# Patient Record
Sex: MALE | Race: BLACK OR AFRICAN AMERICAN | NOT HISPANIC OR LATINO | URBAN - METROPOLITAN AREA
[De-identification: names, ages, dates, MRNs, and addresses within clinical notes are randomized per-mention and may not be internally consistent; named-entity substitution may affect disease eponyms.]

---

## 2023-04-21 ENCOUNTER — TELEPHONE (OUTPATIENT)
Dept: SPORTS MEDICINE | Facility: CLINIC | Age: 23
End: 2023-04-21
Payer: COMMERCIAL

## 2023-04-21 ENCOUNTER — HOSPITAL ENCOUNTER (OUTPATIENT)
Dept: RADIOLOGY | Facility: HOSPITAL | Age: 23
Discharge: HOME | End: 2023-04-21
Attending: PHYSICIAN ASSISTANT
Payer: COMMERCIAL

## 2023-04-21 ENCOUNTER — HOSPITAL ENCOUNTER (OUTPATIENT)
Dept: RADIOLOGY | Facility: OTHER | Age: 23
Discharge: HOME | End: 2023-04-21
Attending: PHYSICIAN ASSISTANT
Payer: COMMERCIAL

## 2023-04-21 ENCOUNTER — OFFICE VISIT (OUTPATIENT)
Dept: SPORTS MEDICINE | Facility: CLINIC | Age: 23
End: 2023-04-21
Payer: COMMERCIAL

## 2023-04-21 VITALS
WEIGHT: 221 LBS | HEART RATE: 49 BPM | BODY MASS INDEX: 27.48 KG/M2 | HEIGHT: 75 IN | DIASTOLIC BLOOD PRESSURE: 73 MMHG | SYSTOLIC BLOOD PRESSURE: 133 MMHG

## 2023-04-21 DIAGNOSIS — M79.671 PAIN OF MIDFOOT, RIGHT: ICD-10-CM

## 2023-04-21 DIAGNOSIS — M79.671 RIGHT FOOT PAIN: ICD-10-CM

## 2023-04-21 DIAGNOSIS — M84.361A STRESS FRACTURE OF RIGHT TIBIA, INITIAL ENCOUNTER: ICD-10-CM

## 2023-04-21 DIAGNOSIS — M79.661 PAIN IN RIGHT SHIN: ICD-10-CM

## 2023-04-21 DIAGNOSIS — M79.661 PAIN IN RIGHT SHIN: Primary | ICD-10-CM

## 2023-04-21 PROCEDURE — 73630 X-RAY EXAM OF FOOT: CPT | Mod: TC,RT

## 2023-04-21 PROCEDURE — 73718 MRI LOWER EXTREMITY W/O DYE: CPT | Mod: TC,RT

## 2023-04-21 PROCEDURE — 73590 X-RAY EXAM OF LOWER LEG: CPT | Mod: 26,RT,, | Performed by: RADIOLOGY

## 2023-04-21 PROCEDURE — 99205 PR OFFICE/OUTPT VISIT, NEW, LEVL V, 60-74 MIN: ICD-10-PCS | Mod: S$GLB,,, | Performed by: PHYSICIAN ASSISTANT

## 2023-04-21 PROCEDURE — 73630 XR FOOT COMPLETE 3 VIEW RIGHT: ICD-10-PCS | Mod: 26,RT,, | Performed by: RADIOLOGY

## 2023-04-21 PROCEDURE — 73718 MRI TIBIA FIBULA WITHOUT CONTRAST RIGHT: ICD-10-PCS | Mod: 26,RT,, | Performed by: RADIOLOGY

## 2023-04-21 PROCEDURE — 73630 X-RAY EXAM OF FOOT: CPT | Mod: 26,RT,, | Performed by: RADIOLOGY

## 2023-04-21 PROCEDURE — 99999 PR PBB SHADOW E&M-NEW PATIENT-LVL III: ICD-10-PCS | Mod: PBBFAC,,, | Performed by: PHYSICIAN ASSISTANT

## 2023-04-21 PROCEDURE — 99999 PR PBB SHADOW E&M-NEW PATIENT-LVL III: CPT | Mod: PBBFAC,,, | Performed by: PHYSICIAN ASSISTANT

## 2023-04-21 PROCEDURE — 1160F RVW MEDS BY RX/DR IN RCRD: CPT | Mod: CPTII,S$GLB,, | Performed by: PHYSICIAN ASSISTANT

## 2023-04-21 PROCEDURE — 1159F PR MEDICATION LIST DOCUMENTED IN MEDICAL RECORD: ICD-10-PCS | Mod: CPTII,S$GLB,, | Performed by: PHYSICIAN ASSISTANT

## 2023-04-21 PROCEDURE — 3075F PR MOST RECENT SYSTOLIC BLOOD PRESS GE 130-139MM HG: ICD-10-PCS | Mod: CPTII,S$GLB,, | Performed by: PHYSICIAN ASSISTANT

## 2023-04-21 PROCEDURE — 73590 XR TIBIA FIBULA 2 VIEW RIGHT: ICD-10-PCS | Mod: 26,RT,, | Performed by: RADIOLOGY

## 2023-04-21 PROCEDURE — 3008F BODY MASS INDEX DOCD: CPT | Mod: CPTII,S$GLB,, | Performed by: PHYSICIAN ASSISTANT

## 2023-04-21 PROCEDURE — 99205 OFFICE O/P NEW HI 60 MIN: CPT | Mod: S$GLB,,, | Performed by: PHYSICIAN ASSISTANT

## 2023-04-21 PROCEDURE — 1159F MED LIST DOCD IN RCRD: CPT | Mod: CPTII,S$GLB,, | Performed by: PHYSICIAN ASSISTANT

## 2023-04-21 PROCEDURE — 1160F PR REVIEW ALL MEDS BY PRESCRIBER/CLIN PHARMACIST DOCUMENTED: ICD-10-PCS | Mod: CPTII,S$GLB,, | Performed by: PHYSICIAN ASSISTANT

## 2023-04-21 PROCEDURE — 3075F SYST BP GE 130 - 139MM HG: CPT | Mod: CPTII,S$GLB,, | Performed by: PHYSICIAN ASSISTANT

## 2023-04-21 PROCEDURE — 3078F PR MOST RECENT DIASTOLIC BLOOD PRESSURE < 80 MM HG: ICD-10-PCS | Mod: CPTII,S$GLB,, | Performed by: PHYSICIAN ASSISTANT

## 2023-04-21 PROCEDURE — 73718 MRI LOWER EXTREMITY W/O DYE: CPT | Mod: 26,RT,, | Performed by: RADIOLOGY

## 2023-04-21 PROCEDURE — 3008F PR BODY MASS INDEX (BMI) DOCUMENTED: ICD-10-PCS | Mod: CPTII,S$GLB,, | Performed by: PHYSICIAN ASSISTANT

## 2023-04-21 PROCEDURE — 73590 X-RAY EXAM OF LOWER LEG: CPT | Mod: TC,RT

## 2023-04-21 PROCEDURE — 3078F DIAST BP <80 MM HG: CPT | Mod: CPTII,S$GLB,, | Performed by: PHYSICIAN ASSISTANT

## 2023-04-21 NOTE — TELEPHONE ENCOUNTER
LVM Called patient to get info before appt this afternoon  -how long has he had injury  -is it sport related  -what school does he attend

## 2023-04-21 NOTE — TELEPHONE ENCOUNTER
Called and spoke to patient. He injured his right leg and top of right foot 1 year ago playing basketball. He attends [employer]. Notified patient to arrive 20 mins before appt for xray. Patient confirmed

## 2023-04-21 NOTE — PROGRESS NOTES
"CC: right shin and right foot pain    Josue Campos is a 22 y.o. Male who presents today for initial evaluation of right shin and right foot pain.  He is a  at [place] [place] [employer] [place], but has plans to transfer to a different school at the end of this academic semester. The [employer] [facility] [employer] Athletics training staff is unaware of the injury per patient.  Patient reports that he has been experiencing pain in his right shin for over a year now.  He reports a history of shin splints that have typically affected the medial aspect of his shin, however the pain that he has been experiencing recently seems quite different.  He localizes the pain to the right anterior shin and points to a noticeable area of swelling/tenderness about midway down his shin.  He reports that this is exquisitely tender to touch and has significant pain with running and jumping although he has continued to play through this injury.  He denies any direct trauma to the area that he can recall.  Regarding the foot, he has had on and off pain for the past 2-3 years.  He localizes the pain to the medial midfoot and is worse with running and jumping as well as standing on his toes.  He has not noticed any significant swelling in the area.  This also seems to have been slowing him down from an activity standpoint.  No prior injuries or surgeries on his right foot, ankle, or leg.        Is affecting ADLs.       PAST MEDICAL HISTORY: History reviewed. No pertinent past medical history.  PAST SURGICAL HISTORY: History reviewed. No pertinent surgical history.  FAMILY HISTORY: History reviewed. No pertinent family history.  SOCIAL HISTORY:   Social History     Socioeconomic History    Marital status: Single       MEDICATIONS: No current outpatient medications on file.  ALLERGIES: Review of patient's allergies indicates:  No Known Allergies    VITAL SIGNS: /73   Pulse (!) 49   Ht 6' 3" (1.905 m)   Wt 100.2 kg (221 lb)   BMI 27.62 kg/m²      Review " of Systems   Constitution: Negative. Negative for chills, fever and night sweats.   HENT: Negative for congestion and headaches.    Eyes: Negative for blurred vision, left vision loss and right vision loss.   Cardiovascular: Negative for chest pain and syncope.   Respiratory: Negative for cough and shortness of breath.    Endocrine: Negative for polydipsia, polyphagia and polyuria.   Hematologic/Lymphatic: Negative for bleeding problem. Does not bruise/bleed easily.   Skin: Negative for dry skin, itching and rash.   Musculoskeletal: Negative for falls and muscle weakness. Positive for right shin pain  Gastrointestinal: Negative for abdominal pain and bowel incontinence.   Genitourinary: Negative for bladder incontinence and nocturia.   Neurological: Negative for disturbances in coordination, loss of balance and seizures.   Psychiatric/Behavioral: Negative for depression. The patient does not have insomnia.    Allergic/Immunologic: Negative for hives and persistent infections.       PHYSICAL EXAMINATION    General:  The patient is alert and oriented x 3.  Mood is pleasant.  Observation of ears, eyes and nose reveal no gross abnormalities.  No labored breathing observed.    Right Foot and Ankle, Lower extremity exam    INSPECTION:      ALIGNMENT:  Gait:    Antalgic   Hindfoot  Normal    Scars:   None    Midfoot: Normal  Swelling:   mild     Forefoot: Normal  Color:   Normal      Atrophy:  None    Collective Ankle-Hindfoot Alignment    Heel / Toe Walking: + pain    Good -plantigrade (PG), well aligned             KNEE:  TENDERNESS / CREPITUS (T / C):          T / C      T / C   Patella   - / -   Lateral joint line   - / -   Peripatellar medial  -  Medial joint line    - / -   Peripatellar lateral -  Medial plica   - / -   Patellar tendon -   Popliteal fossa   - / -   Quad tendon   -   Gastrocnemius   -   Prepatellar Bursa - / -   Quadricep   -   Tibial tubercle  -  Thigh/hamstring  -   Pes  anserine/HS -  Fibula    -   ITB   - / -  Tibia     + (midshaft tibia)   Tib/fib joint  - / -  LCL    -     MFC   - / -   MCL: Proximal  -    LFC   - / -    Distal   -          ROM: (* = pain)  PASSIVE   ACTIVE    Left :   5 / 0 / 145   5 / 0 / 145     Right :    5 / 0 / 145   5 / 0 / 145       TENDERNESS:  lATERAL:    anterior:  Sinus tarsi:  None  Anteromedial joint line:  none  Syndesmosis:  none  Anterolateral joint line:  none  ATFL:   none  Talonavicular:    none   CFL:   none  Anterior tibialis:   none  Anterolateral gutter: none  Extensor tendons:   none  Fibula:   none  Peroneal tendons: none  POSTERIOR:  Peroneal tubercle.  None  Medial/lateral achilles:   none       Medial/lateral achilles insertion: none  MEDIAL:      Deltoid:  none  CALCANEUS:  Malleolus:  none  Retrocalcaneal:   none  PTT:   none  Medial achilles:   none  Navicular:  +  Lateral achilles:   none       Calcaneal tuberosity:   none  FOOT:    Calcaneal cuboid  none MT / MT heads:  +  Navicular   +  Medial cord origin PF:  none  Cuneiforms:   + Web space:   none  Lisfranc    none  Tarsal tunnel:   none  Base of the fifth metatarsal  none Tinels sign   Neg    KNEE: No tenderness to palpation regarding the right knee.  He exhibits full active and passive range of motion of the right knee.  Ligamentously stable.  Negative Marie's.        RANGE OF MOTION:  RIGHT/ LEFT   STRENGTH: (affected)  Ankle DF/PF:  15*/45* 15/45    Anterior tibialis: 4/5*     Eversion/Inversion: 15/25 15/25  Posterior tibialis: 5/5*   Midfoot ABD/ADD: 10/10 10/10  Gastroc-soleus: 5/5   First MTP DF/PF: 60/25 60/25  Peroneals:  5/5    (* = pain)    EHL:   5/5         FHL:   5/5      SPECIAL TESTS:   ANKLE INSTABILITY: (*pain)    Anterior drawer:   Normal      (C-W contralateral side)     Inversion:   30°     Eversion  10°            Collective Instability: (Ant-post and varus-valgus)     Stable        PROVOCATIVE TESTING:    Forced DF/ER: No pain at  syndesmosis.    Mid-leg squeeze  No pain at syndesmosis, + Pain at mid tibia    Forced DF:  No pain anterior joint line.      Forced PF:  No pain posterior ankle.     Forced INV:  No pain present laterally    Forced EV:  No pain medial     Valdezs sign: Normal ankle plantar flexion.     Resisted peroneal No subluxation or pain    1st-2nd MT toggle + pain at navicular/medial cuneiform    MT-T torque  + pain at navicular/medial cuneiform     NEUROLOGIC TESTING:  All dermatomes foot, ankle and leg have normal sensation light touch  Ankle Reflexes 2+, symmetric   Negative Babinski and No Clonus    VASCULAR:  2+ pulses PT/DT with brisk capillary refill toes.    Other Findings:  N/A    X-rays right tibia/fibula (4/21/2023):  There is a stress fracture present with cortical involvement along the anterior cortex with associated periosteal reaction/thickening.  The ankle mortise appears intact with no apparent widening.  Visualized portions of the knee also appear normal without any acute fractures.    X-rays right foot (4/21/2023):  No acute fracture or dislocation seen.  Satisfactory alignment.  No radiopaque foreign body or significant soft tissue edema seen.    ASSESSMENT:   Right lower leg/shin pain  Tibial stress fracture, right  Right foot pain, possible midfoot stress fracture    PLAN:    I made the decision to obtain old records of the patient including previous notes and imaging. New imaging was ordered today of the extremity or extremities evaluated. I independently reviewed and interpreted the radiographs and/or MRIs today as well as prior imaging, if available.    We discussed at length different conservative management options. These include anti-inflammatories, acetaminophen, rest, ice, heat, formal physical therapy including strengthening and stretching exercises, home exercise programs, dry needling, etc.     Orders placed for stat MRI of the right tibia to further evaluate tibial stress fracture.  I have  also placed orders for an MRI of the right mid foot to evaluate for possible midfoot stress fracture.      [id number] Steffany Valdez performed a custom orthotic / brace adjustment, fitting and training with the patient. The patient demonstrated understanding and proper care. This was performed for 10 minutes.  Fitted for a tall cam walking boot and provided with crutches.  Patient was instructed to be strictly nonweightbearing at this time.    Patient is not cleared for basketball and contact at this time.    Continue to rest, ice, and elevate the right leg and right foot and take over-the-counter anti-inflammatories/acetaminophen as needed for pain.    Follow-up with MRI results    All questions were answered. Instructed patient to call with questions or concerns in the interim.       Medical Dictation software was used during the dictation of portions or the entirety of this medical record.  Phonetic or grammatic errors may exist due to the use of this software. For clarification, refer to the author of the document.

## 2023-04-22 ENCOUNTER — HOSPITAL ENCOUNTER (OUTPATIENT)
Dept: RADIOLOGY | Facility: HOSPITAL | Age: 23
Discharge: HOME | End: 2023-04-22
Attending: PHYSICIAN ASSISTANT
Payer: COMMERCIAL

## 2023-04-22 DIAGNOSIS — M79.661 PAIN IN RIGHT SHIN: ICD-10-CM

## 2023-04-22 DIAGNOSIS — M79.671 RIGHT FOOT PAIN: ICD-10-CM

## 2023-04-22 DIAGNOSIS — M79.671 PAIN OF MIDFOOT, RIGHT: ICD-10-CM

## 2023-04-22 PROCEDURE — 73718 MRI LOWER EXTREMITY W/O DYE: CPT | Mod: TC,RT

## 2023-04-22 PROCEDURE — 73718 MRI FOOT (MIDFOOT) RIGHT WITHOUT CONTRAST: ICD-10-PCS | Mod: 26,RT,, | Performed by: RADIOLOGY

## 2023-04-22 PROCEDURE — 73718 MRI LOWER EXTREMITY W/O DYE: CPT | Mod: 26,RT,, | Performed by: RADIOLOGY

## 2023-04-24 ENCOUNTER — HOSPITAL ENCOUNTER (OUTPATIENT)
Dept: RADIOLOGY | Facility: HOSPITAL | Age: 23
Discharge: HOME | End: 2023-04-24
Attending: PHYSICIAN ASSISTANT
Payer: COMMERCIAL

## 2023-04-24 DIAGNOSIS — M84.361A STRESS FRACTURE OF RIGHT TIBIA, INITIAL ENCOUNTER: Primary | ICD-10-CM

## 2023-04-24 DIAGNOSIS — M84.361A STRESS FRACTURE OF RIGHT TIBIA, INITIAL ENCOUNTER: ICD-10-CM

## 2023-04-24 PROCEDURE — 73700 CT LOWER EXTREMITY W/O DYE: CPT | Mod: 26,RT,, | Performed by: RADIOLOGY

## 2023-04-24 PROCEDURE — 73700 CT LEG (TIBIA-FIBULA) WITHOUT CONTRAST RIGHT: ICD-10-PCS | Mod: 26,RT,, | Performed by: RADIOLOGY

## 2023-04-24 PROCEDURE — 73700 CT LOWER EXTREMITY W/O DYE: CPT | Mod: TC,RT

## 2023-04-25 ENCOUNTER — OFFICE VISIT (OUTPATIENT)
Dept: SPORTS MEDICINE | Facility: CLINIC | Age: 23
End: 2023-04-25
Payer: COMMERCIAL

## 2023-04-25 VITALS
SYSTOLIC BLOOD PRESSURE: 143 MMHG | HEIGHT: 75 IN | BODY MASS INDEX: 27.98 KG/M2 | HEART RATE: 62 BPM | WEIGHT: 225 LBS | DIASTOLIC BLOOD PRESSURE: 79 MMHG

## 2023-04-25 DIAGNOSIS — M84.361A STRESS FRACTURE OF RIGHT TIBIA, INITIAL ENCOUNTER: Primary | ICD-10-CM

## 2023-04-25 PROCEDURE — 1159F MED LIST DOCD IN RCRD: CPT | Mod: CPTII,S$GLB,, | Performed by: ORTHOPAEDIC SURGERY

## 2023-04-25 PROCEDURE — 99214 PR OFFICE/OUTPT VISIT, EST, LEVL IV, 30-39 MIN: ICD-10-PCS | Mod: S$GLB,,, | Performed by: ORTHOPAEDIC SURGERY

## 2023-04-25 PROCEDURE — 3008F BODY MASS INDEX DOCD: CPT | Mod: CPTII,S$GLB,, | Performed by: ORTHOPAEDIC SURGERY

## 2023-04-25 PROCEDURE — 99999 PR PBB SHADOW E&M-EST. PATIENT-LVL III: ICD-10-PCS | Mod: PBBFAC,,, | Performed by: ORTHOPAEDIC SURGERY

## 2023-04-25 PROCEDURE — 1159F PR MEDICATION LIST DOCUMENTED IN MEDICAL RECORD: ICD-10-PCS | Mod: CPTII,S$GLB,, | Performed by: ORTHOPAEDIC SURGERY

## 2023-04-25 PROCEDURE — 3008F PR BODY MASS INDEX (BMI) DOCUMENTED: ICD-10-PCS | Mod: CPTII,S$GLB,, | Performed by: ORTHOPAEDIC SURGERY

## 2023-04-25 PROCEDURE — 3078F PR MOST RECENT DIASTOLIC BLOOD PRESSURE < 80 MM HG: ICD-10-PCS | Mod: CPTII,S$GLB,, | Performed by: ORTHOPAEDIC SURGERY

## 2023-04-25 PROCEDURE — 3077F PR MOST RECENT SYSTOLIC BLOOD PRESSURE >= 140 MM HG: ICD-10-PCS | Mod: CPTII,S$GLB,, | Performed by: ORTHOPAEDIC SURGERY

## 2023-04-25 PROCEDURE — 99214 OFFICE O/P EST MOD 30 MIN: CPT | Mod: S$GLB,,, | Performed by: ORTHOPAEDIC SURGERY

## 2023-04-25 PROCEDURE — 3077F SYST BP >= 140 MM HG: CPT | Mod: CPTII,S$GLB,, | Performed by: ORTHOPAEDIC SURGERY

## 2023-04-25 PROCEDURE — 99999 PR PBB SHADOW E&M-EST. PATIENT-LVL III: CPT | Mod: PBBFAC,,, | Performed by: ORTHOPAEDIC SURGERY

## 2023-04-25 PROCEDURE — 3078F DIAST BP <80 MM HG: CPT | Mod: CPTII,S$GLB,, | Performed by: ORTHOPAEDIC SURGERY

## 2023-04-25 NOTE — PROGRESS NOTES
CC: Right shin and right foot pain     Josue is a 23 yo M college  who is c/o right anterior tibial pain. 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  He tells me that he is in the process of finishing up his classes at [employer] and graduating and has one year of college eligibility left.  He tells me he is looking at other schools and is not going to play for [employer] next year.      He states that he had some intermittent pain over his shin while playing basketball during the season but states that he was able to play with it.  He states that he was playing basketball last week and noticed that the anterior shin pain had gotten worse, though he was still able to play.  He also noted that he had foot pain on the same side. He came in to be evaluated last week and he was found to have an anterior tibial stress fracture on imaging at the end of last week on initial presentation to our clinic.  He was placed in a boot, held from basketball work-outs and returns to clinic following further imaging with his parents to discuss his diagnosis and his care options.      He has been nonweightbearing in a CAM walker boot with crutches since his visit with Omer Segovia 4 days ago.      Hx (4/21/23 - Omer Segovia PA-C):  Josue Campos is a 22 y.o. Male who presents today for initial evaluation of right shin and right foot pain.  He is a  at [employer], but has plans to transfer to a different school at the end of this academic semester. The [employer] training staff is unaware of the injury per patient.  Patient reports that he has been experiencing pain in his right shin for over a year now.  He reports a history of shin splints that have typically affected the medial aspect of his shin, however the pain that he has been experiencing recently seems quite  "different.  He localizes the pain to the right anterior shin and points to a noticeable area of swelling/tenderness about midway down his shin.  He reports that this is exquisitely tender to touch and has significant pain with running and jumping although he has continued to play through this injury.  He denies any direct trauma to the area that he can recall.  Regarding the foot, he has had on and off pain for the past 2-3 years.  He localizes the pain to the medial midfoot and is worse with running and jumping as well as standing on his toes.  He has not noticed any significant swelling in the area.  This also seems to have been slowing him down from an activity standpoint.  No prior injuries or surgeries on his right foot, ankle, or leg.        Is affecting ADLs.       PAST MEDICAL HISTORY: No past medical history on file.  PAST SURGICAL HISTORY: No past surgical history on file.  FAMILY HISTORY: No family history on file.  SOCIAL HISTORY:   Social History     Socioeconomic History    Marital status: Single       MEDICATIONS: No current outpatient medications on file.  ALLERGIES: Review of patient's allergies indicates:  No Known Allergies    VITAL SIGNS: BP (!) 143/79   Pulse 62   Ht 6' 3" (1.905 m)   Wt 102.1 kg (225 lb)   BMI 28.12 kg/m²      Review of Systems   Constitution: Negative. Negative for chills, fever and night sweats.   HENT: Negative for congestion and headaches.    Eyes: Negative for blurred vision, left vision loss and right vision loss.   Cardiovascular: Negative for chest pain and syncope.   Respiratory: Negative for cough and shortness of breath.    Endocrine: Negative for polydipsia, polyphagia and polyuria.   Hematologic/Lymphatic: Negative for bleeding problem. Does not bruise/bleed easily.   Skin: Negative for dry skin, itching and rash.   Musculoskeletal: Negative for falls and muscle weakness. Positive for right shin pain  Gastrointestinal: Negative for abdominal pain and bowel " incontinence.   Genitourinary: Negative for bladder incontinence and nocturia.   Neurological: Negative for disturbances in coordination, loss of balance and seizures.   Psychiatric/Behavioral: Negative for depression. The patient does not have insomnia.    Allergic/Immunologic: Negative for hives and persistent infections.       PHYSICAL EXAMINATION    General:  The patient is alert and oriented x 3.  Mood is pleasant.  Observation of ears, eyes and nose reveal no gross abnormalities.  No labored breathing observed.    Right Foot and Ankle, Lower extremity exam    INSPECTION:      ALIGNMENT:  Gait:    Antalgic   Hindfoot  Normal    Scars:   None    Midfoot: Normal  Swelling:   mild     Forefoot: Normal  Color:   Normal      Atrophy:  None    Collective Ankle-Hindfoot Alignment    Heel / Toe Walking: + pain    Good -plantigrade (PG), well aligned             KNEE:  TENDERNESS / CREPITUS (T / C):          T / C      T / C   Patella   - / -   Lateral joint line   - / -   Peripatellar medial  -  Medial joint line    - / -   Peripatellar lateral -  Medial plica   - / -   Patellar tendon -   Popliteal fossa   - / -   Quad tendon   -   Gastrocnemius   -   Prepatellar Bursa - / -   Quadricep   -   Tibial tubercle  -  Thigh/hamstring  -   Pes anserine/HS -  Fibula    -   ITB   - / -  Tibia     + (midshaft tibia)   Tib/fib joint  - / -  LCL    -     MFC   - / -   MCL: Proximal  -    LFC   - / -    Distal   -          ROM: (* = pain)  PASSIVE   ACTIVE    Left :   5 / 0 / 145   5 / 0 / 145     Right :    5 / 0 / 145   5 / 0 / 145       TENDERNESS:  lATERAL:    anterior:  Sinus tarsi:  None  Anteromedial joint line:  none  Syndesmosis:  none  Anterolateral joint line:  none  ATFL:   none  Talonavicular:    none   CFL:   none  Anterior tibialis:   none  Anterolateral gutter: none  Extensor tendons:   none  Fibula:   none  Peroneal tendons: none  POSTERIOR:  Peroneal tubercle.  None  Medial/lateral  achilles:   none       Medial/lateral achilles insertion: none  MEDIAL:      Deltoid:  none  CALCANEUS:  Malleolus:  none  Retrocalcaneal:   none  PTT:   none  Medial achilles:   none  Navicular:  +  Lateral achilles:   none       Calcaneal tuberosity:   none  FOOT:    Calcaneal cuboid  none MT / MT heads:  +  Navicular   +  Medial cord origin PF:  none  Cuneiforms:   + Web space:   none  Lisfranc    none  Tarsal tunnel:   none  Base of the fifth metatarsal  none Tinels sign   Neg    KNEE: No tenderness to palpation regarding the right knee.  He exhibits full active and passive range of motion of the right knee.  Ligamentously stable.  Negative Marie's.        RANGE OF MOTION:  RIGHT/ LEFT   STRENGTH: (affected)  Ankle DF/PF:  15*/45* 15/45    Anterior tibialis: 4/5*     Eversion/Inversion: 15/25 15/25  Posterior tibialis: 5/5*   Midfoot ABD/ADD: 10/10 10/10  Gastroc-soleus: 5/5   First MTP DF/PF: 60/25 60/25  Peroneals:  5/5    (* = pain)    EHL:   5/5         FHL:   5/5      SPECIAL TESTS:   ANKLE INSTABILITY: (*pain)    Anterior drawer:   Normal      (C-W contralateral side)     Inversion:   30°     Eversion  10°            Collective Instability: (Ant-post and varus-valgus)     Stable        PROVOCATIVE TESTING:    Forced DF/ER: No pain at syndesmosis.    Mid-leg squeeze  No pain at syndesmosis, + Pain at mid tibia    Forced DF:  No pain anterior joint line.      Forced PF:  No pain posterior ankle.     Forced INV:  No pain present laterally    Forced EV:  No pain medial     Valdezs sign: Normal ankle plantar flexion.     Resisted peroneal No subluxation or pain    1st-2nd MT toggle + pain at navicular/medial cuneiform    MT-T torque  + pain at navicular/medial cuneiform     NEUROLOGIC TESTING:  All dermatomes foot, ankle and leg have normal sensation light touch  Ankle Reflexes 2+, symmetric   Negative Babinski and No Clonus    VASCULAR:  2+ pulses PT/DT with brisk capillary refill toes.    Other  Findings:  N/A    X-rays right tibia/fibula (4/21/2023):  There is a stress fracture present with cortical involvement along the anterior cortex with associated periosteal reaction/thickening.  The ankle mortise appears intact with no apparent widening.  Visualized portions of the knee also appear normal without any acute fractures.    X-rays right foot (4/21/2023):  No acute fracture or dislocation seen.  Satisfactory alignment.  No radiopaque foreign body or significant soft tissue edema seen.      CT Right tibia/fibula:   Narrative & Impression  EXAMINATION:  CT LEG (TIBIA-FIBULA) WITHOUT CONTRAST RIGHT     CLINICAL HISTORY:  Fracture, tib/fib;  Stress fracture, right tibia, initial encounter for fracture     TECHNIQUE:  CT right tibia/fibula performed without contrast.  Coronal and sagittal reformats provided.     COMPARISON:  MRI 04/21/2023     FINDINGS:  There is focal transverse lucency within the anterior tibial cortex corresponding to signal abnormality on prior MRI and compatible with stress fracture.  No additional fracture.  No lytic or blastic lesion.     Soft tissues are unremarkable.  Achilles tendon is intact.  No knee or ankle effusion.     Impression:     Anterior tibial stress fracture.    Mri right/tibia -   EXAMINATION:  MRI TIBIA FIBULA WITHOUT CONTRAST RIGHT     CLINICAL HISTORY:  Fracture, tib/fib;tibial stress fracture with cortical disruption seen on x-ray;  Stress fracture, right tibia, initial encounter for fracture     TECHNIQUE:  Routine MRI evaluation of the right tibia-fibula performed without contrast.     COMPARISON:  Radiograph 04/21/2023.     FINDINGS:  BONES: Cortical thickening at the anterior mid tibia with an intracortical area of edema and surrounding periosteal edema.  No medullary marrow signal abnormality.  No avascular necrosis.  No marrow infiltrative process.     MUSCLES: Normal bulk and signal intensity.     MISCELLANEOUS: Visualized neurovascular structures appear  normal.     Impression:     Anterior tibial cortex stress fracture with associated intracortical and periosteal edema.  No medullary signal abnormality.    ASSESSMENT:   Right lower leg/shin pain  Anterior tibial stress fracture, right  Right foot pain, possible midfoot stress fracture    PLAN:  Treatment options were discussed with the patient about his anterior tibia.  I reviewed the Xrays/MRI/CT images with his, including the relevant anatomy, and what this means for his knee.  We discussed treatment options with him and his parents, along with a , who was present during the visit.  He mentioned to me that he was interested in finishing up his college year and could be interested in having care set up in [place] for his leg and foot.    We discussed that he had a stress fracture in the anterior cortex of his tibia, which was present on all imaging (xrays, CT and MRI).  We discussed that while he could continue to play with that that there were risks of him continuing to play.  The biggest risks that we discussed included worsening of the stress fracture, completion of the fracture of his tibia or re-aggrevation of his stress fracture later when he is in season.  We discussed that getting these to heal with conservative treatment can be challenging.  We discussed that the most reliable way to treat these stress fractures involves surgery with a reamed intramedullary nail in his tibia to protect the bone.  He and his family seem to understand this.      Josue would like to finish up his semester which would seem most reasonable.  I recommended keeping him out of basketball for now and will start him on calcium, vitamin D and discussed pros and cons of a bone stimulator and use of forteo.  They would like to discuss this with an orthopaedic surgeon in the [place] area as that is where they are from, and I provided them with the contact information for [place]. Kenneth Ragland at [facility] [place]  I offered to  reach out to Dr. Ragland's office to make the connection.    We will see Josue back as needed if he needs anything else while he is in [place] of if he decided to continue his care here.      Dirk Hunt MD         All questions were answered. Instructed patient to call with questions or concerns in the interim.

## 2023-08-22 ENCOUNTER — ATHLETIC TRAINING SESSION (OUTPATIENT)
Dept: SPORTS MEDICINE | Facility: CLINIC | Age: 23
End: 2023-08-22
Payer: COMMERCIAL

## 2023-08-22 DIAGNOSIS — M79.10 MUSCLE SORENESS: Primary | ICD-10-CM

## 2023-08-22 NOTE — PROGRESS NOTES
Subjective:       Chief Complaint: Josue Campos is a 23 y.o. male student at [employer] who had concerns including Shoulder Pain (Bilateral) and Elbow Pain (Bilateral).    Pt texted me at 4am in the morning on 4/22/2023 stating he was suhail full sweat and unable to lift his arms pt stated he was unable to lift his arms and cant lift them past 90 degrees. Pt stated he tried ice, heat, and advil stating nothing helped.Pt stated no numbness of tingling is present. PMH: pt stated he has not had any injuries to his shoulder or elbows    Handedness: right-handed  Sport played: basketball      Level: college            Shoulder Pain   The pain is present in the right shoulder and left shoulder. This is a new problem. The current episode started today. There has been no history of extremity trauma. The injury was the result of a lifting (lifting weights) action while weight lifting. The problem occurs constantly. The quality of the pain is described as aching. The pain is at a severity of 7/10. He has tried NSAIDs, cold and heat for the symptoms. The treatment provided no relief.   Elbow Pain   The pain is present in the left elbow and right elbow. This is a new problem. The current episode started yesterday. The injury was the result of a lifting action while weight lifting. The quality of the pain is described as aching. The pain is at a severity of 7/10. He has tried NSAIDs, cold and heat for the symptoms. The treatment provided no relief.       ROS      Objective:       General: Josue is well-developed, well-nourished, appears stated age, in no acute distress, alert and oriented to time, place and person.                 Right Hand/Wrist Exam     Range of Motion   The patient has normal right hand/wrist ROM.      Left Hand/Wrist Exam     Range of Motion   The patient has normal left hand/wrist ROM.      Right Elbow Exam     Inspection   Bruising: absent  Deformity: absent    Range of Motion   Extension:  normal   Flexion:   abnormal Right elbow flexion: Decreased due to pain.    Comments:  Tenderness on triceps      Left Elbow Exam     Inspection   Bruising: absent  Deformity: absent    Range of Motion   Extension:  normal   Left elbow flexion: Decreased due to pain.     Comments:  Tenderness on triceps      Right Shoulder Exam     Inspection/Observation   Swelling: absent  Bruising: absent  Deformity: absent    Range of Motion   Extension:  abnormal Right shoulder extension: decreased due to pain.  Forward Flexion:  abnormal Right shoulder forward flexion: decreased due to pain.    Left Shoulder Exam     Inspection/Observation   Swelling: absent  Bruising: absent  Deformity: absent    Range of Motion   Extension:  abnormal Left shoulder extension: decreased due to pain.  Forward Flexion:  abnormal Left shoulder active forward flexion: decreased due to pain.       Muscle Strength   Right Upper Extremity   Biceps: 4/5 (Due to pain)   Triceps:  4/5 (Due to pain)  Elbow Extension: 4/5  Elbow Flexion: 3/5  Left Upper Extremity  Biceps: 4/5 (Due to pain)   Triceps:  4/5 (Due to pain)  Elbow Extension: 4/5  Elbow Flexion: 3/5    Vascular Exam     Right Pulses      Right radial pulse: normal.      Left Pulses      Left radial pulse: normal.      Capillary Refill  Right Hand: normal capillary refill  Left Hand: normal capillary refill                Assessment:     Status: AT - Cleared to Exert    Date Out: N/A    Date Cleared: N/A      Plan:       1. Pt will start stretching program and be treated with modalities to decrease soreness  2. Physician Referral: no  3. ED Referral: no  4. Parent/Guardian Notified: No  5. All questions were answered, ath. will contact me for questions or concerns in  the interim.  6.         Eligible to use School Insurance: Yes

## 2023-08-23 ENCOUNTER — ATHLETIC TRAINING SESSION (OUTPATIENT)
Dept: SPORTS MEDICINE | Facility: CLINIC | Age: 23
End: 2023-08-23
Payer: COMMERCIAL

## 2023-08-23 DIAGNOSIS — M79.10 MUSCLE SORENESS: Primary | ICD-10-CM

## 2023-08-23 NOTE — PROGRESS NOTES
Subjective:       Chief Complaint: Josue Campos is a 23 y.o. male student at [employer] who had concerns including shoulder pain/ elbow pain  (Soreness).    Handedness: right-handed  Sport played: basketball      Level: college                  Objective:       General: Josue is well-developed, well-nourished, appears stated age, in no acute distress, alert and oriented to time, place and person.           Assessment:     Status: L - Limited    Date Out: n/a    Date Cleared: TBD      Plan:       [facility]       Athletic Training Phone/Text Conversation     Name: Josue Campos  Clinic Number: [id number]  Sport: Men's Basketball  8/24/2023    Notes     Student-athlete contacted ATC via TEXT    Summary of conversation:  8/23/2023  Pt stated he was concerned of rhabdomyolysis and is not doing better and would like to see a Doctor.     Pt was scheduled to see Dr. Jaxson Gtz on 8/24/2023      YO Bailey, ATC    [facility] [employer] [facility]     ---------------------------------------------------------------------------------------  ---------------------------------------------------------------------------------------    [facility]  Date:8/23/2023  Initial encounter in epic  Subjective: Pt stated he is doing a little better and lifting weights 50-70 percent was beneficial.     Objective: no ecchymosis, deformity, swelling.     Plan: Pt instructed to to 50-70 percent of his max during weights with the team.      Josue completed therapeutic exercises to develop flexibility       Exercise Reps/Sets/Time Weight #   Elbow flexion 3x30s    Elbow extension 3x30s    Wall walks 1x10    Wall slides 1x10    Shoulder flexion slides 1x10    Triceps stretch [id number]    Small roller (roll out triceps) 1min                                       Josue received the following  modalities and/or manual therapy techniques:      Modality/Manual Therapy Time   IASTM triceps 3mins                                SHAZIA iVveros, LAT, HENOK    [facility]     [email or web address]   [email or web address]          ---------------------------------------------------------------------------------------  ---------------------------------------------------------------------------------------    [facility]  Date:8/22/2023  Initial encounter in epic  Subjective: Pt stated he is very sore unable to fully flex his shoulder or elbows.    Objective: no ecchymosis, deformity, swelling.     Plan: Pt instructed to to 50-70 percent of his max during weights with the team.      Josue completed therapeutic exercises to develop flexibility       Exercise Reps/Sets/Time Weight #   Elbow flexion 3x30s    Elbow extension 3x30s    Wall walks 1x10    Wall slides 1x10    Shoulder flexion slides 1x10    Triceps stretch [id number]    Small roller (roll out triceps) 1min                                      Josue received the following  modalities and/or manual therapy techniques:      Modality/Manual Therapy Time   Cold/hot whirlpool  10 mins in each   Arm normatec 15mins                            SHAZIA Viveros, LAT, HENOK    [facility]     [email or web address]   [email or web address]    ---------------------------------------------------------------------------------------  ---------------------------------------------------------------------------------------

## 2023-08-24 ENCOUNTER — OFFICE VISIT (OUTPATIENT)
Dept: SPORTS MEDICINE | Facility: CLINIC | Age: 23
End: 2023-08-24
Payer: COMMERCIAL

## 2023-08-24 ENCOUNTER — HOSPITAL ENCOUNTER (OUTPATIENT)
Dept: RADIOLOGY | Facility: HOSPITAL | Age: 23
Discharge: HOME | End: 2023-08-24
Attending: ORTHOPAEDIC SURGERY
Payer: COMMERCIAL

## 2023-08-24 VITALS
BODY MASS INDEX: 27.98 KG/M2 | SYSTOLIC BLOOD PRESSURE: 120 MMHG | HEIGHT: 75 IN | DIASTOLIC BLOOD PRESSURE: 80 MMHG | WEIGHT: 225 LBS

## 2023-08-24 DIAGNOSIS — M79.89 LEFT UPPER EXTREMITY SWELLING: ICD-10-CM

## 2023-08-24 DIAGNOSIS — M25.522 BILATERAL ELBOW JOINT PAIN: Primary | ICD-10-CM

## 2023-08-24 DIAGNOSIS — R25.2 MUSCLE CRAMPING: Primary | ICD-10-CM

## 2023-08-24 DIAGNOSIS — R25.2 MUSCLE CRAMPING: ICD-10-CM

## 2023-08-24 DIAGNOSIS — M79.18 MYALGIA, UPPER ARM: ICD-10-CM

## 2023-08-24 DIAGNOSIS — M79.601 BILATERAL ARM PAIN: Primary | ICD-10-CM

## 2023-08-24 DIAGNOSIS — M79.602 BILATERAL ARM PAIN: Primary | ICD-10-CM

## 2023-08-24 DIAGNOSIS — M25.521 BILATERAL ELBOW JOINT PAIN: Primary | ICD-10-CM

## 2023-08-24 PROCEDURE — 99999 PR PBB SHADOW E&M-EST. PATIENT-LVL II: CPT | Mod: PBBFAC,,, | Performed by: ORTHOPAEDIC SURGERY

## 2023-08-24 PROCEDURE — 93971 EXTREMITY STUDY: CPT | Mod: 26,RT,, | Performed by: RADIOLOGY

## 2023-08-24 PROCEDURE — 3074F SYST BP LT 130 MM HG: CPT | Mod: CPTII,S$GLB,, | Performed by: ORTHOPAEDIC SURGERY

## 2023-08-24 PROCEDURE — 1159F MED LIST DOCD IN RCRD: CPT | Mod: CPTII,S$GLB,, | Performed by: ORTHOPAEDIC SURGERY

## 2023-08-24 PROCEDURE — 1160F RVW MEDS BY RX/DR IN RCRD: CPT | Mod: CPTII,S$GLB,, | Performed by: ORTHOPAEDIC SURGERY

## 2023-08-24 PROCEDURE — 93971 EXTREMITY STUDY: CPT | Mod: TC,LT

## 2023-08-24 PROCEDURE — 3008F BODY MASS INDEX DOCD: CPT | Mod: CPTII,S$GLB,, | Performed by: ORTHOPAEDIC SURGERY

## 2023-08-24 PROCEDURE — 1159F PR MEDICATION LIST DOCUMENTED IN MEDICAL RECORD: ICD-10-PCS | Mod: CPTII,S$GLB,, | Performed by: ORTHOPAEDIC SURGERY

## 2023-08-24 PROCEDURE — 3074F PR MOST RECENT SYSTOLIC BLOOD PRESSURE < 130 MM HG: ICD-10-PCS | Mod: CPTII,S$GLB,, | Performed by: ORTHOPAEDIC SURGERY

## 2023-08-24 PROCEDURE — 3008F PR BODY MASS INDEX (BMI) DOCUMENTED: ICD-10-PCS | Mod: CPTII,S$GLB,, | Performed by: ORTHOPAEDIC SURGERY

## 2023-08-24 PROCEDURE — 99214 PR OFFICE/OUTPT VISIT, EST, LEVL IV, 30-39 MIN: ICD-10-PCS | Mod: S$GLB,,, | Performed by: ORTHOPAEDIC SURGERY

## 2023-08-24 PROCEDURE — 93971 US UPPER EXTREMITY VEINS RIGHT: ICD-10-PCS | Mod: 26,RT,, | Performed by: RADIOLOGY

## 2023-08-24 PROCEDURE — 93971 EXTREMITY STUDY: CPT | Mod: TC,RT

## 2023-08-24 PROCEDURE — 1160F PR REVIEW ALL MEDS BY PRESCRIBER/CLIN PHARMACIST DOCUMENTED: ICD-10-PCS | Mod: CPTII,S$GLB,, | Performed by: ORTHOPAEDIC SURGERY

## 2023-08-24 PROCEDURE — 93971 US UPPER EXTREMITY VEINS LEFT: ICD-10-PCS | Mod: 26,LT,, | Performed by: RADIOLOGY

## 2023-08-24 PROCEDURE — 99999 PR PBB SHADOW E&M-EST. PATIENT-LVL II: ICD-10-PCS | Mod: PBBFAC,,, | Performed by: ORTHOPAEDIC SURGERY

## 2023-08-24 PROCEDURE — 3079F PR MOST RECENT DIASTOLIC BLOOD PRESSURE 80-89 MM HG: ICD-10-PCS | Mod: CPTII,S$GLB,, | Performed by: ORTHOPAEDIC SURGERY

## 2023-08-24 PROCEDURE — 3079F DIAST BP 80-89 MM HG: CPT | Mod: CPTII,S$GLB,, | Performed by: ORTHOPAEDIC SURGERY

## 2023-08-24 PROCEDURE — 93971 EXTREMITY STUDY: CPT | Mod: 26,LT,, | Performed by: RADIOLOGY

## 2023-08-24 PROCEDURE — 99214 OFFICE O/P EST MOD 30 MIN: CPT | Mod: S$GLB,,, | Performed by: ORTHOPAEDIC SURGERY

## 2023-08-24 NOTE — PROGRESS NOTES
"CC: bilateral elbow/upper arm pain    23 y.o. Male presents today for evaluation of his bilateral elbow/upper arm pain. 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 He states he did a very upper body intensive workout 08/21/2023 and felt more fatigued than normal. He states he tried to shoot after the workout but was unable to due to his arms feeling "dead." He states he had not previously been working out this hard. He states his urine has been clear and is has been adequately hydrating. He denies numbness/tingling to the hands or fingers. He has noted persistent left posterior upper arm swelling that hurts to squeeze and to bend his elbow.  How long: Beginning 08/22/2023  What makes it better: Nothing  What makes it worse: Elbow flexion, palpation of the affected area  Does it radiate: Denies  Attempted treatments: Stretching, rest, Tylenol and Advil as needed  Pain score: 2/10  Any mechanical symptoms: Denies  Feelings of instability: Denies  Problems with ADLs: Admits to this problem affecting his ability to perform ADLs    Occupation: Student athlete - [employer]    PAST MEDICAL HISTORY:   History reviewed. No pertinent past medical history.    PAST SURGICAL HISTORY:   History reviewed. No pertinent surgical history.    FAMILY HISTORY:   History reviewed. No pertinent family history.    SOCIAL HISTORY:   Social History     Socioeconomic History    Marital status: Single       MEDICATIONS:   No current outpatient medications on file.    ALLERGIES:   Review of patient's allergies indicates:  No Known Allergies     PHYSICAL EXAMINATION:  /80   Ht 6' 3" (1.905 m)   Wt 102.1 kg (225 lb)   BMI 28.12 kg/m²   Vitals signs and nursing note have been reviewed.  General: In no acute distress, well developed, well nourished, no diaphoresis  Eyes: EOM full and smooth, no eye " redness or discharge  HENT: normocephalic and atraumatic, neck supple, trachea midline, no nasal discharge, no external ear redness or discharge  Cardiovascular: no LE edema  Lungs: respirations non-labored, no conversational dyspnea   Abd: non-distended, no rigidity  MSK: no amputation or deformity, no swelling of extremities  Neuro: AAOx3, CN2-12 grossly intact  Skin: No rashes, warm and dry  Psychiatric: cooperative, pleasant, mood and affect appropriate for age    Elbow: BILATERAL   The affected elbow is compared to the contralateral elbow.    Observation:    There is no erythema, or ecchymosis. Edema of the left upper arm over the proximal left triceps  There is no obvious muscle atrophy, hypertonicity, or hypotonicity of arm muscles.  There is no abnormal carrying angle or gunstock deformity noted.    ROM:  Active flexion to 70° on left and 80° on right.  Decreased due to pain.  Active extension to 0° on left and 0° on right without hyperextension.   Active pronation to 80° on left and 80° on right.    Active supination to 80° on left and 80° on right.    Active radial deviation to 20° on left and 20° on right.    Active ulnar deviation to 30° on left and 30° on right.    Tenderness To Palpation:  No tenderness at the medial epicondyle or lateral epicondyle.  No tenderness at the olecranon.  No tenderness at the distal humerus or proximal radius/ulna.  No tenderness at the radial head.  No tenderness over the ulnar and radial collateral ligaments.  No tenderness over the posterior interosseous nerve or distal biceps tendon.  + tenderness over the proximal left triceps muscle    Strength Testing:  Deltoid - 5/5 on left and 5/5 on right  Biceps - 5/5 on left and 5/5 on right  Triceps - 5/5 on left and 5/5 on right  Wrist extension - 5/5 on left and 5/5 on right  Wrist flexion - 5/5 on left and 5/5 on right   - 5/5 on left and 5/5 on right  Finger extension - 5/5 on left and 5/5 on right  Finger abduction -  5/5 on left and 5/5 on right    Special Tests:  No laxity of the MCL at 0 and 30 degrees.    Milking maneuver - negative  No laxity of the LCL at 0 and 30 degrees.  No laxity with posterolateral and posteromedial rotary testing.    Resisted supination - negative  Resisted pronation - negative  Resisted wrist extension - negative  Resisted wrist flexion - negative    Neurovascular Exam:  2+ radial pulses BL.  Sensation to light touch intact in the distal median, radial, and ulnar nerve distributions bilaterally.  Negative Tinnels at carpal tunnel.  Negative Tinnels at cubital tunnel.      IMAGIN. X-ray ordered due to bilateral elbow pain   2. X-ray images were reviewed personally by me and then directly with patient.  3. FINDINGS: X-ray images obtained demonstrate no fracture or dislocation, joint spaces maintained  4. IMPRESSION: As above.       ASSESSMENT:      ICD-10-CM ICD-9-CM   1. Bilateral arm pain  M79.601 729.5    M79.602    2. Left upper extremity swelling  M79.89 729.81   3. Muscle cramping  R25.2 729.82   4. Myalgia, upper arm  M79.18 729.1         PLAN:  1-4. 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  Since that time he has had persistent pain, loss of motion especially on the left, and swelling of the left upper arm.  Denies any prior history blood clot or family history of clotting disorder.    - XRs ordered in the office today and images were personally reviewed with the patient. See above for further detail.    - Symptoms, exam, history are concerning for either or both exertional thrombosis, rhabdomyolysis.  Further workup is necessary.    - Due to concern for exertional thrombosis, an ultrasound of the bilateral upper extremity has been ordered and  needs to be obtained quickly.     - BMP, CK, urinalysis ordered for evaluation for possible recovering rhabdomyolysis.    - Contact has been made with his ATC who is on board with the plan.  Hold from sport until labs and testing results return.      Future planning includes - next steps pending labs/imaging    All questions were answered to the best of my ability and all concerns were addressed at this time.    Follow up pending results of labs/imaging.      This note is dictated using the M*Modal Fluency Direct word recognition program. There are word recognition mistakes that are occasionally missed on review.      Total time spent face-to face with patient counseling or coordinating care including prognosis, differential diagnosis, risks and benefits of treatment, instructions, compliance risk reductions as well as non-face-to-face time personally spent reviewing medial record, medical documentation, and coordination of care.     EST MINUTES X   21820 10-19    [id number] 20-29    [id number] 30-39    [id number] 40-54    NEW     14846 15-29    17257 30-44    [id number] 45-59 X   [id number] 60-74    PHONE      5-10    [id number] 11-20    [id number] 21-30

## 2023-08-24 NOTE — PROGRESS NOTES
Subjective:     Josue Campos     No chief complaint on file.    HPI    Josue is a 23 y.o. male coming in today for right shin pain that began *** {DAY/WEEK/MONTH/YEAR:06420} ago, referred by Noemi WHITING. Pt. describes the pain as a {Numbers; 1-10:92285}/10 {Desc; Pain:21469} pain that {does/does not:800918} radiate. There {WAS WAS NOT:27626} a fall/injury/ or trauma associated with the onset of symptoms. The pain is better with *** and worse with ***. Pt. Denies any other musculoskeletal complaints at this time.     Joint instability? no  Mechanical locking/clicking? no  Affecting ADL's? no  Affecting sleep? 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  PAST SURGICAL HISTORY: No past surgical history on file.  FAMILY HISTORY: No family history on file.  SOCIAL HISTORY:   Social History     Socioeconomic History    Marital status: Single     MEDICATIONS: No current outpatient medications on file.  ALLERGIES:   Review of patient's allergies indicates:  No Known Allergies    Reviewed office visit on 23 with Dr. Dirk Hunt : Josue would like to finish up his semester which would seem most reasonable.  I recommended keeping him out of basketball for now and will start him on calcium, vitamin D and discussed pros and cons of a bone stimulator and use of forteo.  They would like to discuss this with an orthopaedic surgeon in the [place] area as that is where they are from, and I [place] them with [place] [facility] [place]  I offered to reach out to Dr. Ragland's office to make the connection.    IMAGIN. CT ordered due to right shin pain taken 23.   2. CT images were reviewed personally by me and then directly with patient.  3. FINDINGS: There is focal transverse lucency within the anterior tibial cortex corresponding to signal abnormality on prior MRI and compatible with stress fracture.  No  additional fracture.  No lytic or blastic lesion. Soft tissues are unremarkable.  Achilles tendon is intact.  No knee or ankle effusion.  4. IMPRESSION: Anterior tibial stress fracture.    1. MRI ordered due to right foot pain taken 4/23/23.   2. MRI images were reviewed personally by me and then directly with patient.  3. FINDINGS:   LIGAMENTS: Lisfranc, dorsal talonavicular, bifurcate and dorsal calcaneocuboid ligaments are intact.  TENDONS: Visualized flexor and extensor tendons are normal.  BONES: No fracture.  No avascular necrosis.  No marrow infiltrative process.  JOINTS/CARTILAGE: Visualized chondral surfaces are preserved.  No high-grade partial or full-thickness chondral defects.  No osteochondral lesions.  No subchondral edema.  MUSCLES: Normal bulk and signal intensity.  MISCELLANEOUS: Hallux plantar plate complex and lesser MTP plantar plates appear within normal limits.  Visualized plantar aponeurosis is normal.  Sinus tarsi demonstrates preserved signal intensity.  Visualized subcutaneous soft tissues are within normal limits.  4. IMPRESSION: No MR imaging findings to account for reported history of foot pain.    1. MRI ordered due to right shin pain taken 4/22/23.   2. MRI images were reviewed personally by me and then directly with patient.  3. FINDINGS: BONES: Cortical thickening at the anterior mid tibia with an intracortical area of edema and surrounding periosteal edema.  No medullary marrow signal abnormality.  No avascular necrosis.  No marrow infiltrative process. MUSCLES: Normal bulk and signal intensity. MISCELLANEOUS: Visualized neurovascular structures appear   4. IMPRESSION: Anterior tibial cortex stress fracture with associated intracortical and periosteal edema.  No medullary signal abnormality.    1. X-ray ordered due to right shin pain. (AP and lateral views) taken 4/22/23.   2. X-ray images were reviewed personally by me and then directly with patient.  3. FINDINGS: Focal cortical  thickening with small associated lucency involving the anterior cortex of the mid tibial diaphysis, imaging findings typical of a stress fracture. Fibula is intact.Soft tissues are unremarkable.  4. IMPRESSION: Abnormal study as above.    1. X-ray ordered due to right foot pain. (AP, lateral, and oblique views) taken 4/22/23.   2. X-ray images were reviewed personally by me and then directly with patient.  3. FINDINGS: No acute fracture or dislocation seen.  No soft tissue edema or radiopaque retained foreign body.  4. IMPRESSION: No acute osseous abnormality seen    Objective:     VITAL SIGNS: There were no vitals taken for this visit.   General    Vitals reviewed.  Constitutional: He is oriented to person, place, and time. He appears well-developed and well-nourished.   Neurological: He is alert and oriented to person, place, and time.   Psychiatric: He has a normal mood and affect. His behavior is normal.           MUSCULOSKELETAL EXAM  ANKLE: right ANKLE  The affected ankle is compared to the contralateral ankle.    Observation:    There is no edema, erythema, or ecchymosis.   Shoes reveal a normal wear pattern.  No structural deformities including pes planus/cavus, hallux valgus, or toe deformities.  Negative too-many toes sign.    Normal callus pattern on the feet bilaterally.    Achilles tendon and calcaneal insertion reveals no deformities  No leg or intrinsic foot muscle atrophy.  Squatting reveals symmetric pronation of the bilateral feet.   Able to rise on toes with symmetric supination.    Normal gait without evidence of antalgia.    ROM (* = with pain):  Active dorsiflexion to 20° on left and 20° on right  Active plantarflexion to 50° on left and 50° on right    Active ankle inversion to 35° on left and 35° on right  Active ankle eversion to 15° on left and 15° on right  Full active flexion/extension of the toes bilaterally.   Heel cords without tightness bilaterally.    Tenderness To Palpation:  No  "tenderness at the ATFL, CFL, PTFL, or deltoid ligaments  No tenderness over the distal anterior syndesmosis, distal tibia/fibula, fibular head/shaft  No tenderness at medial or lateral malleoli   No tenderness at navicular, cuboid, cuneiforms, talus, or calcaneous  No tenderness along the metatarsals or phalanges  No tenderness at the Achilles tendon calcaneal insertion  No tenderness at the posterior tibial or peroneal tendons    Strength Testing (* = with pain):  Dorsiflexion - 5/5 on left and 5/5 on right  Platarflexion - 5/5 on left and 5/5 on right  Resisted Inversion - 5/5 on left and 5/5 on right  Resisted Eversion - 5/5 on left and 5/5 on right  Great Toe Extension - 5/5 on left and 5/5 on right  Great Toe Flexion - 5/5 on left and 5/5 on right    Special Tests:  Anterior talar drawer - negative and without dimpling  Talar tilt - negative  Reverse Talar tilt - negative    Heel tap test - negative  Distal tib/fib squeeze test - negative  External rotation stress (Kleiger) test - negative  Valdez squeeze test - negative    Metatarsal squeeze test - negative  Midfoot stress test - negative  Calcaneal squeeze test - negative    Tuning fork exam over the *** does not produce pain or discomfort    No subluxation of the peroneal tendons with resisted eversion.    Vascular/Sensory Exam:  DP and PT pulses intact bilaterally  No skin changes, no abnormal hair distribution  Sensation intact to light touch throughout the saphenous, sural, superficial peroneal, deep peroneal, and tibial nerve distributions  Negative Tinel's test over tarsal tunnel  2+/4 reflexes at L4 and S1 dermatomes  Capillary refill intact <2 seconds in all toes bilaterally.    TART (Tissue texture abnormality, Asymmetry,  Restriction of motion and/or Tenderness) changes:    Head: Occipitoatlantal (OA) Joint {OMTOA:22047::"Neutral"}     Cervical Spine   C1 {OMTcervical:57801::"Neutral"}   C2 {OMTcervical:00532::"Neutral"}   C3 " "{OMTcervical:39360::"Neutral"}   C4 {OMTcervical:66781::"Neutral"}   C5 {OMTcervical:48247::"Neutral"}   C6 {OMTcervical:28178::"Neutral"}   C7 {OMTcervical:77460::"Neutral"}      Thoracic Spine   T1 {OMTT/L:20479::"Neutral"}   T2 {OMTT/L:52706::"Neutral"}   T3 {OMTT/L:30392::"Neutral"}   T4 {OMTT/L:27675::"Neutral"}   T5 {OMTT/L:88615::"Neutral"}   T6 {OMTT/L:78425::"Neutral"}   T7 {OMTT/L:52126::"Neutral"}   T8 {OMTT/L:59195::"Neutral"}   T9 {OMTT/L:33877::"Neutral"}   T10 {OMTT/L:03897::"Neutral"}   T11 {OMTT/L:05598::"Neutral"}   T12 {OMTT/L:76457::"Neutral"}     Rib cage: Neutral     Lumbar Spine   L1 {OMTT/L:14938::"Neutral"}   L2 {OMTT/L:86691::"Neutral"}   L3 {OMTT/L:75465::"Neutral"}   L4 {OMTT/L:69388::"Neutral"}   L5 {OMTT/L:76631::"Neutral"}     Pelvis:  Innominate:{OMTinnom:08209}  Pubic bone:{OMTpub:90694}    Sacrum:{OMTsacrum:41593::"Neutral"}    Key   F= Flexed   E = Extended   R = Rotated   S = Sidebent   TTA = tissue texture abnormality     Assessment:      No diagnosis found.       Plan:   1. ***   - X-ray images of right foot taken today (AP, lateral, and oblique views) showed no abnormality. Images were personally reviewed with patient.  -  X-ray images of right tib/fib taken 4/21/23 (AP and lateral views) showed Focal cortical thickening with small associated lucency involving the anterior cortex of the mid tibial diaphysis, imaging findings typical of a stress fracture.. Images were personally reviewed with patient.  - MRI  images of right tib/fib taken 4/22/23 showed Anterior tibial cortex stress fracture with associated intracortical and periosteal edema.. Images were personally reviewed with patient.  - MRI images of right foot taken 4/23/23 showed No MR imaging findings to account for reported history of foot pain. Images were personally reviewed with patient.  - CT images of right tib/fib taken 4/24/23 showed Anterior tibial stress fracture. Images were personally reviewed with " patient.    2. OMT {DGLNUMBER:99125} regions. Oral consent obtained.  Reviewed benefits and potential side effects.   - OMT indicated today due to signs and symptoms as well as local and remote somatic dysfunction findings and their related neurokinetic, lymphatic, fascial and/or arteriovenous body connections.   - OMT techniques used: {OMT types:31163}   - Treatment was tolerated well. Improvement noted in segmental mobility post-treatment in dysfunctional regions. There were no adverse events and no complications immediately following treatment.     3. Pt. Given the following HEP:  A)   B)  83859 HOME EXERCISE PROGRAM (HEP):  The patient was taught a homegoing physical therapy regimen as described above. The patient demonstrated understanding of the exercises and proper technique of their execution. This interaction took 15 minutes.     4. Follow-up in *** {TIME; UNIT WEEK - MONTH W/PLURAL:90729} for reevaluation    5. Patient agreeable to today's plan and all questions were answered    This note is dictated using the M*Modal Fluency Direct word recognition program. There are word recognition mistakes that are occasionally missed on review.

## 2023-08-25 ENCOUNTER — TELEPHONE (OUTPATIENT)
Dept: SPORTS MEDICINE | Facility: CLINIC | Age: 23
End: 2023-08-25
Payer: COMMERCIAL

## 2023-08-25 ENCOUNTER — OFFICE VISIT (OUTPATIENT)
Dept: SPORTS MEDICINE | Facility: CLINIC | Age: 23
End: 2023-08-25
Payer: COMMERCIAL

## 2023-08-25 ENCOUNTER — HOSPITAL ENCOUNTER (OUTPATIENT)
Dept: RADIOLOGY | Facility: HOSPITAL | Age: 23
Discharge: HOME | End: 2023-08-25
Attending: NEUROMUSCULOSKELETAL MEDICINE & OMM
Payer: COMMERCIAL

## 2023-08-25 VITALS
BODY MASS INDEX: 27.98 KG/M2 | WEIGHT: 225.06 LBS | SYSTOLIC BLOOD PRESSURE: 123 MMHG | DIASTOLIC BLOOD PRESSURE: 72 MMHG | HEART RATE: 42 BPM | HEIGHT: 75 IN

## 2023-08-25 DIAGNOSIS — M84.361A STRESS FRACTURE OF RIGHT TIBIA: ICD-10-CM

## 2023-08-25 DIAGNOSIS — M84.361S STRESS FRACTURE OF RIGHT TIBIA, SEQUELA: Primary | ICD-10-CM

## 2023-08-25 DIAGNOSIS — R25.2 MUSCLE CRAMPING: Primary | ICD-10-CM

## 2023-08-25 PROCEDURE — 1159F MED LIST DOCD IN RCRD: CPT | Mod: CPTII,S$GLB,, | Performed by: NEUROMUSCULOSKELETAL MEDICINE & OMM

## 2023-08-25 PROCEDURE — 3008F PR BODY MASS INDEX (BMI) DOCUMENTED: ICD-10-PCS | Mod: CPTII,S$GLB,, | Performed by: NEUROMUSCULOSKELETAL MEDICINE & OMM

## 2023-08-25 PROCEDURE — 3074F SYST BP LT 130 MM HG: CPT | Mod: CPTII,S$GLB,, | Performed by: NEUROMUSCULOSKELETAL MEDICINE & OMM

## 2023-08-25 PROCEDURE — 3008F BODY MASS INDEX DOCD: CPT | Mod: CPTII,S$GLB,, | Performed by: NEUROMUSCULOSKELETAL MEDICINE & OMM

## 2023-08-25 PROCEDURE — 73590 X-RAY EXAM OF LOWER LEG: CPT | Mod: 26,RT,, | Performed by: RADIOLOGY

## 2023-08-25 PROCEDURE — 3078F PR MOST RECENT DIASTOLIC BLOOD PRESSURE < 80 MM HG: ICD-10-PCS | Mod: CPTII,S$GLB,, | Performed by: NEUROMUSCULOSKELETAL MEDICINE & OMM

## 2023-08-25 PROCEDURE — 99214 PR OFFICE/OUTPT VISIT, EST, LEVL IV, 30-39 MIN: ICD-10-PCS | Mod: S$GLB,,, | Performed by: NEUROMUSCULOSKELETAL MEDICINE & OMM

## 2023-08-25 PROCEDURE — 3074F PR MOST RECENT SYSTOLIC BLOOD PRESSURE < 130 MM HG: ICD-10-PCS | Mod: CPTII,S$GLB,, | Performed by: NEUROMUSCULOSKELETAL MEDICINE & OMM

## 2023-08-25 PROCEDURE — 99999 PR PBB SHADOW E&M-EST. PATIENT-LVL III: CPT | Mod: PBBFAC,,, | Performed by: NEUROMUSCULOSKELETAL MEDICINE & OMM

## 2023-08-25 PROCEDURE — 99999 PR PBB SHADOW E&M-EST. PATIENT-LVL III: ICD-10-PCS | Mod: PBBFAC,,, | Performed by: NEUROMUSCULOSKELETAL MEDICINE & OMM

## 2023-08-25 PROCEDURE — 1160F RVW MEDS BY RX/DR IN RCRD: CPT | Mod: CPTII,S$GLB,, | Performed by: NEUROMUSCULOSKELETAL MEDICINE & OMM

## 2023-08-25 PROCEDURE — 73590 XR TIBIA FIBULA 2 VIEW RIGHT: ICD-10-PCS | Mod: 26,RT,, | Performed by: RADIOLOGY

## 2023-08-25 PROCEDURE — 73590 X-RAY EXAM OF LOWER LEG: CPT | Mod: TC,RT

## 2023-08-25 PROCEDURE — 3078F DIAST BP <80 MM HG: CPT | Mod: CPTII,S$GLB,, | Performed by: NEUROMUSCULOSKELETAL MEDICINE & OMM

## 2023-08-25 PROCEDURE — 1160F PR REVIEW ALL MEDS BY PRESCRIBER/CLIN PHARMACIST DOCUMENTED: ICD-10-PCS | Mod: CPTII,S$GLB,, | Performed by: NEUROMUSCULOSKELETAL MEDICINE & OMM

## 2023-08-25 PROCEDURE — 1159F PR MEDICATION LIST DOCUMENTED IN MEDICAL RECORD: ICD-10-PCS | Mod: CPTII,S$GLB,, | Performed by: NEUROMUSCULOSKELETAL MEDICINE & OMM

## 2023-08-25 PROCEDURE — 99214 OFFICE O/P EST MOD 30 MIN: CPT | Mod: S$GLB,,, | Performed by: NEUROMUSCULOSKELETAL MEDICINE & OMM

## 2023-08-25 NOTE — PROGRESS NOTES
Subjective:     Josue Campos     Chief Complaint   Patient presents with    Right Lower Leg - Follow-up       Follow-up        Josue is a 23 y.o. male coming in today for right shin pain that began 1.5 year(s) ago, referred by Noemi WHITING. He was diagnosed with a right tibial stress fracture 04/21/23 that was managed by Dr. Hunt. Pt. describes the pain up to 6/10 sharp pain that does not radiate, but notes no pain currently. There was a fall/injury/ or trauma associated with the onset of symptoms, overuse. He is a collegiate  at [employer], recent transfer from [employer]. The pain is better with rest, PT and worse with jumping, running.  Patient had 2 recent MRIs over the summer at home and [place], noting no active bone edema.  Pt. Denies any other musculoskeletal complaints at this time.     Joint instability? no  Mechanical locking/clicking? no  Affecting ADL's? no  Affecting sleep? 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 No pertinent past medical history.  PAST SURGICAL HISTORY: History reviewed. No pertinent surgical history.  FAMILY HISTORY: History reviewed. No pertinent family history.  SOCIAL HISTORY:   Social History     Socioeconomic History    Marital status: Single     MEDICATIONS: No current outpatient medications on file.  ALLERGIES:   Review of patient's allergies indicates:  No Known Allergies    Reviewed office visit on 4/25/23 with Dr. Dirk Hunt : Josue would like to finish up his semester which would seem most reasonable.  I recommended keeping him out of basketball for now and will start him on calcium, vitamin D and discussed pros and cons of a bone stimulator and use of forteo.  They would like to discuss this with an orthopaedic surgeon in the [place] area as that is where they are from, and I provided them with the [place]. Kenneth [place]agland [place] [facility] [place]  I offered  to reach out to Dr. Ragland's office to make the connection.    IMAGIN. CT ordered due to right shin pain taken 23.   2. CT images were reviewed personally by me and then directly with patient.  3. FINDINGS: There is focal transverse lucency within the anterior tibial cortex corresponding to signal abnormality on prior MRI and compatible with stress fracture.  No additional fracture.  No lytic or blastic lesion. Soft tissues are unremarkable.  Achilles tendon is intact.  No knee or ankle effusion.  4. IMPRESSION: Anterior tibial stress fracture.    1. MRI ordered due to right foot pain taken 23.   2. MRI images were reviewed personally by me and then directly with patient.  3. FINDINGS:   LIGAMENTS: Lisfranc, dorsal talonavicular, bifurcate and dorsal calcaneocuboid ligaments are intact.  TENDONS: Visualized flexor and extensor tendons are normal.  BONES: No fracture.  No avascular necrosis.  No marrow infiltrative process.  JOINTS/CARTILAGE: Visualized chondral surfaces are preserved.  No high-grade partial or full-thickness chondral defects.  No osteochondral lesions.  No subchondral edema.  MUSCLES: Normal bulk and signal intensity.  MISCELLANEOUS: Hallux plantar plate complex and lesser MTP plantar plates appear within normal limits.  Visualized plantar aponeurosis is normal.  Sinus tarsi demonstrates preserved signal intensity.  Visualized subcutaneous soft tissues are within normal limits.  4. IMPRESSION: No MR imaging findings to account for reported history of foot pain.    1. MRI ordered due to right shin pain taken 23.   2. MRI images were reviewed personally by me and then directly with patient.  3. FINDINGS: BONES: Cortical thickening at the anterior mid tibia with an intracortical area of edema and surrounding periosteal edema.  No medullary marrow signal abnormality.  No avascular necrosis.  No marrow infiltrative process. MUSCLES: Normal bulk and signal intensity. MISCELLANEOUS:  "Visualized neurovascular structures appear   4. IMPRESSION: Anterior tibial cortex stress fracture with associated intracortical and periosteal edema.  No medullary signal abnormality.    1. X-ray ordered due to right shin pain. (AP and lateral views) taken 4/22/23.   2. X-ray images were reviewed personally by me and then directly with patient.  3. FINDINGS: Focal cortical thickening with small associated lucency involving the anterior cortex of the mid tibial diaphysis, imaging findings typical of a stress fracture. Fibula is intact.Soft tissues are unremarkable.  4. IMPRESSION: Abnormal study as above.    1. X-ray ordered due to right foot pain. (AP, lateral, and oblique views) taken 4/22/23.   2. X-ray images were reviewed personally by me and then directly with patient.  3. FINDINGS: No acute fracture or dislocation seen.  No soft tissue edema or radiopaque retained foreign body.  4. IMPRESSION: No acute osseous abnormality seen    Outside tibia/fibula MRI reports from 06/26/2023 and 08/09/2023 noted no signs of stress fracture or bony edema.  Patient did not come with imaging CD's for review.    Objective:     VITAL SIGNS: /72   Pulse (!) 42   Ht 6' 3" (1.905 m)   Wt 102.1 kg (225 lb 1.4 oz)   BMI 28.13 kg/m²    General    Vitals reviewed.  Constitutional: He is oriented to person, place, and time. He appears well-developed and well-nourished.   Neurological: He is alert and oriented to person, place, and time.   Psychiatric: He has a normal mood and affect. His behavior is normal.             MUSCULOSKELETAL EXAM  ANKLE: right ANKLE  The affected ankle is compared to the contralateral ankle.    Observation:    There is no edema, erythema, or ecchymosis.   Shoes reveal a normal wear pattern.  + pes planus  Normal callus pattern on the feet bilaterally.    Achilles tendon and calcaneal insertion reveals no deformities  No leg or intrinsic foot muscle atrophy.  Squatting reveals symmetric pronation of " the bilateral feet.   Able to rise on toes with symmetric supination.    Normal gait without evidence of antalgia.    ROM (* = with pain):  Active dorsiflexion to 20° on left and 20° on right  Active plantarflexion to 50° on left and 50° on right    Active ankle inversion to 35° on left and 35° on right  Active ankle eversion to 15° on left and 15° on right  Full active flexion/extension of the toes bilaterally.   Heel cords without tightness bilaterally.    Tenderness To Palpation:  No tenderness at the ATFL, CFL, PTFL, or deltoid ligaments  No tenderness over the distal anterior syndesmosis, distal tibia/fibula, fibular head/shaft  No tenderness at medial or lateral malleoli   No tenderness at navicular, cuboid, cuneiforms, talus, or calcaneous  No tenderness along the metatarsals or phalanges  No tenderness at the Achilles tendon calcaneal insertion  No tenderness at the posterior tibial or peroneal tendons    Strength Testing (* = with pain):  Dorsiflexion - 5/5 on left and 5/5 on right  Platarflexion - 5/5 on left and 5/5 on right  Resisted Inversion - 5/5 on left and 5/5 on right  Resisted Eversion - 5/5 on left and 5/5 on right  Great Toe Extension - 5/5 on left and 5/5 on right  Great Toe Flexion - 5/5 on left and 5/5 on right    Special Tests:  Anterior talar drawer - negative and without dimpling  Talar tilt - negative  Reverse Talar tilt - negative    Heel tap test - negative  Distal tib/fib squeeze test - negative  External rotation stress (Kleiger) test - negative  Valdez squeeze test - negative    Metatarsal squeeze test - negative  Midfoot stress test - negative  Calcaneal squeeze test - negative    Percussion over the anterior tibia does not produce pain or discomfort    Negative single leg hop test    No subluxation of the peroneal tendons with resisted eversion.    Vascular/Sensory Exam:  DP and PT pulses intact bilaterally  No skin changes, no abnormal hair distribution  Sensation intact to  light touch throughout the saphenous, sural, superficial peroneal, deep peroneal, and tibial nerve distributions  Negative Tinel's test over tarsal tunnel  2+/4 reflexes at L4 and S1 dermatomes  Capillary refill intact <2 seconds in all toes bilaterally.    IMAGIN. X-ray ordered due to right tibia pain. (AP and lateral views) taken today.   2. X-ray images were reviewed personally by me and then directly with patient.  3. FINDINGS: X-ray images obtained demonstrate findings of subacute stress fracture involving the mid anterior tibial diaphysis where there is again seen area of focal cortical thickening and associated anteriorly position transversely oriented lucency.  No new fractures.  No opaque soft tissue foreign bodies.  4. IMPRESSION: Reconfirmed now subacute stress fracture involving the anterior cortex of the mid tibial diaphysis with no new or detrimental changes compared to earlier exams.    Assessment:      Encounter Diagnosis   Name Primary?    Stress fracture of right tibia, sequela Yes          Plan:   1. History of right tibial stress fracture 1st diagnosed in April of this year.  Recent outside MRI imaging  reports noted no signs of bone marrow edema or recurrent stress fracture, however imaging unavailable for review.  Repeat tib-fib x-ray today showed a subacute stress fracture at the mid tibial diaphysis.  Patient currently not having any pain with daily activities and no pain reproducible on exam today.  Patient currently not cleared for full basketball activity due to underlying recent rhabdomyolysis.  - recommend slow progression back to basketball activity, limiting repetitive jumping, once patient cleared with Dr. Jaxson Gtz for recent rhabdomyolysis, given recent negative tib-fib MRI report and negative exam today.  - discussed with patient to notify AT, if pain increases with increase basketball activity, at which point we may need to do a trial on complete rest and consider use of  bone stimulator.   - Recommend OTC medial arch support for bilateral pes planus. Handout given (orange insoles)  - Vitamin D lab ordred  -  X-ray images of right tib/fib taken 4/21/23 (AP and lateral views) showed Focal cortical thickening with small associated lucency involving the anterior cortex of the mid tibial diaphysis, imaging findings typical of a stress fracture.. Images were personally reviewed with patient.  - MRI  images of right tib/fib taken 4/22/23 showed Anterior tibial cortex stress fracture with associated intracortical and periosteal edema.. Images were personally reviewed with patient.  - MRI images of right foot taken 4/23/23 showed No MR imaging findings to account for reported history of foot pain. Images were personally reviewed with patient.  - CT images of right tib/fib taken 4/24/23 showed Anterior tibial stress fracture. I  Outside tibia/fibula MRI reports from 06/26/2023 and 08/09/2023 noted no signs of stress fracture or bony edema.  Patient did not come with imaging CD's for review.    2. Follow-up in 1 week for reevaluation with Dr. Jaxson Gtz for continued rhabdomyolysis management and review of upcoming vitamin-D labs.     3. Patient agreeable to today's plan and all questions were answered.  Plan also discussed with AT, Anna Blanchard.     This note is dictated using the M*Modal Fluency Direct word recognition program. There are word recognition mistakes that are occasionally missed on review.

## 2023-08-28 ENCOUNTER — LAB VISIT (OUTPATIENT)
Dept: LAB | Facility: HOSPITAL | Age: 23
End: 2023-08-28
Attending: NEUROMUSCULOSKELETAL MEDICINE & OMM
Payer: COMMERCIAL

## 2023-08-28 DIAGNOSIS — R25.2 MUSCLE CRAMPING: Primary | ICD-10-CM

## 2023-08-28 DIAGNOSIS — M84.361A STRESS FRACTURE OF RIGHT TIBIA: ICD-10-CM

## 2023-08-28 DIAGNOSIS — R25.2 MUSCLE CRAMPING: ICD-10-CM

## 2023-08-28 LAB
25(OH)D3+25(OH)D2 SERPL-MCNC: 67 NG/ML (ref 30–96)
CK SERPL-CCNC: 8182 U/L (ref 20–200)
HGB S BLD QL SOLY: NEGATIVE

## 2023-08-28 PROCEDURE — 82550 ASSAY OF CK (CPK): CPT | Performed by: ORTHOPAEDIC SURGERY

## 2023-08-28 PROCEDURE — 82306 VITAMIN D 25 HYDROXY: CPT | Performed by: NEUROMUSCULOSKELETAL MEDICINE & OMM

## 2023-08-28 PROCEDURE — 85660 RBC SICKLE CELL TEST: CPT | Performed by: ORTHOPAEDIC SURGERY

## 2023-08-30 DIAGNOSIS — R25.2 MUSCLE CRAMPING: Primary | ICD-10-CM

## 2023-08-31 ENCOUNTER — LAB VISIT (OUTPATIENT)
Dept: LAB | Facility: HOSPITAL | Age: 23
End: 2023-08-31
Attending: ORTHOPAEDIC SURGERY
Payer: COMMERCIAL

## 2023-08-31 DIAGNOSIS — R25.2 MUSCLE CRAMPING: ICD-10-CM

## 2023-08-31 LAB — CK SERPL-CCNC: 1512 U/L (ref 20–200)

## 2023-08-31 PROCEDURE — 82550 ASSAY OF CK (CPK): CPT | Performed by: ORTHOPAEDIC SURGERY

## 2023-08-31 PROCEDURE — 36415 COLL VENOUS BLD VENIPUNCTURE: CPT | Mod: PN | Performed by: ORTHOPAEDIC SURGERY

## 2023-09-05 ENCOUNTER — ATHLETIC TRAINING SESSION (OUTPATIENT)
Dept: SPORTS MEDICINE | Facility: CLINIC | Age: 23
End: 2023-09-05
Payer: COMMERCIAL

## 2023-09-05 DIAGNOSIS — R25.2 MUSCLE CRAMPING: Primary | ICD-10-CM

## 2023-09-05 NOTE — PROGRESS NOTES
Subjective:       Chief Complaint: Josue Campos is a 23 y.o. male student at [employer] who had concerns including Return from Rhabdomyolysis .    Handedness: right-handed  Sport played: basketball      Level: college                Objective:       General: Josue is well-developed, well-nourished, appears stated age, in no acute distress, alert and oriented to time, place and person.       Assessment:   Return to activity from high CPK levels    Status: L - Limited    Date Out: N/A     Date Cleared: N/A      Plan:           [facility]       Athletic Training Phone/Text Conversation     Name: Josue Campos  Clinic Number: [id number]  Sport: Men's Basketball      Notes     9/5/2023  Pt stated he is feeling better. his muscles are tight after stationary bike but nothing like the stiffness was before   Stationary bike 15mins     9/6/2023  Foam roll   Dynamic warm up  Stationary Bike for 15mins light RPE    9/7/2023  Foam Roll  Dynamic warm up  Stationary Bike 15mins light RPE 15mins moderate RPE  Stretches   Quad 3x30s  Hamstring 3x30s  Abductors 3x30s    9/8/2023  Foam Roll   Dynamic warm up  Stationary Bike 15mins moderate 15mins hard   Stretches   Quad 3x30s  Hamstring 3x30s  Abductors 3x30s    Comments  Pt stated he is doing well no increased soreness, lack of sleep or dark urine.     Anna Blanchard, SHAZIA, LAT, ATC     [facility] [employer] [place] [facility]    [email or web address]

## 2023-09-11 ENCOUNTER — ATHLETIC TRAINING SESSION (OUTPATIENT)
Dept: SPORTS MEDICINE | Facility: CLINIC | Age: 23
End: 2023-09-11
Payer: COMMERCIAL

## 2023-09-11 DIAGNOSIS — M62.82 EXERTIONAL RHABDOMYOLYSIS: Primary | ICD-10-CM

## 2023-09-11 DIAGNOSIS — R25.2 MUSCLE CRAMPING: ICD-10-CM

## 2023-09-11 NOTE — PROGRESS NOTES
"Subjective:       Chief Complaint: Josue Campos is a 23 y.o. male student at [employer] who had no chief complaint listed for this encounter.    Handedness: right-handed  Sport played: basketball      Level: college                ROS              Objective:       General: Josue is well-developed, well-nourished, appears stated age, in no acute distress, alert and oriented to time, place and person.           Assessment:     Status: L - Limited    Date Out: N/A    Date Cleared: TBD      Plan:         [facility]       Athletic Training Daily Treatment/Rehab Note     Name: Josue Campos  Clinic Number: [id number]  Sport:male  basketball       Team Physicians: Dr. Jaxson Gtz, Dr. Rosa Isela Gtz, Dr. Risa Childers     Visit Date: 9/11/2023    Week Two of Return to play    Subjective     Student-athlete reports/CC: Pt stated he is doing a lot better and is not experiencing any soreness or lack of sleep do to activity last week        Daily Treatment/Rehab   9/11/2023  Foam roll upper/lower body  Dynamic warm up  Resistance band muscle activation (red band at ankle)  Lat walks, forward/backward walks, hip flexion, extension, abduction  Jog two laps around basketball court  Stretches Lower leg and upper body  Subjective: Pt stated he is doing well but feels more sore and fatigued than he usually does but noting compared to the first day after initial muscle cramping    9/12/2023  Foam roll upper and lower body  Dynamic warm up  Resistance band muscle activation (red band at ankle)  Lat walks, forward/backward walks, hip flexion, extension, abduction  Running: down and back 2 times 60"yards not timed   Stretches upper body/lower body    9/13/2023  Foam Roll upper/lower body  20% of 1 rep max with team  Dynamic warm up  Running: down and back 2 times 60"yards not timed   Stretches upper/ lower body    9/14/2023  Josue completed therapeutic exercises to develop " strength and endurance     Foam roll upper and lower body  Dynamic warm up  Resistance band muscle activation (red band at ankle)  Lat walks, forward/backward walks, hip flexion, extension, abduction    Exercise Reps/Sets/Time Weight #   Lunges     Bridges on ball     Supermans 2x5 10s hold    Push ups 2x10    Tricep Dips 2x5    Running 30 yards (basketball court) not timed 4x                                           9/15/2023  Warm up:Biking 20 mins  Running every other Hill with team.   Pt stated he felt a pop in his hamstring when running the last hill.   Evaluation completed. Assessment Grade 1 hamstring strain. Pt will be reevaluated on Monday       Assessment     Muscle cramping   Exertional Rhabdomyolysis     Plan     Continue gradual return to activity         Anna MCCRAY, LAT, ATC    [employer] [facility] [place]

## 2023-09-13 ENCOUNTER — TELEPHONE (OUTPATIENT)
Dept: SPORTS MEDICINE | Facility: CLINIC | Age: 23
End: 2023-09-13
Payer: COMMERCIAL

## 2023-09-13 DIAGNOSIS — R25.2 MUSCLE CRAMPING: ICD-10-CM

## 2023-09-13 DIAGNOSIS — M62.82 EXERTIONAL RHABDOMYOLYSIS: Primary | ICD-10-CM

## 2023-09-13 NOTE — TELEPHONE ENCOUNTER
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 He admits to feeling greatly improved and has been progressing his activity as tolerated. He endorses continuing to feel overall fatigued but has not had muscle pain, swelling, weakness.     Physical Exam:  MUSCULOSKELETAL EXAM:    Strength testing (bilaterally):  Deltoid - 5/5  Biceps - 5/5  Triceps - 5/5  Wrist extension - 5/5  Wrist flexion - 5/5   - 5/5  Finger extension - 5/5  Finger abduction - 5/5    Strength testing (bilaterally)  Knee Flexion - 5/5  Knee Extension - 5/5  Hip Flexion - 5/5  Hip Extension - 5/5  Ankle dorsiflexion - 5/5  Ankle Plantarflexion - 5/5    Assessment:    1. Exertional rhabdomyolysis    2. Muscle cramping          Plan:     Doing well. Improving as expected. Labs trending appropriately and is consistent with recovery.    Hydration - Continue with hydration, consider addition of on of electrolytes    Exercises - Continue with progressing exercise and basketball activity as tolerated    Follow up - As needed. Possible lab work for other causes of fatigue            This note was completed in the presence of the physician noted above    This note is dictated using the M*Modal Fluency Direct word recognition program. There are word recognition mistakes that are occasionally missed on review.

## 2023-09-18 ENCOUNTER — ATHLETIC TRAINING SESSION (OUTPATIENT)
Dept: SPORTS MEDICINE | Facility: CLINIC | Age: 23
End: 2023-09-18
Payer: COMMERCIAL

## 2023-09-18 DIAGNOSIS — R25.2 MUSCLE CRAMPING: ICD-10-CM

## 2023-09-18 DIAGNOSIS — S76.311A HAMSTRING STRAIN, RIGHT, INITIAL ENCOUNTER: Primary | ICD-10-CM

## 2023-09-18 NOTE — PROGRESS NOTES
Subjective:       Chief Complaint: Josue Campos is a 23 y.o. male student at [employer] who had no chief complaint listed for this encounter.    Handedness: right-handed  Sport played: basketball      Level: college                ROS              Objective:       General: Josue is well-developed, well-nourished, appears stated age, in no acute distress, alert and oriented to time, place and person.     AT Session          Assessment:     Status: {AMB AT Status:96970371}    Date Out: ***    Date Cleared: ***      Plan:       1. ***  2. Physician Referral: {YES/NO:20292}  3. ED Referral: {YES/NO:20292}  4. Parent/Guardian Notified: {Athlete Parent Communication:16712}  5. All questions were answered, ath. will contact me for questions or concerns in  the interim.  6.         Eligible to use School Insurance: { SCHOOL INSURANCE:26123}

## 2023-09-18 NOTE — PROGRESS NOTES
Subjective:       Chief Complaint: Josue Campos is a 23 y.o. male student at [employer] who had concerns including Right hamstring strain.    9/15/2023  R Hamstring strain  Pt stated he was running hills during fitness and on the last run he felt a sharp pain and pop in his R hamstring. 6/10 pain. Can feel pain when walking. PMH has strained a hamstring in the past but cannot remember which leg. Pt stated he is not sure if this pain is as bad as the last time he strained his hamstring. No numbness and tingling, no obvious deformity, swelling or ecchymosis. Tight upon palpation in belly of hamstring  and TTP    Handedness: right-handed  Sport played: basketball      Level: college              Review of Systems   All other systems reviewed and are negative.                Objective:       General: Josue is well-developed, well-nourished, appears stated age, in no acute distress, alert and oriented to time, place and person.         General Musculoskeletal Exam   Gait: normal       Right Knee Exam     Range of Motion   Extension:  normal Right knee extension grade: pain present.  Flexion:  normal     Other   Muscle Tightness: hamstring tightness  Sensation: normal    Left Knee Exam   Left knee exam is normal.    Right Hip Exam     Inspection   Swelling: absent  Bruising: absent  No deformity of hip.    Range of Motion   Abduction:  normal   Adduction:  normal   Right hip extension: WNL but pain present.   Flexion:  normal   External rotation:  normal   Internal rotation:  normal     Other   Sensation: normal  Left Hip Exam   Left hip exam is normal.      Back (L-Spine & T-Spine) / Neck (C-Spine) Exam     Back (L-Spine & T-Spine) Tests   Right Side Tests  Squat Test: unable to perform      Muscle Strength   Right Lower Extremity   Hip Abduction: 5/5   Hip Adduction: 5/5   Quadriceps:  5/5   Hamstrin/5 (pain present)             Assessment:   Hamstring strain Grade 1    Status: O - Out    Date Out: N/A    Date  Cleared: N/A      Plan:       Estim pre mod 20mins with ice was applied to hamstring to decrease pain. Pt will start weekly rehab to regain strength stability and decrease pain in the hamstring.  2. Physician Referral: no  3. ED Referral: no  4. Parent/Guardian Notified: No  5. All questions were answered, ath. will contact me for questions or concerns in  the interim.  6.         Eligible to use School Insurance: Yes

## 2023-09-18 NOTE — PROGRESS NOTES
Subjective:       Chief Complaint: Josue Campos is a 23 y.o. male student at [employer] who had concerns including Right hamstring pain.    Weekly running note  9/18/2023    Handedness: right-handed  Sport played: basketball      Level: college              Review of Systems   All other systems reviewed and are negative.                Objective:       General: Josue is well-developed, well-nourished, appears stated age, in no acute distress, alert and oriented to time, place and person.     AT Session          Assessment:     Status: O - Out    Date Out: 9/18/2023    Date Cleared: TBD      Plan:     [facility]  Date:9/22/2023    Subjective: Pt stated he is feeling better and has minimal pain when walking     Objective: palpably tight through biceps femoris, no deformity present.      Josue completed therapeutic exercises to develop strength and flexibility       Exercise Reps/Sets/Time Weight #   Sciatic nerve glides 1x15    Quad sets [id number]    Glute Set [id number]    Side lying abduction 2x10    Bosu ball SL balance 3x30s    Calf stretch 3 way 3x30s    Hamstring stretch 3x30s                                           Josue received the following  modalities and/or manual therapy techniques:      Modality/Manual Therapy Time   Estim w/ heating pad 15mins   Soft tissue mobilization / Active release  5mins                            SHAZIA Viveros, LAT, ATC    [facility] [employer] [facility]     [email or web address]   [email or web address]      --------------------------------------------------------------------------------------------------------------------------------------------------------------------------------------------------------      [facility]  Date:9/21/2023    Subjective: Pt stated he is feeling a  little better today but still sore and has pain when walking    Objective: palpably tight through biceps femoris, no deformity present.      Josue completed therapeutic exercises to develop strength and flexibility       Exercise Reps/Sets/Time Weight #   Sciatic nerve glides 1x15    Quad sets [id number]    Glute Set [id number]    Side lying abduction 2x10    Bosu ball SL balance 3x30s    Calf stretch 3 way 3x30s    Hamstring stretch 3x30s                                           Josue received the following  modalities and/or manual therapy techniques:      Modality/Manual Therapy Time   Ultrasound 1mhz 50% 1.2 intensity  5mins   Soft tissue mobilization 5mins   KT tape applied                         Anna Blanchard, MUMTAZT, LAT, ATC    [facility] [employer] [facility]     [email or web address]   [email or web address]    ---------------------------------------------------------------------------------------  ---------------------------------------------------------------------------------------       [facility]  Date:9/20/2023    Subjective: Pt stated he is feeling very sore today. During Glute bridges pt began to cramp in his R leg    Objective: palpably tight through biceps femoris, no deformity present.      Josue completed therapeutic exercises to develop strength and flexibility       Exercise Reps/Sets/Time Weight #   Dynamic warm up 5mins    Heel digs 2x10    Glute bridge 2x10    Knee extension 2x10 red   Sidelying hip abduction 3x10    Sciatic Nerve glides 1x15                                                Josue received the following  modalities and/or manual therapy techniques:      Modality/Manual Therapy Time   Ultrasound 1mhz 50% 1.2 intensity  5mins   Soft tissue mobilization 5mins                            SHAZIA Viveros, LAT, ATC    [facility] [place]     [facility]     [email or web address]   [email or web address]    ---------------------------------------------------------------------------------------  ---------------------------------------------------------------------------------------     [facility]  Date:9/19/2023    Subjective: Pt stated he is feeling tight today but no increased pain    Objective: palpably tight through biceps femoris, no deformity present.      Josue completed therapeutic exercises to develop strength and flexibility       Exercise Reps/Sets/Time Weight #   Dynamic warm up 5mins    Heel digs 2x10    Glute bridge 2x10    Hip abduction side lying 2x10    Sciatic Nerve glides 1x15    Bike 5mins light/ 5mins moderate 10mins                                           Josue received the following  modalities and/or manual therapy techniques:      Modality/Manual Therapy Time   Ultrasound 1mhz 50% 1.2 intensity  5mins   Soft tissue mobilization 5mins                            Anna Blanchard, MUMTAZT, LAT, ATC    [facility]     [email or web address]   [email or web address]    ---------------------------------------------------------------------------------------  ---------------------------------------------------------------------------------------       [facility]  Date:9/18/2023    Subjective: Pt stated he is doing a little better than Friday but he has pain when walking around.    Objective: palpably tight through biceps femoris, no deformity present.      Josue completed therapeutic exercises to develop strength and flexibility       Exercise Reps/Sets/Time Weight #   Dynamic warm up 5mins    Heel digs 2x10    Glute bridge 2x10    Hip abduction side lying 2x10    Sciatic Nerve glides 1x15                                                 Clairekishorebushra received the following  modalities and/or manual therapy techniques:      Modality/Manual Therapy Time   Active release/Massage 10mins                                SHAZIA Viveros, LAT, ATC    [facility] [place]    [facility]     [email or web address]   [email or web address]

## 2023-09-26 ENCOUNTER — ATHLETIC TRAINING SESSION (OUTPATIENT)
Dept: SPORTS MEDICINE | Facility: CLINIC | Age: 23
End: 2023-09-26
Payer: COMMERCIAL

## 2023-09-26 DIAGNOSIS — S76.311D STRAIN OF RIGHT HAMSTRING, SUBSEQUENT ENCOUNTER: Primary | ICD-10-CM

## 2023-09-26 NOTE — PROGRESS NOTES
"Subjective:       Chief Complaint: Josue Campos is a 23 y.o. male student at [employer] who had concerns including Right Hamstring Strain.    Handedness: right-handed  Sport played: basketball      Level: college                Review of Systems   All other systems reviewed and are negative.                Objective:       General: Josue is well-developed, well-nourished, appears stated age, in no acute distress, alert and oriented to time, place and person.     AT Session          Assessment:     Status: L - Limited    Date Out: N/A    Date Cleared: N/A      Plan:     ---------------------------------------------------------------------------------------  ---------------------------------------------------------------------------------------    [facility]  Date:9/29/2023    Subjective: Pt stated he is doing better progressing well    Objective: no ecchymosis, discoloration, swelling present. Palpably tight along the biceps femoris below initial pain sight.       Josue completed therapeutic exercises to develop strength and flexibility      Nerve glides 2x10  Hip extension knee flexed 2x8 5" hold  Hip extension knee extended 2x8  3" hold   Glute bridges w/ball 2x10  Glute bridges w/ band 2x10  Side lying hip abduction 3x8 w/4 pounds hold 3"  Side lying adduction 3x8 w/ 4pounds hold 3"  Plank with alternating leg 3x30"  Hamstring curls 3x8 4 pounds  Forward slides 3x10 both legs  Lateral slides 3x10 both legs  Backward slides 3x10 both legs  3 way Calf stretches  Hamstring stretches 3x20"  Calf stretches 3x30"    Bike 5mins light RPE 20mins moderate RPE        Braelee received the following  modalities and/or manual therapy techniques:      Modality/Manual Therapy Time   Combo (ultrasound 1.2 Intensity 1mhz 100% 5mins /estim)   Estim 10min                                SHAZIA Viveros, LAT, ATC    [facility] "[facility]     [email or web address]   [email or web address]    ---------------------------------------------------------------------------------------  ---------------------------------------------------------------------------------------    [facility]  Date:9/28/2023    Subjective: Pt stated he is doing better progressing well    Objective: no ecchymosis, discoloration, swelling present. Palpably tight along the biceps femoris below initial pain sight.       Josue completed therapeutic exercises to develop strength and flexibility    Test: again 75% resisted knee flexion no pain   Test Squat 1x5 no pain  Nerve glides 2x10  Hip extension knee flexed 2x8 5"hold  Hip extension knee extended 2x8  3" hold   Glute bridges w/ball 2x10  Glute bridges w/ band 2x10  Side lying hip abduction 3x8 w/4 pounds hold 3"  Side lying adduction 3x8 w/ 4pounds hold 3"  Plank with alternating leg 3x30"  Hamstring curls 3x8 4 pounds  Forward slides 3x10 both legs  Lateral slides 3x10 both legs  Backward slides 3x10 both legs  3 way Calf stretches 3x30"  Hamstring stretches 3x20"  Calf stretches 3x30"    Bike 5mins light RPE 20mins moderate RPE    Josue received the following  modalities and/or manual therapy techniques:      Modality/Manual Therapy Time   Combo (ultrasound 1.2 Intensity 1mhz 100% 5mins /estim)   Estim 10min                                Anna Blanchard, MUMTAZT, LAT, ATC    [facility] [email or web address]   [email or web address]      ---------------------------------------------------------------------------------------  ---------------------------------------------------------------------------------------    [facility]  Date:9/27/2023    Subjective: Jony stated he is doing " "better     Objective: no ecchymosis, discoloration, swelling present. Palpably tight along the biceps femoris below initial pain sight.       Josue completed therapeutic exercises to develop strength and flexibility    No pain with squats   Nerve glides 15  Glute bridges w/ball 2x10  Glute bridges w/ band 2x10  Side lying hip abduction 3x8 w/ 4# hold 3"  Side lying adduction 3x8 w/ 4# hold 3"  Plank with alternating leg 3x30"  Hamstring curls 3x8 4#  Hip extension knee flexed 2x8 hold for 3"  Hip extension knee extended 2x8 hold for 3"  Lateral slides 3x10  Backward slides 3x10  Speed walking 4 laps around full court  3 way Calf stretches 3x30"  Hamstring stretches 3x20"    Bike 5mins light RPE 20mins moderate RPE    Josue received the following  modalities and/or manual therapy techniques:      Modality/Manual Therapy Time   STM /Active release 5mins   Ultrasound 1.2 intensity 1mhz 100% 5mins                            Anna Blanchard, SHAZIA, LAT, ATC    [facility]     [email or web address]   [email or web address]    ---------------------------------------------------------------------------------------  ---------------------------------------------------------------------------------------     [facility]  Date:9/26/2023    Subjective: Pt stated he is doing a lot better     Objective: no ecchymosis, discoloration, swelling present. Palpably tight along the biceps femoris below initial pain sight.       Josue completed therapeutic exercises to develop strength and flexibility    Test: 75% resisted knee flexion no pain   No pain with squats (1x5)  Nerve glides 15  Long arc quad 10x20"  Glute bridges 3x8  Side lying hip abduction 3x8 w/ 4# hold 3"  Side lying adduction 3x8 w/ 4# hold 3"  Plank with alternating leg 3x30"  Hamstring curls 2x8 2# for 1 set and 3# for 1 " "set  Hip extension knee flexed 2x8 hold for 3"  Hip extension knee extended 2x8 for 3"  Lateral slides 3x10  3 way Calf stretches 3x30'  Hamstring stretches 3x20"  Calf stretches 3x30      Bike 5mins light RPE 15mins moderate RPE      Josue received the following  modalities and/or manual therapy techniques:      Modality/Manual Therapy Time   STM /Active release 5mins   Ultrasound 1mhz 50% 1.2 intensity  5mins                            Anna Blanchard, SHAZIA, LAT, ATC    [facility]     [email or web address]   [email or web address]    ---------------------------------------------------------------------------------------  ---------------------------------------------------------------------------------------    [facility]  Date:9/25/2023    Subjective: Pt stated he is doing better and was able to complete 15 mins of light RPE stationary biking     Objective: no ecchymosis, discoloration, swelling present. Palpably tight along the biceps femoris below initial pain sight.       Josue completed therapeutic exercises to develop strength and flexibility     Nerve glides 15  Long arc quad 5x10"  Glute bridges 3x8  Side lying hip abduction 3x8 w/ 4# hold 3"  Side lying adduction 3x8 w/ 4# hold 3"  Plank with alternating leg 3x30"  Hip extension knee flexed 3x8  3 way calf stretches 3x30"  Hamstring stretches 3x20"  Calf stretches 3x30"    Bike: 10mins light RPE 10mins moderate RPE      Josue received the following  modalities and/or manual therapy techniques:      Modality/Manual Therapy Time   STM /Active release 5mins   Ultrasound 1mhz 50% 1.2 intensity  5mins                           Anna Blanchard MSAT, LAT, ATC    [facility] [place]    [facility]     [email or web address]   [email or web address]    ---------------------------------------------------------------------------------------  ---------------------------------------------------------------------------------------

## 2023-10-02 ENCOUNTER — ATHLETIC TRAINING SESSION (OUTPATIENT)
Dept: SPORTS MEDICINE | Facility: CLINIC | Age: 23
End: 2023-10-02
Payer: COMMERCIAL

## 2023-10-02 DIAGNOSIS — S76.311D STRAIN OF RIGHT HAMSTRING, SUBSEQUENT ENCOUNTER: Primary | ICD-10-CM

## 2023-10-03 NOTE — PROGRESS NOTES
"Subjective:       Chief Complaint: Josue Campos is a 23 y.o. male student at [employer] who had concerns including right hamstring strain.    Handedness: right-handed  Sport played: basketball      Level:            Review of Systems   All other systems reviewed and are negative.                Objective:       General: Josue is well-developed, well-nourished, appears stated age, in no acute distress, alert and oriented to time, place and person.     AT Session          Assessment:     Status: O - Out    Date Out: 9/15/2023    Date Cleared: N/A      Plan:     10/6/2023  No rehab today.   Pt was given below exercises to complete on his own over the weekend     Foam roll  Sciatic nerve glides  Backward slides 3x10  Lateral slider 3x10  Glute bridges 3x15 3" hold  SL hamstring raise extension 3x10 3"s hold    ----------------------------------------------------------------------------------------------------------------------------  ----------------------------------------------------------------------------------------------------------------------------  [facility]  Date:10/5/2023    Subjective: Pt stated he is doing well does not have increased pain    Objective: no ecchymosis, deformity swelling present.    Comment: pt is progressing well with no increased pain with increased activity    Josue completed therapeutic exercises to develop strength     Nerve glides 2x10  SL Raise 3x10 4lbs  Glute bridge w/ ball 3x15 3" hold  Plank with alternating legs 3x30"  Hip extension knee flexed 3x20 3sec hold  Hamstring curls 3x10  Hamstring tantrums 4x20"  [place] falls modified 3x8  Lateral lunges 2x10  Backward slides 3x10  Step ups 3x10  Lat walks 3x20  Partial squats hold for 3x30" hold   Hamstring stretch 3x30"  Calf stretch 3x30"  Jog with team when team is running during practice  Lateral and backwards movements with cones      Josue received the " "following  modalities and/or manual therapy techniques:      Modality/Manual Therapy Time   STM/Active release 5mins                                SHAZIA Viveros, LAT, ATC    [facility]     [email or web address]   [email or web address]    ---------------------------------------------------------------------------------------  ---------------------------------------------------------------------------------------    [facility]  Date:10/4/2023    Subjective: Pt stated he is doing well does not have increased pain    Objective: no ecchymosis, deformity swelling present.    Comment: pt is progressing well with no increased pain with increased activity    Josue completed therapeutic exercises to develop strength       Test 100% hamstring strength: 5/5 no pain  Nerve glides 2x10  SL Raise 3x10 4lbs  Glute bridge w/ ball 3x15 3" hold  Plank with alternating legs 3x30"  Hip extension knee flexed 3x20 3sec hold  Hamstring curls 3x10 4lbs  Hamstring tantrums 4x20"  [place] modified 3x8  Lateral lunges 2x10  Backward slides 3x10  Step ups 3x10  Lat walks 3x20  Partial squats with ball 3x10  Hamstring stretch 3x30"  Calf stretch 3x30"  Jog with team when team is sprinting during practice.        Josue received the following  modalities and/or manual therapy techniques:      Modality/Manual Therapy Time   STM/Active release 5mins                                SHAZIA Viveros, LAT, ATC    [facility]     [email or web address]    ---------------------------------------------------------------------------------------  ---------------------------------------------------------------------------------------    [facility]" "Sports Medicine  Date:10/3/2023    Subjective: Pt stated he is doing well does not have increased pain    Objective: no ecchymosis, deformity swelling present.    Comment: pt is progressing well with no increased pain with increased activity. Pt is now cleared to start workouts with  non contact skills work    Josue completed therapeutic exercises to develop strength     Test:100% hamstring MMT 5/5   10 squats no pain    Nerve glides 2x10  SLR 2x10 ankle weight 4lbs  Glute bridges w/ball 2x10 3" hold  Glute bridges w/ band 2x10 3" hold  Plank with alternating leg 3x30s  Hip extension knee flexed 2x10 5 sec hold  Hamstring tantrums 4x20s  Hamstring curls 2x10 4 pounds  Lateral slides 3x10 both legs  Backward slides 3x10 both legs  [place] modified 2x8  Step ups 3x10  Lat walks 3x20  Calf stretch 3x30"  Hamstring stretch 3x30"  Jogging 5mins    Josue received the following  modalities and/or manual therapy techniques:      Modality/Manual Therapy Time   None                                 Anna Blanchard, SHAZIA, LAT, ATC    [facility]     [email or web address]   [email or web address]    ---------------------------------------------------------------------------------------  ---------------------------------------------------------------------------------------    [facility]  Date:10/2/2023    Subjective: Pt stated he is doing well does not have increased pain    Objective: no ecchymosis, deformity swelling present.      Braelee completed therapeutic exercises to develop strength     Test:10 squats (no pain)    Nerve glides 2x10  SLR 2x10 ankle weight 4lbs  Glute bridges w/ball 2x10 3" hold  Glute bridges w/ band 2x10 3" hold  Plank with alternating leg 3x30"  Hip extension knee flexed 2x10 5" hold  Hamstring curls 2x10 4 pounds  Lateral slides 3x10 both " "legs  Backward slides 3x10 both legs  [place] modified 2x8  Step ups 3x10  Lat walks 3x20  Calf stretch 3x30"  Hamstring stretch 3x30"  Speed walking 5mins  Bike 5mins light RPE 15mins moderate RPE 2mins hard RPE        Braelee received the following  modalities and/or manual therapy techniques:      Modality/Manual Therapy Time   STM/Active release 5mins                                Anna Blanchard, MUMTAZT, LAT, ATC    [facility]     [email or web address]   [email or web address]    ---------------------------------------------------------------------------------------  ---------------------------------------------------------------------------------------               "

## 2023-10-10 ENCOUNTER — ATHLETIC TRAINING SESSION (OUTPATIENT)
Dept: SPORTS MEDICINE | Facility: CLINIC | Age: 23
End: 2023-10-10
Payer: COMMERCIAL

## 2023-10-10 DIAGNOSIS — S76.311D STRAIN OF RIGHT HAMSTRING, SUBSEQUENT ENCOUNTER: Primary | ICD-10-CM

## 2023-10-10 NOTE — PROGRESS NOTES
"Subjective:       Chief Complaint: Josue Campos is a 23 y.o. male student at [employer] who had concerns including Right Hamstring.    10/10/23   Rehab only    Handedness: right-handed  Sport played: basketball      Level: college            Review of Systems   All other systems reviewed and are negative.        Objective:       General: Josue is well-developed, well-nourished, appears stated age, in no acute distress, alert and oriented to time, place and person.     AT Session          Assessment:     Status: L - Limited  Running with team non contact 10/10/2023  Half court contact practice 10/11/23    Date Seen:  10/10/2023-10/12/2023    Date of Injury:  9/15/2023    Date Out:  9/15/2023    Date Cleared:  LIZ      Plan:     ---------------------------------------------------------------------------------------------------------------------------  ----------------------------------------------------------------------------------------------------------------------------  [facility] [place]  [facility]  Date:10/13/2023  Pt was sick and unable to come to practice    ---------------------------------------------------------------------------------------  ---------------------------------------------------------------------------------------    [facility] [place]  [facility]  Date:10/12/2023    Subjective: Pt stated he is doing well does not have increased pain    Objective: no ecchymosis, deformity swelling present.      Josue completed therapeutic exercises to develop strength    Heat pack 10mins  Nerve glides 2x10  Bridge w/ band 3x10  Body squats 3x10  Lat walks 2x20  Forward/Lateral Step ups 2x10  Lateral Lunge  3x10  Backwards lunge 3x10  Hamstring curls 3x10 5lbs  Hamstring tantrums 3x30"  Ball roll out ins 3x10  Hamstring falls 3x10  Calf stretch 3x30"  Hamstring stretch 3x30"      Josue received the " "following  modalities and/or manual therapy techniques:      Modality/Manual Therapy Time   STM/Active release 5mins                                SHAZIA Viveros, LAT, ATC    [facility]     [email or web address]   [email or web address]    ---------------------------------------------------------------------------------------  ---------------------------------------------------------------------------------------    [facility]  Date:10/11/2023    Subjective: Pt stated he is doing well does not have increased pain    Objective: no ecchymosis, deformity swelling present.    Comment: pt is progressing well with no increased pain with increased activity. Pt is now cleared to start half court contact practice    Josue completed therapeutic exercises to develop strength     Heat pack 10mins    Nerve glides 2x10  Bridge w/ band 3x10  Body squats 3x10  Lat walks 2x20  Forward/Lateral Step ups 2x10  Lateral Lunge  2x10  Backwards lunge 2x10  Hamstring curls 2x10 5lbs  Hamstring tantrums 3x30"  Ball roll out ins 3x10  Hamstring falls 3x10  Calf stretch 3x30"  Hamstring stretch 3x30"    Josue received the following  modalities and/or manual therapy techniques:      Modality/Manual Therapy Time   None                                 SHAZIA Viveros, LAT, ATC    [facility]     [email or web address]   [email or web address]    ---------------------------------------------------------------------------------------  ---------------------------------------------------------------------------------------    [facility] [place]  [facility]  Date:10/10/2023    Subjective: Pt stated he is doing well jogged and got on the bike over the break    Objective: no " "ecchymosis, deformity swelling present.      Josue completed therapeutic exercises to develop strength    Heat pack 10mins  Bike 5mins  Nerve glides 2x10  Bridge w/ band 3x10  Body squats  3x10  Forward/Lateral Step ups 2x10  Lateral Lunge  2x10  Backwards lunge 2x10  Hamstring curls 2x10 5lbs  Hamstring tantrums 3x30"  Calf stretch 3x30"  Hamstring stretch 3x30"    Test:  Lateral movement side shuffles, sprints short distance quick change movements)  No pain during test.    Josue received the following  modalities and/or manual therapy techniques:      Modality/Manual Therapy Time   None                                 Anna Blanchard, MUMTAZT, LAT, ATC    [facility]     [email or web address]   [email or web address]    ---------------------------------------------------------------------------------------  ---------------------------------------------------------------------------------------             "

## 2023-10-16 ENCOUNTER — ATHLETIC TRAINING SESSION (OUTPATIENT)
Dept: SPORTS MEDICINE | Facility: CLINIC | Age: 23
End: 2023-10-16
Payer: COMMERCIAL

## 2023-10-16 DIAGNOSIS — S76.311D STRAIN OF RIGHT HAMSTRING, SUBSEQUENT ENCOUNTER: Primary | ICD-10-CM

## 2023-10-17 NOTE — PROGRESS NOTES
"Subjective:       Chief Complaint: Josue Campos is a 23 y.o. male student at [employer] who had no chief complaint listed for this encounter.      Review of Systems   All other systems reviewed and are negative.      Objective:       General: Josue is well-developed, well-nourished, appears stated age, in no acute distress, alert and oriented to time, place and person.     AT Session          Assessment:     Status: L - Limited  10/17/2023 half court contact/ End game situations full court contact 5mins    Date Seen:  10/16/2023-10/19/2023    Date of Injury:  9/15/2023    Date Out:  9/15/2023    Date Cleared:  TBD      Plan:   ---------------------------------------------------------------------------------------  ---------------------------------------------------------------------------------------    [facility]  Date:10/19/2023    Subjective: Pt stated he is doing well does not have increased pain    Objective: no ecchymosis, deformity swelling present.      Josue completed therapeutic exercises to develop strength    Exercise Reps/Sets/Time Weight #   Nerve glides 2x10    Prone hamstring curls 3x10 5lbs   Single leg bridge 2x10    Lat walks at ankles 3x20    Backward lunge 3x10    Lateral Lunge 3x10    Bridge with physio ball 3x10    [place] hamstring falls 3x10    90/90 2x10 5" hold    Hamstring stretch 3x30"    Calf Stretch 3x30"                  Josue received the following  modalities and/or manual therapy techniques:      Modality/Manual Therapy Time   IASTM 5mins                                SHAZIA Viveros, LAT, ATC    [facility]     [email or web address]    ---------------------------------------------------------------------------------------  ---------------------------------------------------------------------------------------  ---------------------------------------------------------------------------------------  ---------------------------------------------------------------------------------------    [facility] [place]  [facility]  Date:10/18/2023    Subjective: Pt stated he is doing well does not have increased pain    Objective: no ecchymosis, deformity swelling present.      Josue completed therapeutic exercises to develop strength    Exercise Reps/Sets/Time Weight #   Nerve glides 2x10    Prone hamstring curls 3x10 5lbs   Single leg bridge 2x10    Lat walks at ankles 3x20    Backward lunge 3x10    Lateral Lunge 3x10    Bridge with physio ball 3x10    [place] hamstring falls 3x10    90/90 2x10 5" hold    Hamstring stretch 3x30"    Calf Stretch 3x30"                  Josue received the following  modalities and/or manual therapy techniques:      Modality/Manual Therapy Time   Estim pre mod hamstrings 15mins                                Anna Blanchard, SHAZIA, LAT, ATC    [facility]     [email or web address]   [email or web address]    ---------------------------------------------------------------------------------------  ---------------------------------------------------------------------------------------    [facility]  Date:10/17/2023    Subjective: Pt stated he is doing well does not have increased pain    Objective: no ecchymosis, deformity swelling present.    Josue completed therapeutic exercises to develop strength      Exercise Reps/Sets/Time Weight #   Hamstring stretch  3x30"    Quad stretch 3x30"    Kneeling Hip flexor stretch " "3x30"    Adductor stretch 3x30"    90/90 3x5x5"    Hip Cars Flexion 2x5    Hip Cars Extension 2x5    Nerve glides 2x10                                 Josue received the following  modalities and/or manual therapy techniques:      Modality/Manual Therapy Time   None                                 SHAZIA Viveros, LAT, ATC    [facility]     [email or web address]   [email or web address]    ---------------------------------------------------------------------------------------  ---------------------------------------------------------------------------------------    [facility]  Date:10/16/2023    Subjective: Pt stated he is doing well       Objective: no ecchymosis, deformity swelling present.      Josue completed therapeutic exercises to develop strength    Heat pack 10mins  Nerve glides 2x10  Bridge w/ band 3x15  Body squats 3x10  Lat walks 2x20  Forward/Lateral Step ups 2x10  Lateral Lunge  2x10  Backwards lunge 2x10  Hamstring curls 2x10 5lbs  Hamstring tantrums 3x30"  Ball roll out ins 3x10  Hamstring falls 3x10  Calf stretch 3x30"  PNF Stretches hamstring      Josue received the following  modalities and/or manual therapy techniques:      Modality/Manual Therapy Time   Hamstring cupping 10mins                                SHAZIA Viveros, LAT, ATC    [facility]     [email or web address]   [email or web address]    ---------------------------------------------------------------------------------------  ---------------------------------------------------------------------------------------             "

## 2023-10-23 ENCOUNTER — ATHLETIC TRAINING SESSION (OUTPATIENT)
Dept: SPORTS MEDICINE | Facility: CLINIC | Age: 23
End: 2023-10-23
Payer: COMMERCIAL

## 2023-10-23 DIAGNOSIS — S76.309D HAMSTRING INJURY, SUBSEQUENT ENCOUNTER: Primary | ICD-10-CM

## 2023-10-23 NOTE — PROGRESS NOTES
"Subjective:       Chief Complaint: Josue Campos is a 23 y.o. male student at [employer] who had concerns including R Hamstring Strain and R Tibia pain.    Handedness: right-handed  Sport played: basketball      Level: college              Review of Systems   All other systems reviewed and are negative.          Objective:       General: Josue is well-developed, well-nourished, appears stated age, in no acute distress, alert and oriented to time, place and person.     AT Session          Assessment:     Status: AT - Cleared to Exert    Date Seen:  10/23/2023-10/27/2023    Date of Injury:  Right 9/26/2023 Left 10/24/2023    Date Out:  Left 10/26/2023      Date Cleared:  10/28/2023 for activity      Plan:   ---------------------------------------------------------------------------------------  ---------------------------------------------------------------------------------------    [facility]  Date:10/29/2023    Subjective: No increased pain only soreness.  Pt started half court contact activity     Objective: Started half court contact activity      Josue completed therapeutic exercises to develop strength     Exercise Reps/Sets/Time Weight #   Nerve glides 2x10    Prone hamstring curls 3x10 5lbs   Single leg bridge 2x10    Lat walks at ankles 3x20    Backward lunge 3x10    Lateral Lunge 3x10    Bridge with physio ball 3x10    Nordic hamstring falls 3x10    90/90 2x10 5" hold    Hamstring stretch 3x30"    Calf Stretch 3x30"                  Josue received the following  modalities and/or manual therapy techniques:      Modality/Manual Therapy Time   IASTM hamstrings bilateral 5mins   Cupping low back 10mins                            Anna Blanchard MSAT, LAT, ATC    [facility] [place]    [facility]     [email or web address] " [email or web address]    ---------------------------------------------------------------------------------------  ---------------------------------------------------------------------------------------   ---------------------------------------------------------------------------------------  ---------------------------------------------------------------------------------------    [facility] [place]  [facility]  Date:10/28/2023    Subjective: Pt stated he feels back to how he felt prior to the pain in his left hamstring and stated he feels like his pain switches from R to L hamstring      Josue completed therapeutic exercises to develop strength     Exercise Reps/Sets/Time Weight #   Nerve glides 2x10    Prone hamstring curls 3x10 5lbs   Single leg bridge 2x10    Lat walks at ankles 3x20    Backward lunge 3x10    Lateral Lunge 3x10    Bridge with physio ball 3x10    Nordic hamstring falls 3x10    90/90 2x10 5" hold    Hamstring stretch 3x30"    Calf Stretch 3x30"                  Braelee received the following  modalities and/or manual therapy techniques:      Modality/Manual Therapy Time   Hamstrings Estim Pre mod 15mins                                SHAZIA Viveros, LAT, ATC    [facility] [employer] [facility]     [email or web address]   [email or web address]       Athletic Training Notes     Name: Josue Campos  Clinic Number: [id number]  Sport: Men's Basketball        Notes     10/27/2023  Rest day no rehab  Pt stated he received shock wave therapy, got in  in the cold tub and the sauna and felt good    10/26/2023   Complete rest day no rehab or treatment      YO Bailey, ATC    [facility] [employer] [facility] " "      ---------------------------------------------------------------------------------------  ---------------------------------------------------------------------------------------    [facility]  Date:10/25/2023    Subjective: Pt stated he was feeling sore this mor  Rehab completed for R leg only    Objective: Left hamstring palpably tight not more than than right      Josue completed therapeutic exercises to develop strength     Exercise Reps/Sets/Time Weight #   Nerve glides 2x10    Prone hamstring curls 3x10 5lbs   Single leg bridge 2x10    Lat walks at ankles 3x20    Backward lunge 3x10    Lateral Lunge 3x10    Bridge with physio ball 3x10    Nordic hamstring falls 3x10    90/90 2x10 5" hold    Hamstring stretch 3x30"    Calf Stretch 3x30"                  Josue received the following  modalities and/or manual therapy techniques:      Modality/Manual Therapy Time   none                                 Anna Blanchard, MUMTAZT, LAT, ATC    [facility] [employer] [facility]     [email or web address]   [email or web address]    ---------------------------------------------------------------------------------------  ---------------------------------------------------------------------------------------   ---------------------------------------------------------------------------------------  ---------------------------------------------------------------------------------------    [facility]  Date:10/24/2023    Subjective: Pt stated he felt like he pulled his left hamstring because when he woke up this morning he was in a lot of pain but cannot remember any specific movements during practice the day before that caused pain.   Rehab completed for R leg only    Objective: Left hamstring palpably tight not more than than " "right      Josue completed therapeutic exercises to develop strength     Exercise Reps/Sets/Time Weight #   Nerve glides 2x10    Prone hamstring curls 3x10 5lbs   Single leg bridge 2x10    Lat walks at ankles 3x20    Backward lunge 3x10    Lateral Lunge 3x10    Bridge with physio ball 3x10    Nordic hamstring falls 3x10    90/90 2x10 5" hold    Hamstring stretch 3x30"    Calf Stretch 3x30"                  Josue received the following  modalities and/or manual therapy techniques:      Modality/Manual Therapy Time   Hamstring Estim Pre mod 15mins                                Anna Blanchard, MUMTAZT, LAT, ATC    [facility] [place]    [facility]     [email or web address]   [email or web address]    ---------------------------------------------------------------------------------------  ---------------------------------------------------------------------------------------   ---------------------------------------------------------------------------------------  ---------------------------------------------------------------------------------------    [facility]  Date:10/23/2023    Subjective: Pt stated he is doing well but is sore    Jadae completed therapeutic exercises to develop strength    Exercise Reps/Sets/Time Weight #   Nerve glides 2x10    Prone hamstring curls 3x10 5lbs   Single leg bridge 2x10    Lat walks at ankles 3x20    Backward lunge 3x10    Lateral Lunge 3x10    Bridge with physio ball 3x10    Nordic hamstring falls 3x10    90/90 2x10 5" hold    Hamstring stretch 3x30"    Calf Stretch 3x30"                  Josue received the following  modalities and/or manual therapy techniques:      Modality/Manual Therapy Time   IASTM R tibia 3mins                                SHAZIA Viveros, LAT, ATC    [facility] " [email or web address]   [email or web address]    ---------------------------------------------------------------------------------------  ---------------------------------------------------------------------------------------

## 2023-10-30 ENCOUNTER — ATHLETIC TRAINING SESSION (OUTPATIENT)
Dept: SPORTS MEDICINE | Facility: CLINIC | Age: 23
End: 2023-10-30
Payer: COMMERCIAL

## 2023-10-30 DIAGNOSIS — S76.312D STRAIN OF LEFT HAMSTRING MUSCLE, SUBSEQUENT ENCOUNTER: ICD-10-CM

## 2023-10-30 DIAGNOSIS — S76.311D STRAIN OF RIGHT HAMSTRING, SUBSEQUENT ENCOUNTER: Primary | ICD-10-CM

## 2023-10-30 NOTE — PROGRESS NOTES
"Subjective:       Chief Complaint: Josue Campos is a 23 y.o. male student at [employer] who had concerns including Hamstring Pain L&R.    Handedness: right-handed  Sport played: basketball      Level: college                Review of Systems   All other systems reviewed and are negative.          Objective:       General: Josue is well-developed, well-nourished, appears stated age, in no acute distress, alert and oriented to time, place and person.     AT Session          Assessment:     Status: AT - Cleared to Exert    Date Seen:  10/30/2023-11/3/2023    Date of Injury:  Right 9/26/2023 Left 10/24/2023    Date Out:  N/A      Date Cleared:  10/28/2023 for activity      Plan:   ---------------------------------------------------------------------------------------  ---------------------------------------------------------------------------------------     [facility]  Date:11/3/2023    Subjective: Pt stated he is feeling well only salvador Salas completed therapeutic exercises to develop strength     Exercise Reps/Sets/Time Weight #   Nerve glides 2x10    Prone hamstring curls 3x10 5lbs   Single leg bridge 2x10    Lat walks at ankles 3x20    Backward lunge 3x10    Lateral Lunge 3x10    Hamstring stretch 3x30"    Calf Stretch 3x30"                  Josue received the following  modalities and/or manual therapy techniques:      Modality/Manual Therapy Time   Hamstrings Estim Pre mod 15mins                                Anna Blanchard, MUMTAZT, LAT, ATC    [facility]     [email or web address]      ---------------------------------------------------------------------------------------  ---------------------------------------------------------------------------------------    [facility]" "Medicine  Date:11/1/2023    Subjective: Pt stated he is dong well only sore but no increased pain in hamstrings      Josue completed therapeutic exercises to develop strength     Exercise Reps/Sets/Time Weight #   Nerve glides 2x10    Prone hamstring curls 3x10 5lbs   Single leg bridge 2x10    Lat walks at ankles 3x20    Backward lunge 3x10    Lateral Lunge 3x10    90/90 2x10 5" hold    Hamstring stretch 3x30"    Calf Stretch 3x30"                  Josue received the following  modalities and/or manual therapy techniques:      Modality/Manual Therapy Time   none                                 Anna Blanchard, SHAZIA, LAT, ATC    [facility]     [email or web address]   [email or web address]    ---------------------------------------------------------------------------------------  ---------------------------------------------------------------------------------------       [facility]  Date:10/31/2023    Subjective: Pt stated is doing well just very sore    Josue completed therapeutic exercises to develop strength     Exercise Reps/Sets/Time Weight #   Nerve glides 2x10    Prone hamstring curls 3x10 5lbs   Single leg bridge 2x10    Lat walks at ankles 3x20    Backward lunge 3x10    Lateral Lunge 3x10    90/90 2x10 5" hold    Hamstring stretch 3x30"    Calf Stretch 3x30"                  Josue received the following  modalities and/or manual therapy techniques:      Modality/Manual Therapy Time   None                                 SHAZIA Viveros, LAT, ATC    [facility]     [email or web address]   [email or web address]    ---------------------------------------------------------------------------------------  --------------------------------------------------------------------------------------- " "      [facility]  Date:10/30/2023    Subjective: Pt stated he is doing well but is sore    Jadae completed therapeutic exercises to develop strength    Exercise Reps/Sets/Time Weight #   Nerve glides 2x10    Prone hamstring curls 3x10 5lbs   Single leg bridge 2x10    Lat walks at ankles 3x20    Backward lunge 3x10    Lateral Lunge 3x10    90/90 2x10 5" hold    Hamstring stretch 3x30"    Calf Stretch 3x30"                  Josue received the following  modalities and/or manual therapy techniques:      Modality/Manual Therapy Time   Compex muscle recovery hamstrings 15mins                                Anna Blanchard, MSAT, LAT, ATC    [facility]     [email or web address]   [email or web address]    ---------------------------------------------------------------------------------------  ---------------------------------------------------------------------------------------         "

## 2023-11-08 ENCOUNTER — ATHLETIC TRAINING SESSION (OUTPATIENT)
Dept: SPORTS MEDICINE | Facility: CLINIC | Age: 23
End: 2023-11-08
Payer: COMMERCIAL

## 2023-11-08 DIAGNOSIS — S76.311D STRAIN OF RIGHT HAMSTRING, SUBSEQUENT ENCOUNTER: Primary | ICD-10-CM

## 2023-11-08 DIAGNOSIS — S76.312D STRAIN OF LEFT HAMSTRING MUSCLE, SUBSEQUENT ENCOUNTER: ICD-10-CM

## 2023-11-08 NOTE — PROGRESS NOTES
"Subjective:       Chief Complaint: Josue Campos is a 23 y.o. male student at [employer] who had concerns including Hamstring Strains  (Bilateral).    Handedness: right-handed  Sport played: basketball      Level: college                Review of Systems   All other systems reviewed and are negative.                Objective:       General: Josue is well-developed, well-nourished, appears stated age, in no acute distress, alert and oriented to time, place and person.     AT Session          Assessment:     Status: F - Full Participation    Date Seen:  11/6/2023-11/9/2023    Date of Injury: Right 9/26/2023 Left 10/24/2023    Date Out:  N/A    Date Cleared:  N/A      Plan:     [facility]  Date:11/9/2023    Subjective: Pt stated his hamstring is doing better but still has minimal discomfort and tightness    Josue completed therapeutic exercises to develop strength     Exercise Reps/Sets/Time Weight #   Nerve glides 2x10    Prone hamstring curls 3x10 5lbs   Single leg bridge 2x10    Lat walks at ankles 3x20    Backward disc slide 3x10    90/90 2x10 5" hold    Hamstring stretch 3x30"    Calf Stretch 3x30"                  Josue received the following  modalities and/or manual therapy techniques:      Modality/Manual Therapy Time   Compex muscle recovery 15mins   Massage gun 3mins                            SHAZIA Viveros, LAT, ATC    [facility]     [email or web address]   [email or web address]    ---------------------------------------------------------------------------------------  ---------------------------------------------------------------------------------------       [facility] [place]  [facility]  Date:11/8/2023    Subjective: Pt stated he is doing well but feels very tight in medial hamstring below " "gerber Salas completed therapeutic exercises to develop strength    Exercise Reps/Sets/Time Weight #   Nerve glides 2x10    Prone hamstring curls 3x10 5lbs   Single leg bridge 2x10    Lat walks at ankles 3x20    Backwards disc slide 3x10    90/90 2x10 5" hold    Hamstring stretch 3x30"    Calf Stretch 3x30"                  Josue received the following  modalities and/or manual therapy techniques:      Modality/Manual Therapy Time   R hamstring IASTM 3mins   Massage gun 3mins   Compex muscle recovery 15mins                        Anna Blanchard, MUMTAZT, LAT, ATC    [facility] [place]    [facility]     [email or web address]   [email or web address]    ---------------------------------------------------------------------------------------  ---------------------------------------------------------------------------------------    11/7/2023   Pt stated he completed his rehab on his own  ---------------------------------------------------------------------------------------    11/6/2023  Pt stated he completed his rehab on his own    ---------------------------------------------------------------------------------------    "

## 2023-11-15 ENCOUNTER — ATHLETIC TRAINING SESSION (OUTPATIENT)
Dept: SPORTS MEDICINE | Facility: CLINIC | Age: 23
End: 2023-11-15
Payer: COMMERCIAL

## 2023-11-15 DIAGNOSIS — M25.572 ACUTE LEFT ANKLE PAIN: ICD-10-CM

## 2023-11-15 DIAGNOSIS — S76.312D STRAIN OF LEFT HAMSTRING MUSCLE, SUBSEQUENT ENCOUNTER: ICD-10-CM

## 2023-11-15 DIAGNOSIS — S76.311D STRAIN OF RIGHT HAMSTRING, SUBSEQUENT ENCOUNTER: Primary | ICD-10-CM

## 2023-11-15 NOTE — PROGRESS NOTES
"Subjective:       Chief Complaint: Josue Campso is a 23 y.o. male student at [employer] who had concerns including Hamstring Maintenance .    Handedness: right-handed  Sport played: basketball      Level: college                Review of Systems   All other systems reviewed and are negative.                Objective:       General: Josue is well-developed, well-nourished, appears stated age, in no acute distress, alert and oriented to time, place and person.     AT Session          Assessment:     Status: AT - Cleared to Exert    Date Seen:  11/14/23-11/17/23    Date of Injury: Hamstring Right 9/26/2023 Hamstring Left 10/24/2023  L Ankle: 11/16/2023    Date Out:  N/A    Date Cleared:  N/A      Plan:     ---------------------------------------------------------------------------------------  ---------------------------------------------------------------------------------------    [facility]  Date:11/17/2023    Subjective: Pt stated his pain was 4.5/10 but feels better with activity when taped. Pt stated his mom sent him an antiinflammatory for the pain. (Diclofenac Sodium 75mg prescribed by his physician at home)    Objective: Swelling present, no deformity or ecchymosis present      Josue completed therapeutic exercises to develop strength       Exercise Reps/Sets/Time Weight #   Ankle pumps  x30    Towel scrunches   3x10    Towel pulls 3x8    Toe yoga x20    D1/D2 3x10    Baps board 3x3    Calf stretch w/ towel 3x30"                                      Josue received the following  modalities and/or manual therapy techniques:      Modality/Manual Therapy Time   Moving Cupping ankle to decrease swelling 10mins                                Anna Blanchard MSAT, LAT, ATC    [facility] [place]    [facility]     [email or web address] " "  [email or web address]    ---------------------------------------------------------------------------------------  ---------------------------------------------------------------------------------------   ---------------------------------------------------------------------------------------  ---------------------------------------------------------------------------------------    [facility]  Date:11/16/2023    Subjective: Pt stated his pain was 4.5/10     Objective: Swelling present, no deformity or ecchymosis present      Josue completed therapeutic exercises to develop strength       Exercise Reps/Sets/Time Weight #   Ankle pumps  x30    Ankle stretches w/ towel 3x30"                                                               Josue received the following  modalities and/or manual therapy techniques:      Modality/Manual Therapy Time   Lymphatic massage 10mins                                Anna Blanchard, MUMTAZT, LAT, ATC    [facility]     [email or web address]    ---------------------------------------------------------------------------------------  ---------------------------------------------------------------------------------------       [facility] [place]  [facility]  Date:11/14/2023    Subjective: Pt stated his hamstring is doing better but still has minimal discomfort and tightness    [email or web address] completed therapeutic exercises to develop strength     Exercise Reps/Sets/Time Weight #   Nerve glides 2x10    Prone hamstring curls 3x10 5lbs   Single leg bridge 2x10    Lat walks at ankles 3x20    Backward disc slide 3x10    90/90 2x10 5" hold    Hamstring stretch 3x30"    Calf Stretch 3x30"                  Josue received the following  modalities and/or manual therapy " techniques:      Modality/Manual Therapy Time   Cupping Hamstrings 10mins                                Anna Blanchard, SHAZIA, LAT, ATC    [facility]     [email or web address]   [email or web address]    ---------------------------------------------------------------------------------------  ---------------------------------------------------------------------------------------

## 2023-11-16 ENCOUNTER — ATHLETIC TRAINING SESSION (OUTPATIENT)
Dept: SPORTS MEDICINE | Facility: CLINIC | Age: 23
End: 2023-11-16
Payer: COMMERCIAL

## 2023-11-16 DIAGNOSIS — M25.572 ACUTE LEFT ANKLE PAIN: Primary | ICD-10-CM

## 2023-11-16 NOTE — PROGRESS NOTES
Subjective:       Chief Complaint: Josue Campos is a 23 y.o. male student at [employer] who had no chief complaint listed for this encounter.    HPI    ROS              Objective:       General: Josue is well-developed, well-nourished, appears stated age, in no acute distress, alert and oriented to time, place and person.     AT Session          Assessment:     Status: {AMB AT Status:05652034}    Date Seen: {AMB AT Date Seen:44400358}    Date of Injury: {AMB AT DATE OF INJURY:21034684}    Date Out: {AMB AT DATE OUT:21034685}    Date Cleared: {AMB AT DATE CLEARED:21034686}      Plan:       1. ***  2. Physician Referral: {YES/NO:20292}  3. ED Referral: {YES/NO:20292}  4. Parent/Guardian Notified: {Athlete Parent Communication:81026}  5. All questions were answered, ath. will contact me for questions or concerns in  the interim.  6.         Eligible to use School Insurance: { SCHOOL INSURANCE:50988}

## 2023-11-16 NOTE — PROGRESS NOTES
Subjective:       Chief Complaint: Josue Campos is a 23 y.o. male student at [employer] who had concerns including Injury of the Left Ankle.    Pt stated he was practicing and teammate fell on his left ankle causing pain. PMH has had multiple ankle sprains in the past on right and left ankle. Pn scale 4.5/10 no numbness or tingling present     Handedness: right-handed  Sport played: basketball      Level: college            Injury  This is a new problem. The current episode started today. The problem occurs intermittently. The problem has been waxing and waning. The symptoms are aggravated by walking. He has tried NSAIDs for the symptoms. The treatment provided mild relief.       Review of Systems   All other systems reviewed and are negative.                Objective:       General: Josue is well-developed, well-nourished, appears stated age, in no acute distress, alert and oriented to time, place and person.             Right Ankle/Foot Exam   Right ankle exam is normal.    Left Ankle/Foot Exam     Inspection  Bruising:  Foot - absent  Effusion:  Foot - present  Atrophy:  Foot - absent    Pain   The patient exhibits pain of the anterior talofibular ligament, calcaneofibular ligament, lateral malleolus and medial malleolus.    Swelling   The patient is swollen on the anterior talofibular ligament and lateral malleolus.    Range of Motion   Ankle Joint  Dorsiflexion:  normal Left ankle dorsiflexion: pain present.  Plantar flexion:  normal Left ankle plantar flexion: pain present.    Subtalar Joint   Inversion:  normal Left ankle inversion: pain present.  Eversion:  normal Left ankle eversion: pain present.    Tests   Anterior drawer: positive (positive for pain no laxity present)  Varus tilt: positive (positive for pain no laxity present)  Heel Walk: able to perform  Tiptoe Walk: able to perform  Single Heel Rise: able to perform  External Rotation Test: negative  Squeeze Test: absent    Other   Ankle Crepitus:  present  Sensation: normal      Muscle Strength   Left Lower Extremity   Anterior tibial:  5/5 (pain present)   Posterior tibial:  5/5 (pain present)   Left gastrocnemius-soleus strength: pain present.   Left peroneal muscle strength: pain present.             Assessment:     Status: AT - Cleared to Exert    Date Seen:  11/16/2023    Date of Injury:  11/16/2023    Date Out:  N/A    Date Cleared:  N/A      Plan:       1. Pt was treated with lymphatic drainage massage to decrease swelling. Pt informed to continue ankle pumps and calf stretches to prevent decreased mobility.  2. Physician Referral: no  3. ED Referral: no  4. Parent/Guardian Notified: No  5. All questions were answered, ath. will contact me for questions or concerns in  the interim.  6.         Eligible to use School Insurance: Yes

## 2023-11-27 ENCOUNTER — ATHLETIC TRAINING SESSION (OUTPATIENT)
Dept: SPORTS MEDICINE | Facility: CLINIC | Age: 23
End: 2023-11-27
Payer: COMMERCIAL

## 2023-11-27 DIAGNOSIS — M79.674 GREAT TOE PAIN, RIGHT: Primary | ICD-10-CM

## 2023-11-27 NOTE — PROGRESS NOTES
Subjective:       Chief Complaint: Josue Campos is a 23 y.o. male student at [employer] who had concerns including Pain of the Right Foot.    On 11/26/2023 Pt stated he has had pain in his R big toe since the first game he played this season but it began to get worse during the first game last week on Monday 11/20/2023. PMH pt stated he has had pain like this in the past and was just taped for it. Pn scale 4.5/10.     Handedness: right-handed  Sport played: basketball      Level: college            Pain  The current episode started 1 to 4 weeks ago. The problem occurs daily. The problem has been gradually worsening. Exacerbated by: running and pushing off toes. He has tried NSAIDs and ice for the symptoms. The treatment provided mild relief.       Review of Systems   All other systems reviewed and are negative.                Objective:       General: Josue is well-developed, well-nourished, appears stated age, in no acute distress, alert and oriented to time, place and person.             Right Ankle/Foot Exam     Inspection   Effusion:  Foot - present    Swelling   The patient is swollen on the great toe metatarsophalangeal joint.    Tenderness   The patient is tender to palpation of the great toe metatarsophalangeal joint.    Pain   The patient exhibits pain of the great toe metatarsophalangeal joint.    Range of Motion   Ankle Joint   Dorsiflexion:  normal   Plantar flexion:  normal   Subtalar Joint   Inversion:  normal   Eversion:  normal   First MTP Joint: limited    Tests   Tiptoe Walk: able to perform    Other   Sensation: normal    Left Ankle/Foot Exam   Left ankle exam is normal.      Muscle Strength   Right Lower Extremity   Anterior tibial:  5/5   Posterior tibial:  5/5   Gastrocsoleus:  5/5   Peroneal muscle:  5/5   FDL: 5/5  EDL: 5/5  FHL: 5/5  Left Lower Extremity   EHL:  5/5            Assessment:     Status: AT - Cleared to Exert    Date Seen:  11/26/2023    Date of Injury:  11/20/2023    Date Out:   N/A    Date Cleared:  N/A      Plan:       1. Pt was taped to decrease toe flexion and extension and treated with ultrasound at 50% 3mhz .8 intensity for 5mins  2. Physician Referral: no  3. ED Referral: no  4. Parent/Guardian Notified: No  5. All questions were answered, ath. will contact me for questions or concerns in  the interim.  6.         Eligible to use School Insurance: Yes

## 2023-11-27 NOTE — PROGRESS NOTES
Subjective:       Chief Complaint: Josue Campos is a 23 y.o. male student at [employer] who had concerns including Pain of the Right Foot.    Handedness: right-handed  Sport played: basketball      Level: college            Pain        Review of Systems   All other systems reviewed and are negative.              Objective:       General: Josue is well-developed, well-nourished, appears stated age, in no acute distress, alert and oriented to time, place and person.     AT Session          Assessment:     Status: AT - Cleared to Exert    Date Seen:  11/25-12/1/2023    Date of Injury:  11/20/2023    Date Out:  N/A    Date Cleared:  N/A      Plan:   ---------------------------------------------------------------------------------------  ---------------------------------------------------------------------------------------    [facility]  Date:11/28/2023    Subjective: Pt stated he is doing better but still has pain but is able to practice w/ minimal pain.    Objective: Swelling present,  no ecchymosis or deformity present.      Josue completed therapeutic exercises to develop strength and ROM       Exercise Reps/Sets/Time Weight #   Hell raises (sitting) 3x12    Toe spreads 1x20    Towel scrunches 3x10    Short Foot x20                                                     Josue received the following  modalities and/or manual therapy techniques:      Modality/Manual Therapy Time   Ultrasound 3mhz .8 intensity 50%  w/ Voltaren gel 5mins                                Anna Blanchard, MUMTAZT, LAT, ATC    [facility]     [email or web address]   [email or web address]      ---------------------------------------------------------------------------------------------------------------------------------------------------------------------------------------------------------[facility]  Date:11/26/2023    Subjective: Pt stated he is on significant pain but taping helps 4.5/10 pt stated the ultrasound helps decrease pt pain.    Objective: Swelling present,  no ecchymosis or deformity present.      Josue received the following  modalities and/or manual therapy techniques:      Modality/Manual Therapy Time   Ultrasound 3mhz .8 intensity 50%  w/ Voltaren gel 5mins                                SHAZIA Viveros, LAT, ATC    [facility]     [email or web address]   [email or web address]    ---------------------------------------------------------------------------------------  ---------------------------------------------------------------------------------------       [facility]  Date:11/25/2023    Subjective: Pt stated he is on significant pain but taping helps 4.5/10    Objective: Swelling present,  no ecchymosis or deformity present.      Josue completed therapeutic exercises to develop strength and ROM       Exercise Reps/Sets/Time Weight #   Hell raises (sitting) 3x12    Toe spreads 1x20    Towel scrunches 3x10    Short Foot x20                                                     Josue received the following  modalities and/or manual therapy techniques:      Modality/Manual Therapy Time   Ultrasound 3mhz .8 intensity 50%  w/ Voltaren gel 5mins   IASTM arch foot 3mins                            SHAZIA Viveros, LAT, ATC    [facility]     [email or web address]    ---------------------------------------------------------------------------------------  ---------------------------------------------------------------------------------------

## 2023-12-04 ENCOUNTER — ATHLETIC TRAINING SESSION (OUTPATIENT)
Dept: SPORTS MEDICINE | Facility: CLINIC | Age: 23
End: 2023-12-04
Payer: COMMERCIAL

## 2023-12-04 DIAGNOSIS — M25.572 ACUTE LEFT ANKLE PAIN: ICD-10-CM

## 2023-12-04 DIAGNOSIS — M79.674 GREAT TOE PAIN, RIGHT: ICD-10-CM

## 2023-12-04 NOTE — PROGRESS NOTES
Subjective:       Chief Complaint: Josue Campos is a 23 y.o. male student at [employer] who had no chief complaint listed for this encounter.    Handedness: right-handed  Sport played: basketball      Level: college            Review of Systems   All other systems reviewed and are negative.                Objective:       General: Josue is well-developed, well-nourished, appears stated age, in no acute distress, alert and oriented to time, place and person.     AT Session          Assessment:     Status: F - Full Participation    Date of Injury:  Reccurent ankle sprain 12/4/2023    Date Out:  N/A    Date Cleared:  N/A      Plan:     [facility]  Date:12/5/2023    Subjective: Pt stated he rolled his L ankle again during practice and is very sore all around his ankle.    Turf Toe: Pt stated he is doing okay has minimal toe pain but does not want to be taped for practice/games    Objective: No swelling present. Crepitus present in lateral ankle      Josue received the following  modalities and/or manual therapy techniques:      Modality/Manual Therapy Time   Moving cups lymphatic drainage  5mins                                Anna lBanchard, SHAZIA, LAT, ATC    [facility]     [email or web address]   [email or web address]    ---------------------------------------------------------------------------------------  ---------------------------------------------------------------------------------------       [facility]  Date:12/4/2023    Subjective: Pt stated he rolled his L ankle again during practice and is very sore all around his ankle.    Turf Toe: Pt stated he is doing okay has minimal toe pain but does not want to be taped for practice/games    Objective: No swelling present. Crepitus present in lateral  "ankle    Comments: joint mobs attempted to decrease stiffness but pt was in too much pain could not complete      Braflakita received the following  modalities and/or manual therapy techniques:      Modality/Manual Therapy Time   Calf stretch w/ towel 3x30"                                SHAZIA Viveros, LAT, ATC    [facility]     [email or web address]   [email or web address]    ---------------------------------------------------------------------------------------  ---------------------------------------------------------------------------------------     "

## 2023-12-11 ENCOUNTER — ATHLETIC TRAINING SESSION (OUTPATIENT)
Dept: SPORTS MEDICINE | Facility: CLINIC | Age: 23
End: 2023-12-11

## 2023-12-11 DIAGNOSIS — M25.572 ACUTE LEFT ANKLE PAIN: ICD-10-CM

## 2023-12-11 NOTE — PROGRESS NOTES
Subjective:       Chief Complaint: Josue Campos is a 23 y.o. male student at [employer] who had concerns including Pain of the Left Ankle.    Handedness: right-handed  Sport played: basketball      Level: college            Pain  This is a recurrent problem.     Review of Systems   All other systems reviewed and are negative.                Objective:       General: Josue is well-developed, well-nourished, appears stated age, in no acute distress, alert and oriented to time, place and person.             Right Ankle/Foot Exam   Right ankle exam is normal.    Left Ankle/Foot Exam     Inspection  Deformity: absent  Bruising:  Foot - absent    Pain   The patient exhibits pain of the medial malleolus.    Swelling   The patient is swollen on the lateral malleolus and medial malleolus.    Tenderness   The patient is tender to palpation of the medial malleolus.    Range of Motion   The patient has normal left ankle ROM.   Ankle Joint  Left ankle dorsiflexion: minimal pain present.     Subtalar Joint   Left ankle inversion: minimal pain present.   Left ankle eversion: minimal pain present.     Muscle Strength   The patient has normal left ankle strength.    Tests   Anterior drawer: negative  Varus tilt: negative  Heel Walk: able to perform  Tiptoe Walk: able to perform  Single Heel Rise: able to perform  External Rotation Test: negative  Squeeze Test: absent    Other   Sensation: normal  Peroneal Subluxation: negative              Assessment:     Status: As Tolerated     Date Seen:  12/11/23    Date of Injury:  Reccurent Left ankle sprain 12/9/2023    Date Out:  N/A    Date Cleared:  N/A      Plan:   ---------------------------------------------------------------------------------------  ---------------------------------------------------------------------------------------    [facility]  Date:12/11/2023    Subjective: Pt stated he is now having pain more  "frequently on his medial malleolus in his left ankle. Pt stated he had pain on the bone before but it has now increased.     Objective: Minimal swelling present. MMT: WNL minimal pain present AROM/PROM: WNL minimal pain present.     Comments: Pt reassured that he did not test positive for a fracture and with his history of multiple ankle sprains align with his pain. Pt was instructed to inform ATC if pain increases. It was explained to pt the importance of doing his rehab daily to increase the strength in the muscle surrounding his ankle.       Josue completed therapeutic exercises to develop strength and endurance       Exercise Reps/Sets/Time Weight #   Ankle ABC's x3    4 way ankle 3x12    SL balance 3x45"    Cone pick ups 3x5    3 way calf raises 3x10                                                Josue received the following  modalities and/or manual therapy techniques:      Modality/Manual Therapy Time   Estim premod 15mins                                SHAZIA Viveros, LAT, ATC    [facility]    [facility]     [email or web address]   [email or web address]    ---------------------------------------------------------------------------------------  ---------------------------------------------------------------------------------------             "

## 2024-01-01 ENCOUNTER — ATHLETIC TRAINING SESSION (OUTPATIENT)
Dept: SPORTS MEDICINE | Facility: CLINIC | Age: 24
End: 2024-01-01
Payer: COMMERCIAL

## 2024-01-01 DIAGNOSIS — M79.661 RIGHT CALF PAIN: ICD-10-CM

## 2024-01-01 NOTE — PROGRESS NOTES
Subjective:       Chief Complaint: Josue Campos is a 23 y.o. male student at [employer] who had concerns including Injury and Pain of the Right Lower Leg.    Pt stated he was kicked in the right calf during the game on 2023. Pt stated pain stayed the same yesterday but it still hurts to walk and is very tender to touch. Pt stated today his foot feels a bit numb when siting but once he walks he no longer can feel the numbness.      Handedness: right-handed  Sport played: basketball      Level: college            Injury  This is a new problem. The current episode started in the past 7 days. The problem occurs constantly. The problem has been unchanged. The symptoms are aggravated by walking (ankle dorsiflexion and plantar flexion). The treatment provided mild relief.   Pain        Review of Systems   All other systems reviewed and are negative.    Tenderness Present on Right upper medial calf            Objective:       General: Josue is well-developed, well-nourished, appears stated age, in no acute distress, alert and oriented to time, place and person.     General    Psychiatric: He has a normal mood and affect. His behavior is normal. Judgment and thought content normal.           AROM  Pain present w/all  Dorsi flexion: WNL  Plantar flexion: WNL  Inversion: WNL  Eversion: WNL     PROM:  Dorsi flexion: WNL  Plantar flexion: WNL  Inversion: WNL  Eversion: WNL    MMT:   Gastrocnemius : 4/5  Pain present  Soleus: 4/5   Pain Present  Hamstrin/5  Pain present    Special test:  Valdez test: negative   Squeeze test: pain present on muscle\  Tap test: (medial tibia) negative     Squat: able to perform   Double leg calf raise: able to perform w/ pain  Single leg calf raise: unable to perform on R leg        Assessment:     Status: O - Out    Date Seen:  2024    Date of Injury:  2023    Date Out:  2024    Date Cleared:  TBD      Plan:       1. Pt was treated with Estim Premod for 20mins to  decrease pain in his calf. Pt was referred to physician for further evaluation. Pt was instructed to utilize the cold whirlpool to decrease pain.    2. Physician Referral: Yes  3. ED Referral: no  4. Parent/Guardian Notified: No  5. All questions were answered, ath. will contact me for questions or concerns in  the interim.  6.         Eligible to use School Insurance: Yes

## 2024-01-02 ENCOUNTER — HOSPITAL ENCOUNTER (OUTPATIENT)
Dept: RADIOLOGY | Facility: HOSPITAL | Age: 24
Discharge: HOME | End: 2024-01-02
Attending: PHYSICIAN ASSISTANT
Payer: COMMERCIAL

## 2024-01-02 ENCOUNTER — ATHLETIC TRAINING SESSION (OUTPATIENT)
Dept: SPORTS MEDICINE | Facility: CLINIC | Age: 24
End: 2024-01-02
Payer: COMMERCIAL

## 2024-01-02 ENCOUNTER — OFFICE VISIT (OUTPATIENT)
Dept: SPORTS MEDICINE | Facility: CLINIC | Age: 24
End: 2024-01-02
Payer: COMMERCIAL

## 2024-01-02 VITALS
HEART RATE: 45 BPM | WEIGHT: 214.81 LBS | SYSTOLIC BLOOD PRESSURE: 129 MMHG | BODY MASS INDEX: 26.71 KG/M2 | DIASTOLIC BLOOD PRESSURE: 78 MMHG | HEIGHT: 75 IN

## 2024-01-02 DIAGNOSIS — M79.661 RIGHT CALF PAIN: ICD-10-CM

## 2024-01-02 DIAGNOSIS — M79.604 RIGHT LEG PAIN: ICD-10-CM

## 2024-01-02 DIAGNOSIS — T14.8XXA CONTUSION OF SOFT TISSUE: Primary | ICD-10-CM

## 2024-01-02 DIAGNOSIS — S89.90XA INJURY OF CALF: ICD-10-CM

## 2024-01-02 PROCEDURE — 1160F RVW MEDS BY RX/DR IN RCRD: CPT | Mod: CPTII,S$GLB,, | Performed by: PHYSICIAN ASSISTANT

## 2024-01-02 PROCEDURE — 73590 X-RAY EXAM OF LOWER LEG: CPT | Mod: TC,RT

## 2024-01-02 PROCEDURE — 3008F BODY MASS INDEX DOCD: CPT | Mod: CPTII,S$GLB,, | Performed by: PHYSICIAN ASSISTANT

## 2024-01-02 PROCEDURE — 3074F SYST BP LT 130 MM HG: CPT | Mod: CPTII,S$GLB,, | Performed by: PHYSICIAN ASSISTANT

## 2024-01-02 PROCEDURE — 3078F DIAST BP <80 MM HG: CPT | Mod: CPTII,S$GLB,, | Performed by: PHYSICIAN ASSISTANT

## 2024-01-02 PROCEDURE — 99999 PR PBB SHADOW E&M-EST. PATIENT-LVL III: CPT | Mod: PBBFAC,,, | Performed by: PHYSICIAN ASSISTANT

## 2024-01-02 PROCEDURE — 99214 OFFICE O/P EST MOD 30 MIN: CPT | Mod: S$GLB,,, | Performed by: PHYSICIAN ASSISTANT

## 2024-01-02 PROCEDURE — 73590 X-RAY EXAM OF LOWER LEG: CPT | Mod: 26,RT,, | Performed by: RADIOLOGY

## 2024-01-02 PROCEDURE — 1159F MED LIST DOCD IN RCRD: CPT | Mod: CPTII,S$GLB,, | Performed by: PHYSICIAN ASSISTANT

## 2024-01-02 RX ORDER — METHYLPREDNISOLONE 4 MG/1
TABLET ORAL
Qty: 21 EACH | Refills: 0 | Status: SHIPPED | OUTPATIENT
Start: 2024-01-02 | End: 2024-01-23

## 2024-01-02 NOTE — PROGRESS NOTES
Subjective:     Chief Complaint: Josue Campos is a 23 y.o. male who had concerns including Pain of the Right Leg.    Patient who is a [employer]  presents to clinic with right calf pain x 3 days. He states that he was playing in a game on 12/30/23 when another player's knee hit him in the proximal calf. He was unable to finish the game due to significant pain with ambulation. Pain at rest is 0/10. Pain at its worst is 6-7/10. He has been taking Tylenol as needed for pain. Pain increases with heel rise and walking. He noticed swelling in the posterior knee and proximal calf following the injury.        Review of Systems   Constitutional: Negative. Negative for chills, fever, weight gain and weight loss.   HENT:  Negative for congestion and sore throat.    Eyes:  Negative for blurred vision and double vision.   Cardiovascular:  Negative for chest pain, leg swelling and palpitations.   Respiratory:  Negative for cough and shortness of breath.    Hematologic/Lymphatic: Does not bruise/bleed easily.   Skin:  Negative for itching, poor wound healing and rash.   Musculoskeletal:  Negative for back pain, joint pain, joint swelling, muscle weakness, myalgias and stiffness.        Right calf pain     Gastrointestinal:  Negative for abdominal pain, constipation, diarrhea, nausea and vomiting.   Genitourinary: Negative.  Negative for frequency and hematuria.   Neurological:  Negative for dizziness, headaches, numbness, paresthesias and sensory change.   Psychiatric/Behavioral:  Negative for altered mental status and depression. The patient is not nervous/anxious.    Allergic/Immunologic: Negative for hives.       Pain Related Questions  Over the past 3 days, what was your average pain during activity? (I.e. running, jogging, walking, climbing stairs, getting dressed, ect.): 8  Over the past 3 days, what was your highest pain level?: 7  Over the past 3 days, what was your lowest pain level? : 2    Other  How many  nights a week are you awakened by your affected body part?: 3  Was the patient's HEIGHT measured or patient reported?: Patient Reported  Was the patient's WEIGHT measured or patient reported?: Measured    Objective:     General: Josue is well-developed, well-nourished, appears stated age, in no acute distress, alert and oriented to time, place and person.     General    Vitals reviewed.  Constitutional: He is oriented to person, place, and time. He appears well-developed and well-nourished. No distress.   Cardiovascular:  Normal rate and regular rhythm.            Pulmonary/Chest: Effort normal. No respiratory distress.   Neurological: He is alert and oriented to person, place, and time.   Psychiatric: He has a normal mood and affect. His behavior is normal. Thought content normal.     General Musculoskeletal Exam   Gait: antalgic and abnormal     Right Ankle/Foot Exam     Inspection   Scars: absent  Deformity: absent  Erythema: absent  Bruising: Ankle - absent Foot - absent  Effusion: Ankle - absent Foot - absent  Atrophy: Ankle - absent Foot - absent    Range of Motion   Ankle Joint   Dorsiflexion:  normal   Plantar flexion:  normal   Subtalar Joint   Inversion:  normal   Eversion:  normal   Valdez Test:  negative    Other   Sensation: normal    Comments:  Pain with single heel rise and tip toe walk      Right Knee Exam     Inspection   Erythema: absent  Scars: absent  Swelling: present (proximal calf)  Effusion: absent  Deformity: absent  Bruising: absent    Tenderness   Right knee tenderness location: proximal calf.    Muscle Strength   Right Lower Extremity   Anterior tibial:  4/5   Posterior tibial:  4/5   Gastrocsoleus:  4/5   Peroneal muscle:  4/5     RADIOGRAPHS:  Right tib/fib: 1/2/24  FINDINGS:  Healing stress fracture at the anterior aspect of the mid tibia identified as before.  No acute fracture or dislocation.  No bone destruction identified.      Assessment:     Encounter Diagnoses   Name  Primary?    Right leg pain Yes    Injury of calf         Plan:     Dr. Jaxson Gtz was consulted regarding this patient and treatment per his recommendations.    Diagnostic ultrasound was performed to rule out hematoma.  No hematoma present. Findings consistent with soft tissue contusion.    3. Medrol dose pack sent to pharmacy    4. Communicated with Noemi WHITING regarding return to play    5. Dr. Gtz will follow up with athlete tomorrow and discuss return to play based on symptoms. He will likely be out for the game tomorrow and could potentially be ready for Saturday.    6. I made the decision to obtain old records of the patient including previous notes and imaging. New imaging was ordered today of the extremity or extremities evaluated. I independently reviewed and interpreted the radiographs and/or MRIs today as well as prior imaging.        Patient questionnaires may have been collected.

## 2024-01-02 NOTE — PROGRESS NOTES
"Subjective:       Chief Complaint: Josue Campos is a 23 y.o. male student at [employer] who had concerns including R Calf Contusion.    Handedness: right-handed  Sport played: basketball      Level: college              Review of Systems   All other systems reviewed and are negative.                Objective:       General: Josue is well-developed, well-nourished, appears stated age, in no acute distress, alert and oriented to time, place and person.     AT Session          Assessment:     Status: O - Out    Date of Injury:  12/30/23    Date Out:  12/30/2023    Date Cleared:  TBD      Plan:     [facility]  Date:1/5/2024  Subjective: Pt stated he has noticed his R foot is going to sleep/numbness randomly throughout the day and when he runs. Pt stated his pain has significantly decreased but the numbness is still present.  Pt agreed to not practice today and be evaluated by Dr. Rosa Isela Gtz on 1/6/23 in the ATF.    Objective: No ecchymosis, swelling or deformity present. AROM Dorsiflexion plantar flexion: WNL   MMT: Gastroc: 5/5 with minimal pain Soleus:5/5 with minimal pain  Drop Foot test Hold Dorsiflexion MMT in leg extended: Negative  Drop Foot test: stair test: negative   Pt was able to complete test with no signs of drop foot.    Dermotome's: normal compared bilaterally  Capillary refill: normal  Pulse: normal    Josue completed therapeutic exercises to develop strength and ROM       Exercise Reps/Sets/Time Weight #   ABC's ankle x3 green   Calf Stretches 3 way (slant board) 3x30"             Josue received the following  modalities and/or manual therapy techniques:      Modality/Manual Therapy Time   Compex muscle recovery 15mins                            Anna Blanchard, MUMTAZT, LAT, ATC    [facility]     [email or web address] " [email or web address]    ---------------------------------------------------------------------------------------  ---------------------------------------------------------------------------------------   ---------------------------------------------------------------------------------------  ---------------------------------------------------------------------------------------    [facility] [place]  [facility]  Date:1/4/2024    Subjective: Pt stated he is doing a lot better but still has some pain. Pt completed full practice.     Objective: No ecchymosis, swelling or deformity present. AROM Dorsiflexion plantar flexion: WNL   MMT:   Gastroc: 5/5 with minimal pain  Soleus:5/5 with minimal pain    Josue completed therapeutic exercises to develop strength and ROM       Exercise Reps/Sets/Time Weight #   ABC's ankle x3 green   Calf Stretches 3 way (slant board) 3x30"             Josue received the following  modalities and/or manual therapy techniques:      Modality/Manual Therapy Time   Compex muscle recovery 15mins                            Anna Blanchard, SHAZIA, LAT, ATC    [facility]     [email or web address]   [email or web address]    ---------------------------------------------------------------------------------------  ---------------------------------------------------------------------------------------       ---------------------------------------------------------------------------------------  ---------------------------------------------------------------------------------------    [facility]  Date:1/4/2024    Subjective: Pt stated he is still having pain when walking but is doing significantly better    Objective: No ecchymosis, swelling or deformity present.       Jadabushra completed therapeutic exercises to " "develop strength and ROM       Exercise Reps/Sets/Time Weight #   ABC's ankle x3 green   Calf Stretches 3 way (slant board) 3x30"             Josue received the following  modalities and/or manual therapy techniques:      Modality/Manual Therapy Time   Compex muscle recovery 15mins                            Anna Blanchard, MUMTAZT, LAT, ATC    [facility]     [email or web address]   [email or web address]    ---------------------------------------------------------------------------------------  ---------------------------------------------------------------------------------------   ---------------------------------------------------------------------------------------  ---------------------------------------------------------------------------------------    [facility]  Date:1/3/2024    Subjective: Pt stated he is still having pain when walking but is doing significantly better    Objective: No ecchymosis, swelling or deformity present.       Josue completed therapeutic exercises to develop strength and ROM       Exercise Reps/Sets/Time Weight #   ABC's ankle x3 green   Calf Stretches w/ towel 3x30"             Josue received the following  modalities and/or manual therapy techniques:      Modality/Manual Therapy Time   Massage 5mins   Compex muscle recovery 15mins                            Anna Blanchard, MUMTAZT, LAT, ATC    [facility]     [email or web address]   [email or web address]    ---------------------------------------------------------------------------------------  --------------------------------------------------------------------------------------- " "      ---------------------------------------------------------------------------------------  ---------------------------------------------------------------------------------------    [facility]  Date:1/2/2024    Subjective: Pt stated he is feeling better than he was the last couple days. Pt was seen in clinic today for further evaluation    Objective: No ecchymosis, No Swelling, No deformity present.      Josue completed therapeutic exercises to develop flexibility       Exercise Reps/Sets/Time Weight #   Calf stretch w/ towel 3x30"        Josue received the following  modalities and/or manual therapy techniques:      Modality/Manual Therapy Time   Compex muscle recovery 15mins                                SHAZIA Viveros, LAT, ATC    [facility]     [email or web address]   [email or web address]    ---------------------------------------------------------------------------------------  ---------------------------------------------------------------------------------------                   "

## 2024-01-07 ENCOUNTER — TELEPHONE (OUTPATIENT)
Dept: REHABILITATION | Facility: OTHER | Age: 24
End: 2024-01-07
Payer: COMMERCIAL

## 2024-01-07 DIAGNOSIS — M79.661 PAIN IN RIGHT LOWER LEG: Primary | ICD-10-CM

## 2024-01-07 DIAGNOSIS — R20.2 RIGHT LEG PARESTHESIAS: ICD-10-CM

## 2024-01-07 DIAGNOSIS — Z87.312: ICD-10-CM

## 2024-01-08 NOTE — TELEPHONE ENCOUNTER
"Josue was evaluated in the TR on 1/6/24 for his persistent right lower leg pain. Pt. Was kneed in the medial, proximal lower leg in a game on 12/30, noting pain, tingling, and intermittent "cold sensation". These symptoms have improved, noting much less pain, but he still complains of a sensation of weakness on that side with intermittent "cold sensations".     On exam, no clear weakness or decreased sensation.NO ecchymosis or discoloration at the area of plain. Tissue soft, no palpable pocket of fluid in area of soft tissue tenderness. No bony tenderness. Single leg hop test was negative.  Intact distal pulses at rest and with plantar flexion. On MSK US, a small hypoechoic area was noted deep to the medial gastrocnemius, adjacent to the tibia, possible from a hematoma. NO clear gastrocnemius or soleus tearing.     Plan: MRI ordered for further evaluation given persistent symptoms and hx of chronic tibial stress fracture on that side. Plan to hold from basketball activity until results obtained.     Pt. And AT, Anna Blanchard, agreeable to plan and all questions were answered.   "

## 2024-01-09 ENCOUNTER — HOSPITAL ENCOUNTER (OUTPATIENT)
Dept: RADIOLOGY | Facility: HOSPITAL | Age: 24
Discharge: HOME | End: 2024-01-09
Attending: NEUROMUSCULOSKELETAL MEDICINE & OMM
Payer: COMMERCIAL

## 2024-01-09 ENCOUNTER — ATHLETIC TRAINING SESSION (OUTPATIENT)
Dept: SPORTS MEDICINE | Facility: CLINIC | Age: 24
End: 2024-01-09
Payer: COMMERCIAL

## 2024-01-09 DIAGNOSIS — Z87.312: ICD-10-CM

## 2024-01-09 DIAGNOSIS — M79.661 PAIN IN RIGHT LOWER LEG: ICD-10-CM

## 2024-01-09 DIAGNOSIS — R20.2 RIGHT LEG PARESTHESIAS: ICD-10-CM

## 2024-01-09 DIAGNOSIS — M79.661 RIGHT CALF PAIN: Primary | ICD-10-CM

## 2024-01-09 PROCEDURE — 73718 MRI LOWER EXTREMITY W/O DYE: CPT | Mod: TC,RT

## 2024-01-09 PROCEDURE — 73718 MRI LOWER EXTREMITY W/O DYE: CPT | Mod: 26,RT,, | Performed by: RADIOLOGY

## 2024-01-09 NOTE — PROGRESS NOTES
"Subjective:       Chief Complaint: Josue Campos is a 23 y.o. male student at [employer] who had concerns including Right Calf Pain.    Handedness: right-handed  Sport played: basketball      Level: college              Review of Systems   All other systems reviewed and are negative.                Objective:       General: Josue is well-developed, well-nourished, appears stated age, in no acute distress, alert and oriented to time, place and person.     AT Session          Assessment:     Status: F - Full Participation    Date Seen:  1/9/2024    Date of Injury:  12/30/2023    Date Out:  1/1/2024    Date Cleared:  1/9/2024      Plan:   [facility]  Date: 1/10/2024    Subjective: Pt stated he is doing better still has the numbness in right calf randomly throughout the day but pain has decreased significantly     Objective: No ecchymosis, swelling or deformity present      Josue completed therapeutic exercises to develop strength       Exercise Reps/Sets/Time Weight #   ABC's ankle  3x green   4 way ankle  3x15  green   Calf raises 3x15    Seated Calf Raises 3x10 5" hold 8 kg   Calf Stretch 3x30"                                                Josue received the following  modalities and/or manual therapy techniques:      Modality/Manual Therapy Time   Compex Muscle recovery  15mins                                SHAZIA Viveros, LAT, ATC    [facility]     [email or web address]   [email or web address]    ---------------------------------------------------------------------------------------  ---------------------------------------------------------------------------------------    [facility] [place]  [facility]  Date: 1/9/2024    Subjective:     Objective: No ecchymosis    ATC Comments: Pt was cleared for return to play by " "Dr. Rosa Isela Gtz. Dr. Rosa Isela Gtz evaluated pt MRI on 1/9/2023 for clearance. Pt practiced today and stated his leg felt odd with minimal pain but was able to complete full practice      Josue completed therapeutic exercises to develop strength       Exercise Reps/Sets/Time Weight #   ABC's ankle  3x    4 way ankle  3x15    3 way calf stretch 3x30"                                                          Josue received the following  modalities and/or manual therapy techniques:      Modality/Manual Therapy Time   Compex Muscle recovery  15mins                                Anna Blanchard, SHAZIA, LAT, ATC    [facility]     [email or web address]   [email or web address]    ---------------------------------------------------------------------------------------  ---------------------------------------------------------------------------------------     "

## 2024-01-09 NOTE — PROGRESS NOTES
Discussed with AT, Anna Blanchard, results of recent MRI via telephone call. Cleared to progress back to practice as tolerated, as symptoms of soft tissue leg pain and subjective weakness continue to improve. No new location of stress fracture or large hematoma on recent MRI. Pain likely secondary to medial calf strain at the medial gasrtoc/soleus interface. Plan to f/u with Dr. Jaxson Gtz in TR next week, sooner if needed.

## 2024-01-16 ENCOUNTER — ATHLETIC TRAINING SESSION (OUTPATIENT)
Dept: SPORTS MEDICINE | Facility: CLINIC | Age: 24
End: 2024-01-16
Payer: COMMERCIAL

## 2024-01-16 DIAGNOSIS — M89.8X6 PAIN OF RIGHT TIBIA: Primary | ICD-10-CM

## 2024-01-16 NOTE — PROGRESS NOTES
Subjective:       Chief Complaint: Josue Campos is a 23 y.o. male student at [employer] who had concerns including Pain of the Right Lower Leg. And Right shoulder pain    Handedness: right-handed  Sport played: basketball      Level: college            Pain  This is a chronic problem.     Review of Systems   All other systems reviewed and are negative.              Objective:       General: Josue is well-developed, well-nourished, appears stated age, in no acute distress, alert and oriented to time, place and person.     AT Session          Assessment:     Status: AT - Cleared to Exert    Date of Injury:  chronic/recurrent    Date Out:  n/a    Date Cleared:  n/a      Plan:   ---------------------------------------------------------------------------------------  ---------------------------------------------------------------------------------------    [facility]  Date:1/20/2024    Subjective: Pt stated pain is still present but has not increased    Objective: Swelling decreased over previous stress fracture location bump still present    Foam Roll 5mins    Josue received the following  modalities and/or manual therapy techniques:      Modality/Manual Therapy Time   Ultrasound 3mhz .8 intensity  100% (tibialis anterior)    5mins                                Anna Blanchard, MUMTAZT, LAT, ATC    [facility]     [email or web address]   [email or web address]    ---------------------------------------------------------------------------------------  ---------------------------------------------------------------------------------------   ---------------------------------------------------------------------------------------  ---------------------------------------------------------------------------------------    [facility]  "Sports Medicine  Date:1/19/2024    Subjective: Pt stated he is feeling a little more pain than previous days    Objective: Swelling decreased over previous stress fracture location bump still present   Fulcrum test: negative  TTP on previous Stress fracture location    Subjective R shoulder: Pt stated his right shoulder began to hurt last night after the game. (Evaluation completed)    Josue completed therapeutic exercises to develop strength       Exercise Reps/Sets/Time Weight #   Arch Crunch  3x10 2"hold    2 way De-rust squat x10    3 way heel raise w/ball squeeze 3x10    Pivot Lunges 3x5    SL balance  3x10"    Foam Roll  5mins    3 way calf stretch 3x30"                                      Josue received the following  modalities and/or manual therapy techniques:      Modality/Manual Therapy Time   Moving cups (Tibialis Anterior) 5mins   Cups R shoulder 10mins                            SHAZIA Viveros, LAT, ATC    [facility] [place]    [facility]     [email or web address]   [email or web address]    ---------------------------------------------------------------------------------------  ---------------------------------------------------------------------------------------   ---------------------------------------------------------------------------------------  ---------------------------------------------------------------------------------------    [facility] [place]  [facility]  Date:1/18/2024    Subjective: Pt stated pain is still present but has not increased    Objective: Swelling decreased over previous stress fracture location bump still present       Foam Roll 5mins      Clairekishorebushra received the following  modalities and/or manual therapy techniques:      Modality/Manual Therapy Time   Ultrasound 3mhz .8 intensity  100% (tibialis anterior)    5mins                                Anna Blanchard, " "MSAT, LAT, ATC    [facility]     [email or web address]   [email or web address]    ---------------------------------------------------------------------------------------  ---------------------------------------------------------------------------------------   ---------------------------------------------------------------------------------------  ---------------------------------------------------------------------------------------    [facility]  Date:1/17/2024    Subjective: Pt stated pain is still present but has not increased    Objective: Swelling decreased over previous stress fracture location bump still present      Jadae completed therapeutic exercises to develop strength       Exercise Reps/Sets/Time Weight #   Foam roll calves 5mins    3 way heel & Toe walks x3    3 way Soleus Raises  3x10    3 way towel scrunch 3x3    Pivot Taps on towel 3x10    SL towel Squats  3x5    3 way calf stretch 3x30"                                      Josue received the following  modalities and/or manual therapy techniques:      Modality/Manual Therapy Time   Ultrasound 3mhz .8 intensity  100% (tibialis anterior)  5mins   Compex Calf 15mins                            SHAZIA Viveros, LAT, ATC    [facility]     [email or web address]   [email or web address]    ---------------------------------------------------------------------------------------  ---------------------------------------------------------------------------------------   ---------------------------------------------------------------------------------------  ---------------------------------------------------------------------------------------    [facility] " [facility]  Date:1/16/2024    Subjective: Pt stated pain is still present but has not increased    Objective: minimal swelling present over previous stress fracture location      Josue received the following  modalities and/or manual therapy techniques:    Foam Roll Calves  5mins    Modality/Manual Therapy Time   Compex Pain relief  15mins                                Anna Blanchard, MUMTAZT, LAT, ATC    [facility]     [email or web address]   [email or web address]    ---------------------------------------------------------------------------------------  ---------------------------------------------------------------------------------------   ---------------------------------------------------------------------------------------  ---------------------------------------------------------------------------------------    [facility]  Date:1/14/2024    Subjective: Pt stated his right shin started to hurt again on 1/13/2024 during the game. Pt stated the pain feels a little more pain than when he was first diagnosed with shin splints the first time last year but does not hurt as much as when he was diagnosed with a stress fracture last year in the spring.       Objective: Discoloration and bump over previous stress fracture location. Pt stated bump is a little more raised and swollen than normal.   PROM:   Dorsiflexion: WNL compared bilaterally no pain  Plantar flexion:WNL compared bilaterally no pain  Eversion: WNL compared bilaterally no pain  Inversion: WNL compared bilaterally no pain  AROM:  Dorsiflexion: WNL compared bilaterally no pain  Plantar flexion :WNL compared bilaterally no pain  Eversion: WNL compared bilaterally no pain  Inversion: WNL compared bilaterally no pain  MMT  Gastrocnemius: 5/5 no pain  Soleus: 5/5 no pain  Anterior tibialis: 5/5 no pain  Posterior  Tibialis:5/5 no pain  Flexor Hallux Longus: 5/5 no pain  Calf Raise: able to perform with no pain.  Jumping: minimal pain present    Special test:  Fulcrum: negative  Squeeze: negative  Tap: pain present on previous stress fracture location. No pain present along shaft of tibia  TTP along anterior tibialis    Impression: Medial tibia stress syndrome  Plan: Estim and IASTM completed to decrease pain. Long term: pt will continue MMTS exercises in ATF to decrease pain.    Josue received the following  modalities and/or manual therapy techniques:      Modality/Manual Therapy Time   Estim Compex pain relief  15mins   IASTM  3mins                            Anna Blanchard, MSAT, LAT, ATC    [facility] [place]    [facility]     [email or web address]   [email or web address]    ---------------------------------------------------------------------------------------  ---------------------------------------------------------------------------------------

## 2024-01-19 ENCOUNTER — ATHLETIC TRAINING SESSION (OUTPATIENT)
Dept: SPORTS MEDICINE | Facility: CLINIC | Age: 24
End: 2024-01-19
Payer: COMMERCIAL

## 2024-01-19 DIAGNOSIS — M25.511 ACUTE PAIN OF RIGHT SHOULDER: Primary | ICD-10-CM

## 2024-01-19 NOTE — PROGRESS NOTES
Subjective:       Chief Complaint: Josue Campos is a 23 y.o. male student at [employer] who had concerns including Pain of the Right Shoulder.    Handedness: right-handed  Sport played: basketball      Level: college            Pain  This is a new problem. The current episode started yesterday.       Review of Systems   All other systems reviewed and are negative.                Objective:       General: Josue is well-developed, well-nourished, appears stated age, in no acute distress, alert and oriented to time, place and person.             Right Shoulder Exam     Inspection/Observation   Swelling: absent  Bruising: absent  Deformity: absent    Tenderness   Right shoulder tenderness location: Upper Trap Muscle.    Range of Motion   Active abduction:  normal   Passive abduction:  normal   Extension:  normal   Forward Flexion:  normal   Forward Elevation: normal  Adduction: normal  External Rotation 0 degrees:  normal   External Rotation 90 degrees: normal  Internal rotation 0 degrees:  normal   Internal rotation 90 degrees:  normal     Muscle Strength   The patient has normal right shoulder strength.    Tests & Signs   Cross arm: negative  Kinney test: negative  Impingement: negative  Belly Press: negative  Active Compression Test ([place]'s Sign): negative  Yergason's Test: negative  Speed's Test: negative  Bear Hug: negative    Other   Sensation: normal    Left Shoulder Exam   Left shoulder exam is normal.       Shoulder: right  The affected shoulder is compared to the contralateral shoulder.    Observation:    CERVICAL SPINE  Normal head carriage. Normal thoracic kyphosis.  Full AROM in flexion, extension, sidebending, and rotation.    SHOULDER  No ecchymosis, edema, or erythema throughout the shoulder girdle.  No sternal, clavicular, or acromial deformities bilaterally.  No atrophy of the pectorals, deltoids, supraspinatus, infraspinatus, or biceps bilaterally.  No asymmetry of shoulders  bilaterally.    ROM:  Active flexion to 180° bilaterally.   Active abduction to 180° bilaterally.    Active internal rotation to T7 bilaterally.    Active external rotation to T4 bilaterally.    No scapular dyskinesia or winging.    Tenderness:  Yes tenderness at the SC or AC joint  No tenderness over the clavicle   No tenderness over biceps tendon or bicipital groove  No tenderness over subacromial space    Strength Testing:  Deltoid - 5/5  Biceps - 5/5  Triceps - 5/5  Wrist extension - 5/5  Wrist flexion - 5/5   - 5/5  Finger extension - 5/5  Finger abduction - 5/5    Special Tests:    Empty can test - negative  Full can test - negative  Bear hug test - negative  Belly press test - negative    Hawkin's-Morro test - negative    ODavids test - negative    Yergason's test - negative    Sulcus sign - none  Anterior apprehension test - negative  Posterior apprehension test - negative    Neurovascular Exam:  Sensation intact to light touch in the distal median, radial, and ulnar nerve distributions bilaterally.  Negative Tinel's at carpal tunnel  Negative Tinel's at cubital tunnel  2+/4 reflexes at triceps, biceps, and brachioradialis  Capillary refill intact <2 seconds in all digits bilaterally              Assessment:     Status: F - Full Participation    Date Seen:  1/19/2024    Date of Injury:  1/18/2024    Date Out:  N/A    Date Cleared:  N/A      Plan:       1. Cupping was completed to decrease pain in shoulder. Pt instructed to inform ATC if pain increases or continues  2. Physician Referral: no  3. ED Referral:no  4. Parent/Guardian Notified: No  5. All questions were answered, ath. will contact me for questions or concerns in  the interim.  6.         Eligible to use School Insurance: Yes

## 2024-01-22 ENCOUNTER — ATHLETIC TRAINING SESSION (OUTPATIENT)
Dept: SPORTS MEDICINE | Facility: CLINIC | Age: 24
End: 2024-01-22
Payer: COMMERCIAL

## 2024-01-22 DIAGNOSIS — M25.572 ACUTE LEFT ANKLE PAIN: ICD-10-CM

## 2024-01-22 DIAGNOSIS — M79.604 RIGHT LEG PAIN: ICD-10-CM

## 2024-01-22 NOTE — PROGRESS NOTES
"Subjective:       Chief Complaint: Josue Campos is a 23 y.o. male student at [employer] who had concerns including Pain of the Right Lower Leg and Injury and Pain of the Left Ankle.    Handedness: right-handed  Sport played: basketball      Level: college                Review of Systems   All other systems reviewed and are negative.                Objective:       General: Josue is well-developed, well-nourished, appears stated age, in no acute distress, alert and oriented to time, place and person.     General    Psychiatric: He has a normal mood and affect. His behavior is normal. Judgment and thought content normal.             Assessment:     Status: OUT    Date of Injury:  Tibia: 1/13/2024 Ankle: 1/25/2024    Date Out:  Ankle 1/27/2024    Date Cleared:  LIZ      Plan:     ---------------------------------------------------------------------------------------  ---------------------------------------------------------------------------------------    [facility]  Date:1/27/2024    Subjective: Ankle: Pt stated his ankle is in a lot of pain and continues to feel weak with random movements/steps. Pt attempted to practice but did not due to pain/weakness. Pt stated no numbness and tingling is present.    Objective: Minimal laxity present with anterior drawer Peroneal nerve glides decreased pain in ankle     ATC Notes: Pt was evaluated by PT Resident Dottie Solis.      Josue completed therapeutic exercises to develop strength     Ankle/MTSS exercises completed   Josue completed therapeutic exercises to develop strength       Exercise Reps/Sets/Time Weight #   Foam roll calves 5mins    3 way heel & Toe walks x3    3 way Soleus Raises  3x10    3 way towel scrunch 3x3    Pivot Taps on towel 3x10    SL towel Squats  3x5    3 way calf stretch 3x30"         SL arch crunch 3x30" each leg    SL squat w/ arch crunch 1x10 each leg    Active Joint mobs w/ " oscillations kneeling/sitting 1x20                  Braelee received the following  modalities and/or manual therapy techniques:      Modality/Manual Therapy Time   Manual ankle joint mobs A to P     IASTM calf on stretch 3min   Estim Compex pain relief  x3                        SHAZIA Viveros, LAT, HENOK    [facility]     [email or web address]   [email or web address]    ---------------------------------------------------------------------------------------  ---------------------------------------------------------------------------------------       [facility]  Date:1/26/2024    Left ankle evaluation completed in separate encounter      Modality/Manual Therapy Time   Moving Cups Left Calf medial ankle 5mins                                SHAZIA Viveros, LAT, HENOK    [facility]     [email or web address]   [email or web address]        ---------------------------------------------------------------------------------------  ---------------------------------------------------------------------------------------    [facility]  Date:1/24/2024 1/25/2024    Subjective: Tibia Pt stated pain feels the same no increase or decrease in pain      Modality/Manual Therapy Time   Manual Joint mobs A to P     IASTM calf on stretch 3mins                            Anna Blanchard, MUMTAZT, LAT, ATC    [facility]     [email or web address]   [email or web address]    ---------------------------------------------------------------------------------------  ---------------------------------------------------------------------------------------  "  ---------------------------------------------------------------------------------------  ---------------------------------------------------------------------------------------    [facility]  Date:1/23/2024    Subjective: Previous stress fracture location TTP    Objective: decreased dorsiflexion assessed. Navicular drop present in R foot compared bilaterally     ATC Comments: Pt evaluated by PT Vci Griffiths       Josue completed therapeutic exercises to develop ROM       Exercise Reps/Sets/Time Weight #   Active Joint mobs R ankle  1x20    SL arch crunch  3x30"    SL squat w/ arch crunch 1x10 each leg                                                          Josue received the following  modalities and/or manual therapy techniques:      Modality/Manual Therapy Time   Manual Joint mobs A to P    IASTM calf on stretch 3mins                            Anna Blanchard, MUMTAZT, LAT, ATC    [facility]     [email or web address]   [email or web address]    ---------------------------------------------------------------------------------------  ---------------------------------------------------------------------------------------   ---------------------------------------------------------------------------------------  ---------------------------------------------------------------------------------------    [facility]  Date: 1/22/2023    Subjective: Pt stated shin pain is the same has not decreased or increased   ATC Comments: PT Vic Youngmen will evaluated pt tomorrow in ATF.    Objective: TTP on location of previous stress fracture. Fulcrum test: negative    Josue completed therapeutic exercises to develop strength       Exercise Reps/Sets/Time Weight #   Foam roll calves 5mins    3 way heel & Toe " "walks x3    3 way Soleus Raises  3x10    3 way towel scrunch 3x3    Pivot Taps on towel 3x10    SL towel Squats  3x5    3 way calf stretch 3x30"                       Josue received the following  modalities and/or manual therapy techniques:      Modality/Manual Therapy Time   Compex muscle recovery 15mins                                Anna Blanchard, MUMTAZT, LAT, ATC    [facility]     [email or web address]   [email or web address]    ---------------------------------------------------------------------------------------  ---------------------------------------------------------------------------------------           "

## 2024-01-28 ENCOUNTER — ATHLETIC TRAINING SESSION (OUTPATIENT)
Dept: SPORTS MEDICINE | Facility: CLINIC | Age: 24
End: 2024-01-28
Payer: COMMERCIAL

## 2024-01-28 DIAGNOSIS — M25.572 ACUTE LEFT ANKLE PAIN: Primary | ICD-10-CM

## 2024-01-28 NOTE — PROGRESS NOTES
"Subjective:       Chief Complaint: Josue Campos is a 23 y.o. male student at [employer] who had concerns including Pain and Injury of the Left Ankle.    Evaluation completed on 1/26/2024  Pt stated during the game on 1/25/2024 he felt his ankle feel weak and give out after slamming his foot on the ground when running" (forced plantar flexion). Pt stated he was able to complete the game but has been in a lot of pain since 1/25/2024. Pt stated pain feels dull achy. Pt stated his ankle feels like it is gives out randomly when walking. 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     Handedness: right-handed  Sport played: basketball      Level: college            Pain  This is a new problem. The current episode started in the past 7 days.   Injury      Review of Systems   All other systems reviewed and are negative.                Objective:       General: Josue is well-developed, well-nourished, appears stated age, in no acute distress, alert and oriented to time, place and person.     General    Psychiatric: He has a normal mood and affect. His behavior is normal. Judgment and thought content normal.         Right Ankle/Foot Exam   Right ankle exam is normal.    Left Ankle/Foot Exam     Inspection  Deformity: absent  Bruising:  Foot - absent  Effusion:  Foot - absent    Tenderness   The patient is tender to palpation of the lateral malleolus and medial malleolus.    Range of Motion   The patient has normal left ankle ROM.   Ankle Joint  Plantar flexion:  normal     Subtalar Joint   Inversion:  normal   Eversion:  normal   Valdez Test:  normal  First MTP Joint: normal    Tests   Heel Walk: able to perform  Tiptoe Walk: able to perform  Single Heel Rise: able to perform    Other   Sensation: normal    Comments:  Pain located posterior ankle       Muscle Strength   Left Lower Extremity   Anterior tibial:  4/5 (minimal weakness present compared bilaterally)   Posterior tibial:  5/5   Gastrocsoleus:  4/5 " (minimal weakness present compared bilaterally)   FDL: 5/5  EDL: 5/5  FHL: 5/5    Reflexes     Left Side  Tinel Sign: absent    Vascular Exam       Left Pulses  Left DP grade: normal.  Left PT grade: normal.        Edema  Left Lower Leg: absent            Assessment:   Impression: Chronic ankle instability, Peroneal nerve impingement.     Status: O - Out    Date Seen:  1/26/2024    Date of Injury:  1/25/2024    Date Out:  1/27/2024    Date Cleared:  TBD      Plan:       1. Pt was referred to PT resident and Team physician for further evaluation.   2. Physician Referral: yes  3. ED Referral:no  4. Parent/Guardian Notified: No  5. All questions were answered, ath. will contact me for questions or concerns in  the interim.  6.         Eligible to use School Insurance: Yes

## 2024-01-29 ENCOUNTER — OFFICE VISIT (OUTPATIENT)
Dept: SPORTS MEDICINE | Facility: CLINIC | Age: 24
End: 2024-01-29
Payer: COMMERCIAL

## 2024-01-29 ENCOUNTER — HOSPITAL ENCOUNTER (OUTPATIENT)
Dept: RADIOLOGY | Facility: HOSPITAL | Age: 24
Discharge: HOME | End: 2024-01-29
Attending: ORTHOPAEDIC SURGERY
Payer: COMMERCIAL

## 2024-01-29 VITALS
WEIGHT: 210.75 LBS | BODY MASS INDEX: 24.88 KG/M2 | SYSTOLIC BLOOD PRESSURE: 159 MMHG | HEART RATE: 44 BPM | DIASTOLIC BLOOD PRESSURE: 89 MMHG | HEIGHT: 77 IN

## 2024-01-29 DIAGNOSIS — G89.29 CHRONIC PAIN OF LEFT ANKLE: ICD-10-CM

## 2024-01-29 DIAGNOSIS — G89.29 CHRONIC PAIN OF LEFT ANKLE: Primary | ICD-10-CM

## 2024-01-29 DIAGNOSIS — M25.872 IMPINGEMENT OF LEFT ANKLE JOINT: ICD-10-CM

## 2024-01-29 DIAGNOSIS — M25.572 CHRONIC PAIN OF LEFT ANKLE: Primary | ICD-10-CM

## 2024-01-29 DIAGNOSIS — M25.571 RIGHT ANKLE PAIN, UNSPECIFIED CHRONICITY: ICD-10-CM

## 2024-01-29 DIAGNOSIS — M25.572 CHRONIC PAIN OF LEFT ANKLE: ICD-10-CM

## 2024-01-29 PROCEDURE — 73610 X-RAY EXAM OF ANKLE: CPT | Mod: 26,LT,, | Performed by: INTERNAL MEDICINE

## 2024-01-29 PROCEDURE — 1160F RVW MEDS BY RX/DR IN RCRD: CPT | Mod: CPTII,S$GLB,, | Performed by: ORTHOPAEDIC SURGERY

## 2024-01-29 PROCEDURE — 73721 MRI JNT OF LWR EXTRE W/O DYE: CPT | Mod: TC,LT

## 2024-01-29 PROCEDURE — 3008F BODY MASS INDEX DOCD: CPT | Mod: CPTII,S$GLB,, | Performed by: ORTHOPAEDIC SURGERY

## 2024-01-29 PROCEDURE — 73721 MRI JNT OF LWR EXTRE W/O DYE: CPT | Mod: 26,LT,, | Performed by: RADIOLOGY

## 2024-01-29 PROCEDURE — 99999 PR PBB SHADOW E&M-EST. PATIENT-LVL III: CPT | Mod: PBBFAC,,, | Performed by: ORTHOPAEDIC SURGERY

## 2024-01-29 PROCEDURE — 73610 X-RAY EXAM OF ANKLE: CPT | Mod: TC,LT

## 2024-01-29 PROCEDURE — 3079F DIAST BP 80-89 MM HG: CPT | Mod: CPTII,S$GLB,, | Performed by: ORTHOPAEDIC SURGERY

## 2024-01-29 PROCEDURE — 99214 OFFICE O/P EST MOD 30 MIN: CPT | Mod: S$GLB,,, | Performed by: ORTHOPAEDIC SURGERY

## 2024-01-29 PROCEDURE — 3077F SYST BP >= 140 MM HG: CPT | Mod: CPTII,S$GLB,, | Performed by: ORTHOPAEDIC SURGERY

## 2024-01-29 PROCEDURE — 1159F MED LIST DOCD IN RCRD: CPT | Mod: CPTII,S$GLB,, | Performed by: ORTHOPAEDIC SURGERY

## 2024-01-29 NOTE — PROGRESS NOTES
"CC: left ankle pain    23 y.o. Male presents today for evaluation of his left ankle pain. He admits to intermittent ankle instability events throughout the season and states he feel as though "whole lower leg goes numb when it happens." He states his most recent event was 2024 in a game when he was running and felt as though his ankle gave out. He gestures to the posteromedial ankle and Achilles when asked where he hurts.   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 No pertinent past medical history.    PAST SURGICAL HISTORY:   History reviewed. No pertinent surgical history.    FAMILY HISTORY:   History reviewed. No pertinent family history.    SOCIAL HISTORY:   Social History     Socioeconomic History    Marital status: Single       MEDICATIONS:   No current outpatient medications on file.    ALLERGIES:   Review of patient's allergies indicates:  No Known Allergies     PHYSICAL EXAMINATION:  BP (!) 159/89   Pulse (!) 44   Ht 6' 5" (1.956 m)   Wt 95.6 kg (210 lb 12.2 oz)   BMI 24.99 kg/m²   Vitals signs and nursing note have been reviewed.  General: In no acute distress, well developed, well nourished, no diaphoresis  Eyes: EOM full and smooth, no eye redness or discharge  HENT: normocephalic and atraumatic, neck supple, trachea midline, no nasal discharge, no external ear redness or discharge  Cardiovascular: 2+ and symmetric radial and DP pulses bilaterally, no LE edema  Lungs: respirations non-labored, no conversational dyspnea   Abd: non-distended, no rigidity  MSK: no amputation or deformity, no swelling of extremities  Neuro: AAOx3, CN2-12 grossly " intact  Skin: No rashes, warm and dry  Psychiatric: cooperative, pleasant, mood and affect appropriate for age    ANKLE: left   The affected ankle is compared to the contralateral ankle.    Observation:    There is no edema, erythema, or ecchymosis.   Shoes reveal a normal wear pattern.  No structural deformities including pes planus/cavus, hallux valgus, or toe deformities.   Negative too-many toes sign.  Normal callus pattern on the feet bilaterally.    Achilles tendon and calcaneal insertion reveals no deformities  No leg or intrinsic foot muscle atrophy.  Squatting reveals symmetric pronation of the bilateral feet.   Able to rise on toes with symmetric supination.    Normal gait without evidence of antalgia.    ROM: (expected degree)   Active dorsiflexion to 20° bilaterally.    Active plantarflexion to 50° bilaterally.    Active ankle inversion to 35° bilaterally.    Active ankle eversion to 15° bilaterally.    Full active flexion/extension of the toes bilaterally.   Heel cords without tightness bilaterally.    Tenderness To Palpation   No tenderness at the ATFL, CFL, PTFL, or deltoid ligaments  No tenderness over the distal anterior syndesmosis, distal tibia/fibula, fibular head/shaft  + tenderness at medial malleoli  No tenderness at the lateral malleoli   No tenderness at navicular, cuboid, cuneiforms, talus, or calcaneous  No tenderness along the metatarsals or phalanges  No tenderness at the Achilles tendon calcaneal insertion  + tenderness over the tarsal tunnel  No tenderness at the peroneal tendons    Strength Testing: (*pain)  Dorsiflexion - 5/5  Platarflexion - 5/5  Resisted Inversion - 5/5  Resisted Eversion - 5/5 - pain on left  Great Toe Extension - 5/5  Great Toe Flexion - 5/5    Special Tests:  Anterior talar drawer - negative and without dimpling  Talar tilt - positive  Reverse talar tilt - positive    Heel tap test - negative  Distal tib/fib squeeze test - negative  Valdez squeeze test -  negative    Metatarsal squeeze test - negative  Midfoot stress test - negative  Calcaneal squeeze test - negative    No subluxation of the peroneal tendons with resisted eversion.    Vascular/Sensory Exam:  DP and PT pulses intact BL  No skin changes, no abnormal hair distribution  Sensation intact to light touch throughout the saphenous, sural, superficial peroneal, deep peroneal, and tibial nerve distributions  Negative Tinel's test over tarsal tunnel  Capillary refill intact <2 seconds in all toes bilaterally.      IMAGIN. X-ray ordered due to left ankle pain   2. X-ray images were reviewed personally by me and then directly with patient.  3. FINDINGS: X-ray images obtained demonstrate bony hypertrophy at the medial tibiotalar joint   4. IMPRESSION: No pathology or irregularities appreciated.       ASSESSMENT:      ICD-10-CM ICD-9-CM   1. Chronic pain of left ankle  M25.572 719.47    G89.29 338.29   2. Impingement of left ankle joint  M25.872 719.87         PLAN:  1-2. 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  Recently had a game he landed hard on his left foot and he felt pain shoot up his leg as well as in intense sensation instability.  This comes and goes and has been persistent over the past week.    - XRs ordered in the office today and images were personally reviewed with the patient. See above for further detail.    - Symptoms, exam, and imaging are most consistent with possible tibial nerve irritation secondary to medial joint hypertrophy, possible intra-articular swelling.  Further evaluation is needed to help better target pathology and establish treatment plan.    - An MRI of the left ankle has been ordered and scheduled due to instability and abnormal x-ray findings.     - Ok to continue with basketball activity as tolerated.     - Contact has been  made with their  who is on board with the plan.       Future planning includes - next steps pending MRI results    All questions were answered to the best of my ability and all concerns were addressed at this time.    Follow up after MRI for results and next steps.       This note is dictated using the M*Modal Fluency Direct word recognition program. There are word recognition mistakes that are occasionally missed on review.      Total time spent face-to face with patient counseling or coordinating care including prognosis, differential diagnosis, risks and benefits of treatment, instructions, compliance risk reductions as well as non-face-to-face time personally spent reviewing medial record, medical documentation, and coordination of care.     EST MINUTES X   11694 10-19    59947 20-29    80799 30-39 X   99215 40-54    NEW     14661 15-29    62051 30-44    85776 45-59    96838 60-74    PHONE      5-10    [id number] 11-20    89821 21-30

## 2024-01-30 ENCOUNTER — ATHLETIC TRAINING SESSION (OUTPATIENT)
Dept: SPORTS MEDICINE | Facility: CLINIC | Age: 24
End: 2024-01-30
Payer: COMMERCIAL

## 2024-01-30 DIAGNOSIS — M89.8X6 PAIN OF RIGHT TIBIA: ICD-10-CM

## 2024-01-30 DIAGNOSIS — M25.572 ACUTE LEFT ANKLE PAIN: ICD-10-CM

## 2024-01-30 NOTE — PROGRESS NOTES
Subjective:       Chief Complaint: Josue Campos is a 23 y.o. male student at [employer] who had concerns including Pain of the Right Lower Leg and Pain of the Left Ankle.    Handedness: right-handed  Sport played: basketball      Level: college              Review of Systems   All other systems reviewed and are negative.                Objective:       General: Josue is well-developed, well-nourished, appears stated age, in no acute distress, alert and oriented to time, place and person.     AT Session          Assessment:     Status: AT - Cleared to Exert    Date of Injury:  Tibia: 1/13/2024   Left ankle: 1/20/2024    Date Out:  N/A    Date Cleared:  1/29/2024      Plan:       [facility] [employer]       Athletic Training Phone/Text Conversation     Name: Josue Campos  Clinic Number: [id number]  Sport: Men's Basketball      Notes   2/3/2024    Pt was evaluated by PT Dottie Wilson, PT Vic Griffiths and Dr.David Gtz after Xray results showed no fracture.     Pt was cleared to play.   Discussion was had with Pt and pt's parents about further plan of care and taking an antiinflammatory may help decrease some of his pain.     Pt will continue to update ATC on L ankle pain. Pt was scheduled to see PT Vic Griffiths in clinic for physical therapy.     YO Bailey, ATC    [employer] [employer] [facility]     -----------------------------------------------------------------------------------------        [facility]     Athletic Training Phone/Text Conversation     Name: Josue Campos  Clinic Number: [id number]  Sport: Men's Basketball      Type of Communication: text      Notes   2/2/2024  Pt stated he hurt his ankle during the game on 2/1/24 and felt a crack when step down and is having a hard time walking. Pt stated he is also sick and throwing  "up. Unable to come to practice. Pt was scheduled for an xray Saturday morning 2/3/24.      Anna MCCRYA, YO, ATC    [employer] [employer] [facility]       --------------------------------------------------------------------------------------  ---------------------------------------------------------------------------------------    [facility]  Date:1/31/2024    Subjective: Pt stated he is doing okay but still has ankle pain    Braelee completed therapeutic exercises to develop strength       Exercise Reps/Sets/Time Weight #   Foam roll calves 5mins    3 way heel & Toe walks x3    3 way Soleus Raises  3x10    3 way towel scrunch 3x3    Pivot Taps on towel 3x10    SL towel Squats  3x5    3 way calf stretch 3x30"         SL arch crunch 3x30" each leg    SL squat w/ arch crunch 1x10 each leg    Active Joint mobs w/ oscillations kneeling/sitting 1x20                  Braelee received the following  modalities and/or manual therapy techniques:      Modality/Manual Therapy Time   Manual ankle joint mobs A to P     IASTM calf on stretch 3min   Estim Compex pain relief ankle 15mins                        SHAZIA Viveros, LAT, ATC    [facility]     [email or web address]    --------------------------------------------------------------------------------------  ---------------------------------------------------------------------------------------    [facility] [place]  [facility]  Date:1/30/2024    Subjective: Pt stated he is doing okay but still has ankle pain    Josue completed therapeutic exercises to develop strength       Exercise Reps/Sets/Time Weight #   2 way de-rust squat  x10    3 walk calf raise w/ ball squeeze 3x10    Pivot lunges 3x5    SL " "balance w/3D arms 3x10"    SL arch crunch 3x30" each leg    SL squat w/ arch crunch 1x10 each leg    Active Joint mobs w/ oscillations kneeling/sitting 1x20                  Josue received the following  modalities and/or manual therapy techniques:      Modality/Manual Therapy Time   Manual ankle joint mobs A to P     IASTM calf on stretch 3min   Estim Compex pain relief ankle 15mins                        Anna Blanchard, MSAT, LAT, ATC    [facility]     [email or web address]   [email or web address]    ---------------------------------------------------------------------------------------  ---------------------------------------------------------------------------------------   ---------------------------------------------------------------------------------------  ---------------------------------------------------------------------------------------    [facility]  Date:1/29/2024    Subjective: Pt stated he is doing okay not better nor worse    Braelee completed therapeutic exercises to develop ROM     Exercise Reps/Sets/Time Weight #   Foam roll calves 5mins    3 way heel & Toe walks x3    3 way Soleus Raises  3x10    3 way towel scrunch 3x3    Pivot Taps on towel 3x10    SL towel Squats  3x5    3 way calf stretch 3x30"      Active Joint mobs R ankle  1x20    SL arch crunch  3x30"    SL squat w/ arch crunch 1x10 each leg             Braelee received the following  modalities and/or manual therapy techniques:      Modality/Manual Therapy Time   Manual Joint mobs A to P    IASTM calf on stretch 3mins                            Anna Blanchard, MUMTAZT, LAT, ATC    [facility] [place]    [facility]     [email or web address]   [email or web address]    ---------------------------------------------------------------------------------------  ---------------------------------------------------------------------------------------

## 2024-01-31 ENCOUNTER — TELEPHONE (OUTPATIENT)
Dept: SPORTS MEDICINE | Facility: CLINIC | Age: 24
End: 2024-01-31
Payer: COMMERCIAL

## 2024-01-31 DIAGNOSIS — G89.29 CHRONIC PAIN OF LEFT ANKLE: Primary | ICD-10-CM

## 2024-01-31 DIAGNOSIS — M25.872 IMPINGEMENT OF LEFT ANKLE JOINT: ICD-10-CM

## 2024-01-31 DIAGNOSIS — M25.373 INSTABILITY OF ANKLE, UNSPECIFIED LATERALITY: ICD-10-CM

## 2024-01-31 DIAGNOSIS — M89.8X6 PAIN OF RIGHT TIBIA: ICD-10-CM

## 2024-01-31 DIAGNOSIS — M25.572 CHRONIC PAIN OF LEFT ANKLE: Primary | ICD-10-CM

## 2024-01-31 NOTE — TELEPHONE ENCOUNTER
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 He practiced today and is overall feeling OK.      IMAGIN. X-ray ordered due to left ankle pain   2. X-ray images were reviewed personally by me and then directly with patient.  3. FINDINGS: X-ray images obtained demonstrate bony hypertrophy at the medial tibiotalar joint   4. IMPRESSION: No pathology or irregularities appreciated.     1. MRI obtained 2024 due to left ankle pain   2. MRI images were reviewed personally by me and then directly with patient.  3. FINDINGS: MRI images obtained demonstrate chronic injury at the level of the anterior talofibular ligament with heterogeneous appearance and stripping of the fascial sleeve lateral malleolus. Subcortical cyst medial malleolus with bone marrow edema, anterior tibiotalar spurring, small joint effusion   4. IMPRESSION: As above.       Assessment:  Chronic pain of left ankle/impingement of left ankle joint       Plan:     Options discussed and he would like to think on watchful waiting, continue with current rehab/NSAIDs/taping, vs IA ankle joint CSI    Medications - ok to continue with OTC NSAIDs as needed    Injection - possible ankle joint CSI for diagnostic and hopefully therapeutic purposes    Exercises - ankle strengthening/stabilization    Referrals - none at this time    Imaging - none further at this time    ATR - Sport Participation: Full sport participation    Follow up - possible ankle joint CSI            This note was completed in the presence of the physician noted above    This note is dictated using the M*Modal Fluency Direct word recognition program. There are word recognition mistakes that are occasionally missed on review.

## 2024-02-02 DIAGNOSIS — G89.29 CHRONIC PAIN OF LEFT ANKLE: Primary | ICD-10-CM

## 2024-02-02 DIAGNOSIS — M25.572 CHRONIC PAIN OF LEFT ANKLE: Primary | ICD-10-CM

## 2024-02-02 DIAGNOSIS — M25.872 IMPINGEMENT OF LEFT ANKLE JOINT: ICD-10-CM

## 2024-02-03 ENCOUNTER — HOSPITAL ENCOUNTER (OUTPATIENT)
Dept: RADIOLOGY | Facility: OTHER | Age: 24
Discharge: HOME | End: 2024-02-03
Attending: ORTHOPAEDIC SURGERY
Payer: COMMERCIAL

## 2024-02-03 DIAGNOSIS — G89.29 CHRONIC PAIN OF LEFT ANKLE: ICD-10-CM

## 2024-02-03 DIAGNOSIS — M25.572 CHRONIC PAIN OF LEFT ANKLE: ICD-10-CM

## 2024-02-03 PROCEDURE — 73630 X-RAY EXAM OF FOOT: CPT | Mod: 26,LT,, | Performed by: RADIOLOGY

## 2024-02-03 PROCEDURE — 73610 X-RAY EXAM OF ANKLE: CPT | Mod: 26,LT,, | Performed by: RADIOLOGY

## 2024-02-03 PROCEDURE — 73610 X-RAY EXAM OF ANKLE: CPT | Mod: TC,FY,LT

## 2024-02-03 PROCEDURE — 73630 X-RAY EXAM OF FOOT: CPT | Mod: TC,FY,LT

## 2024-02-05 ENCOUNTER — CLINICAL SUPPORT (OUTPATIENT)
Dept: REHABILITATION | Facility: OTHER | Age: 24
End: 2024-02-05
Payer: COMMERCIAL

## 2024-02-05 DIAGNOSIS — R52 PAIN AGGRAVATED BY LIFTING: ICD-10-CM

## 2024-02-05 DIAGNOSIS — M25.671 ANKLE STIFFNESS, RIGHT: Primary | ICD-10-CM

## 2024-02-05 PROCEDURE — 97162 PT EVAL MOD COMPLEX 30 MIN: CPT | Mod: PN

## 2024-02-05 PROCEDURE — 97140 MANUAL THERAPY 1/> REGIONS: CPT | Mod: PN

## 2024-02-05 PROCEDURE — 97110 THERAPEUTIC EXERCISES: CPT | Mod: PN

## 2024-02-05 NOTE — PLAN OF CARE
[facility]  Physical Therapy Initial Evaluation    Date: 2/5/2024   Name: Josue Campos  Clinic Number: [id number]    Therapy Diagnosis:   Encounter Diagnoses   Name Primary?    Ankle stiffness, right Yes    Pain aggravated by lifting      Physician: Rosa Isela Gtz DOdd    Physician Orders: PT Eval and Treat   Medical Diagnosis from Referral: Impingement of left ankle joint [M25.872], Instability of ankle, unspecified laterality [M25.373], Pain of right tibia [M89.8X6]   Evaluation Date: 2/5/2024d  Authorization Period Expiration: 1/30/25  Plan of Care Expiration: 5/27/24  Visit # / Visits authorized: 1/ 1   FOTO Follow Up:   FOTO Follow UP:     Precautions: Standard    Time In: 2:30 PM   Time Out: 3:30 PM   Total Appointment Time (timed & untimed codes): 60 minutes    Subjective     Date of onset: Last year   History of current condition - 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 He previously played two years at [employer] and two years at [employer]. In his last year at [employer] he started having R shin pain and foot pain and followed up with Dr. Hunt. Imaging did not reveal any issue in R foot but revealed a stress fracture of the anterior cortex of the R tibia. At that time Dr. Hunt counseled him that it would likely need an intramedullary peggy. He opted against this and rehabbed in [place], reporting full improvement and no pain in the shin. However about 4 weeks ago he noted an increase in R shin pain that correlates with the area of his stress fracture. It bothers him with running and jumping, sometimes with walking but not at rest.     He also notes that he had one or two ankle sprains in high school but none in his first four years in college but since coming to [employer] has been rolling his ankles consistently, sometimes with simple forward or backwards running. Last week he came down on his L leg harder than he normally does from a jump  and noted a sharp pain under his foot. He reports that he did not roll the ankle, rather he came down straight on it. He reports being TTP to the medial malleolus and navicular and the area between. He was able to play through this pain Saturday but he notes it did hurt pretty significantly. MRI results are below demonstrating subchondral cyst and bone marrow edema.     He also has a history of stress reaction in his L3-4 during high school but this has not been causing him problems.    He has been working on talocrural mobility, arch control and single leg balance at my request in the training room with Yasemin. He reports that he has been eating well and has been eating even more recently including appropriate protein, carbs and fat. He also notes that his Vitamin D levels have been tested and have been adequate. He does note that he has not been sleeping well recently d/t increased stress from school and basketball.     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     3 x 10 x 10 mm subcortical cyst medial malleolus with adjacent bone marrow edema.     Anterior tibiotalar spurring.  Associated small joint effusion.    1/7/24: MRI R Ankle: Impression:     Chronic stress fracture anterior tibial margin corresponding with radiograph 01/02/2024.  No evidence for intramedullary edema, or periostitis.     Mild focal muscle edema, medial gastrocnemius/soleus level proximally, likely muscle strain.    Prior Therapy: in training room   Exercise Routine/Sport Participation: scholarship level varsity  at [employer]   Social History: unremarkable  Prior Level of Function: inconsistent b/l ankle pain   Current Level of Function: pain in R tibia and L ankle with ADL's and recreational activities     Pain:  Current 6/10, worst 8/10, best 2/10    Location: right shin and left ankle   Description: Aching, Dull, and Sharp  Aggravating Factors: Running, jumping, walking   Easing Factors: ice and rest    Pts goals: return to ADL's and scholarship level varsity athletics without R shin pain and L ankle pain       Medical History:   No past medical history on file.    Surgical History:   Josue Campos  has no past surgical history on file.    Medications:   Josue currently has no medications in their medication list.    Allergies:   Review of patient's allergies indicates:  No Known Allergies     Objective     Posture: increased dynamic navicular drop R, noted decreased muscle bulk R leg in quadriceps and lower leg.     Gait: increased dynamic navicular drop, early heel off R     Functional Tests:   SLS EO: :20  SLS EC: :20 with notably increased postural sway   OH Squat: decreased ankle dorsiflexion b/l, d/c depth  Single leg squat: R foot collapse, increased dynamic valgus decreased depth ; L notes anterior ankle pain     Ankle Active Range of Motion:   Ankle Right Left   DF 0 0   Plantarflexion 35 35   Inversion WNL WNL   Eversion WNL WNL   1st MTP Extension  25/50 25/50      Ankle Passive Range of Motion:   Ankle Right Left   DF (knee extended) 0 0   DF (knee bent) 29 degrees 34 degrees   Plantarflexion 50 50   Inversion WNL WNL   Eversion WNL WNL   1st MTP Extension  25/50 25/50     Knee Passive Range of Motion:   Right  Left    Flexion 130 130   Extension 10 hyper 10 hyper     Hip Passive Range of Motion:   Right  Left    Flexion 95 100   Extension 5 5   Ext. Rotation 50 45   Int. Rotation 5 20       Lower Extremity Strength   Right  Left    Dorsiflexion 5/5 5/5   Plantarflexion (standing) 4-/5 4/5   Inversion 5/5 5/5   Eversion 4/5 4/5   Hip extension 3+/5 4+/5   PGM  Adductor 3+/5  3/5 4/5  4/5       Special Tests:   Right Left   Anterior Drawer Test + +   External Rotation Stress Test - -   Compression (Squeeze) Test  - -   Fibularis Subluxation  Test  - -       Joint Mobility: b/l TCJ hypomobile AP and PA glide, R>L, R STJ lateral glide limited, R hip posterior and inferior glide limited     Palpation: TTP posterior lateral malleolus L, TTP L navicular, TTP anterior R tibia correlating with area of stress reaction on MRI     ANTT: + L Tibial nerve for reproduction of plantar pain       CMS Impairment/Limitation/Restriction for FOTO Intake Survey    Therapist reviewed FOTO scores for Josue Campos on 2/5/2024.   FOTO documents entered into SMR[facility] - see Media section.    Limitation Score: See media%  Category: Mobility       Treatment     Treatment Time In: 3:00 PM   Treatment Time Out: 3:30 PM   Total Treatment time separate from Evaluation: 30  minutes     Josue received the treatments listed below:      Therapeutic exercises to develop strength, endurance, ROM, and flexibility for 10 minutes including:  Adductor plank x :30, short lever   Staggered Glute Bridge x 15  Tibial nerve glides in supine 2 x 20   Pretzel x 15 R   Pt education     Manual therapy techniques: Joint mobilizations were applied to the: ankle and hip for 20 minutes, including:  TCJ distraction grade V   TCJ AP mob grade 2-3   Hip Distraction long axis grade 2-3   Lateral glide + Internal rotation grade 2-3 hip   Scoop mob inferior glide grade 2-3  Hip IR MET   Hip ER MET   Taz MET       Home Exercises and Patient Education Provided     Education provided:   - Etiology and problem with high risk anterior stress fracture  - Prognosis, activity modification, goals for therapy, role of therapy for care, exercises/HEP    Written Home Exercises Provided: Yes.  Exercises were reviewed and Josue was able to demonstrate them prior to the end of the session.   Pt received a written copy of exercises to perform at home. Josue demonstrated good  understanding of the education provided.     See EMR under patient instructions for exercises given.     Assessment     Josue is a 23 y.o. male  referred to outpatient Physical Therapy with s/s consistent with right anterior tibial bone stress injury and left ankle medial malleolar bone bruise secondary to bilateral chronic ankle instability. We discussed today that the most appropriate intervention for his right anterior tibial bone stress injury would be surgery involving the placement of an intramedullary peggy due to the high risk nature of this bone stress injury. He did not appear overly interested in this option but said he would think about it. I also counseled him to avoid extra running in practice, avoid jumping exercises in weights and to avoid calf loading to avoid tensioning the anterior cortex and decrease adverse bone loading. He presents today with decreased ankle ROM and strength, decreased hip ROM and strength, adverse neural tension and decreased motor control during functional activities.     Pt will benefit from skilled outpatient Physical Therapy to address the deficits stated above and in the chart below, provide pt/family education, and to maximize pt's level of independence. Pt prognosis is Guarded.    Plan of care discussed with patient: Yes  Pt's spiritual, cultural and educational needs considered and patient is agreeable to the plan of care and goals as stated below:       Anticipated Barriers for therapy: PMH       Medical Necessity is demonstrated by the following  History  Co-morbidities and personal factors that may impact the plan of care Co-morbidities:   See PMH     Personal Factors:   no deficits     moderate   Examination  Body Structures and Functions, activity limitations and participation restrictions that may impact the plan of care Body Regions:   lower extremities    Body Systems:    ROM  strength  balance  gait    Participation Restrictions:   Walking, running, jumping, cutting     Activity limitations:   Learning and applying knowledge  no deficits    General Tasks and Commands  no deficits    Communication  no  deficits    Mobility  walking    Self care  no deficits    Domestic Life  no deficits    Interactions/Relationships  no deficits    Life Areas  no deficits    Community and Social Life  no deficits         moderate   Clinical Presentation evolving clinical presentation with changing clinical characteristics moderate   Decision Making/ Complexity Score: moderate     Goals:  Short Term Goals: 4-6 weeks  1. Pt will be compliant with HEP 50% of prescribed amount.   2. The pt to demo improvement in TCJ mobility b/l to 75% of age-expected mobility   3.  Pt will demo 20 degrees of R hip internal rotation in supine     Long Term Goals: 8-16 weeks   Pt will be compliant with % of prescribed amount.   Patient will improve their FOTO limitation score to at least MCID as evidence of clinically significant improvements in their function.  Pt will demo 5/5 adductor strength on R side   Pt will demo improved single leg squat technique without dynamic navicular drop   The pt will report full participation in ADLs and IADLs without restrictions related to b/l ankles.     Plan   Plan of care Certification: 2/5/2024 to 5/27/24.    Outpatient Physical Therapy 2 times weekly for 16 weeks to include the following interventions: Manual Therapy, Neuromuscular Re-ed, Patient Education, Therapeutic Activities, and Therapeutic Exercise.     Tr Quick, PT , DPT

## 2024-02-09 ENCOUNTER — ATHLETIC TRAINING SESSION (OUTPATIENT)
Dept: SPORTS MEDICINE | Facility: CLINIC | Age: 24
End: 2024-02-09
Payer: COMMERCIAL

## 2024-02-09 DIAGNOSIS — M89.8X6 PAIN OF RIGHT TIBIA: ICD-10-CM

## 2024-02-09 DIAGNOSIS — M25.572 CHRONIC PAIN OF LEFT ANKLE: ICD-10-CM

## 2024-02-09 DIAGNOSIS — G89.29 CHRONIC PAIN OF LEFT ANKLE: ICD-10-CM

## 2024-02-09 NOTE — PROGRESS NOTES
"Subjective:       Chief Complaint: Josue Campos is a 23 y.o. male student at [employer] who had concerns including Pain of the Left Ankle and Pain of the Right Lower Leg.    Handedness: right-handed  Sport played: basketball      Level: college            Pain        Review of Systems   All other systems reviewed and are negative.                Objective:       General: Josue is well-developed, well-nourished, appears stated age, in no acute distress, alert and oriented to time, place and person.     AT Session          Assessment:     Status: AT - Cleared to Exert    Date of Injury:  Tibia: 1/13/2024   Left ankle: 1/20/2024    Date Out:  N/A    Date Cleared:  N/A      Plan:     -------------------------------------------------------------------------------------  ---------------------------------------------------------------------------------------    [facility]  Date:2/9/2024    Subjective: Pt stated he re sprained his ankle. KT tape was applied to prevent swelling    Objective: Minimal swelling, no ecchymosis or deformity present.     Josue completed therapeutic exercises to develop strength       Exercise Reps/Sets/Time Weight #   Foam roll calves 5mins    3 way heel & Toe walks x3    3 way Soleus Raises  3x10    3 way towel scrunch 3x3    Pivot Taps on towel 3x10    SL towel Squats  3x5    3 way calf stretch 3x30"         SL arch crunch 3x30" each leg    SL squat w/ arch crunch 1x10 each leg    Active Joint mobs w/ oscillations kneeling/sitting 1x20                  Josue received the following  modalities and/or manual therapy techniques:      Modality/Manual Therapy Time   Manual ankle joint mobs A to P         Estim Compex pain relief ankle tibia 15mins                        Anna Blanchard, MUMTAZT, LAT, ATC    [facility] [place]    [facility]     [email or web address]    ---------------------------------------------------------------------------------------  ---------------------------------------------------------------------------------------  ---------------------------------------------------------------------------------------  ---------------------------------------------------------------------------------------    [facility] [place]  [facility]  Date:2/6/2024    Subjective: Pt stated he is doing okay but still has ankle pain    Josue completed therapeutic exercises to develop strength       Exercise Reps/Sets/Time Weight #   Foam roll calves 5mins    3 way heel & Toe walks x3    3 way Soleus Raises  3x10    3 way towel scrunch 3x3    Pivot Taps on towel 3x10    SL towel Squats  3x5    3 way calf stretch 3x30"         SL arch crunch 3x30" each leg    SL squat w/ arch crunch 1x10 each leg    Active Joint mobs w/ oscillations kneeling/sitting 1x20                  Josue received the following  modalities and/or manual therapy techniques:      Modality/Manual Therapy Time   Manual ankle joint mobs A to P         Estim Compex pain relief ankle 15mins                        Anna Blanchard, SHAZIA, LAT, ATC    [facility]     [email or web address]   [email or web address]    --------------------------------------------------------------------------------------  ---------------------------------------------------------------------------------------        "

## 2024-02-12 ENCOUNTER — CLINICAL SUPPORT (OUTPATIENT)
Dept: REHABILITATION | Facility: OTHER | Age: 24
End: 2024-02-12
Payer: COMMERCIAL

## 2024-02-12 DIAGNOSIS — M25.671 ANKLE STIFFNESS, RIGHT: Primary | ICD-10-CM

## 2024-02-12 DIAGNOSIS — R52 PAIN AGGRAVATED BY LIFTING: ICD-10-CM

## 2024-02-12 PROCEDURE — 97140 MANUAL THERAPY 1/> REGIONS: CPT | Mod: PN

## 2024-02-12 PROCEDURE — 97112 NEUROMUSCULAR REEDUCATION: CPT | Mod: PN

## 2024-02-12 PROCEDURE — 97530 THERAPEUTIC ACTIVITIES: CPT | Mod: PN

## 2024-02-12 NOTE — PROGRESS NOTES
[facility]   Physical Therapy Treatment Note     Name: Josue Campos  [facility] Number: [id number]    Therapy Diagnosis:   Encounter Diagnoses   Name Primary?    Ankle stiffness, right Yes    Pain aggravated by lifting      Physician: Rosa Isela Gtz DO    Visit Date: 2/12/2024    Physician Orders: PT Eval and Treat   Medical Diagnosis from Referral: Impingement of left ankle joint [M25.872], Instability of ankle, unspecified laterality [M25.373], Pain of right tibia [M89.8X6]   Evaluation Date: 2/5/2024d  Authorization Period Expiration: 1/30/25  Plan of Care Expiration: 5/27/24  Visit # / Visits authorized: 1 / 20   FOTO Follow Up:   FOTO Follow UP:      Precautions: Standard     Time In: 1:30 PM   Time Out: 2:30 PM    Total Appointment Time (timed & untimed codes): 60 minutes    FOTO 1st:   FOTO 3rd:  FOTO 10th:      SUBJECTIVE     Pt reports: played over the weekend. R shin feels about the same, L ankle hurt the first game of the weekend but felt somewhat better the second game but still with some pain. Has vague anterior L hip pain after reported hip flexor strain but not overly irritable.    He was compliant with home exercise program.  Response to previous treatment: improved ankle ROM   Functional change: too early     Pain: 4/10  Location: bilateral ankles     OBJECTIVE     Objective Measures updated at progress report unless specified.     Treatment       Josue received the treatments listed below:      Therapeutic exercises to develop strength, endurance, ROM, and flexibility for 00 minutes including:      Manual therapy techniques: Joint mobilizations were applied to the: ankles and hips for 24 minutes, including:  Assessment   TCJ Distraction HVLA   STJ whip L HVLA   TCJ AP and PA glide grade 2-3 b/l   Hip long axis distraction HVLA b/l   Belted hip inferior glide and lateral distraction grade 2-3  Posterior hip mob grade 2-3 R   ER/IR hip MET b/l     Therapeutic activities  "to improve functional performance for 18  minutes, including:  Walking lunge cue for DF control and active foot 2 x 20 yards   Lateral slider lunge 2 x 8 per side     Neuromuscular re-education activities to improve: Balance, Coordination, Kinesthetic, Sense, and Proprioception for 18 minutes. The following activities were included:  Step up + band raise 2 x 8 per leg @ 12"   Fitter board 2 x 15 per side b/l each direction   Pt education   Half Kneeling Hip flexor stretch + glute contraction 2 x 10 x :05 per side         Patient Education and Home Exercises     Home Exercises Provided and Patient Education Provided     Education provided:   - warmup/prep routine before practice/games     Written Home Exercises Provided: yes. Exercises were reviewed and Josue was able to demonstrate them prior to the end of the session.  Josue demonstrated good  understanding of the education provided. See EMR under Patient Instructions for exercises provided during therapy sessions    ASSESSMENT     Josue with continued anterior R shin pain and slowly improving L ankle pain. Some s/s of posterior impingement b/l that dissipated on the L and improved on the R without full resolution. Still with vague complaints of anterior hip pain secondary to hip flexor strain. Education provided regarding ankle ROM and hip ROM and strengthening before games.     Josue Is progressing well towards his goals.     Pt prognosis is Fair.     Pt will continue to benefit from skilled outpatient physical therapy to address the deficits listed in the problem list box on initial evaluation, provide pt/family education and to maximize pt's level of independence in the home and community environment.     Pt's spiritual, cultural and educational needs considered and pt agreeable to plan of care and goals.     Anticipated barriers to physical therapy: school and sport schedule    Goals:   Short Term Goals: 4-6 weeks  1. Pt will be compliant with HEP 50% " of prescribed amount.   2. The pt to demo improvement in TCJ mobility b/l to 75% of age-expected mobility   3.  Pt will demo 20 degrees of R hip internal rotation in supine      Long Term Goals: 8-16 weeks   Pt will be compliant with % of prescribed amount.   Patient will improve their FOTO limitation score to at least MCID as evidence of clinically significant improvements in their function.  Pt will demo 5/5 adductor strength on R side   Pt will demo improved single leg squat technique without dynamic navicular drop   The pt will report full participation in ADLs and IADLs without restrictions related to b/l ankles.     PLAN     Improve ankle joint mobility and ROM, strength, improve hip joint mobility and ROM, strength    Tr Quick, PT PT, DPT

## 2024-02-16 ENCOUNTER — CLINICAL SUPPORT (OUTPATIENT)
Dept: REHABILITATION | Facility: OTHER | Age: 24
End: 2024-02-16
Payer: COMMERCIAL

## 2024-02-16 DIAGNOSIS — R52 PAIN AGGRAVATED BY LIFTING: ICD-10-CM

## 2024-02-16 DIAGNOSIS — M25.671 ANKLE STIFFNESS, RIGHT: Primary | ICD-10-CM

## 2024-02-16 PROCEDURE — 97112 NEUROMUSCULAR REEDUCATION: CPT | Mod: PN

## 2024-02-16 PROCEDURE — 97530 THERAPEUTIC ACTIVITIES: CPT | Mod: PN

## 2024-02-16 PROCEDURE — 97140 MANUAL THERAPY 1/> REGIONS: CPT | Mod: PN

## 2024-02-19 ENCOUNTER — CLINICAL SUPPORT (OUTPATIENT)
Dept: REHABILITATION | Facility: OTHER | Age: 24
End: 2024-02-19
Payer: COMMERCIAL

## 2024-02-19 DIAGNOSIS — R52 PAIN AGGRAVATED BY LIFTING: ICD-10-CM

## 2024-02-19 DIAGNOSIS — M25.671 ANKLE STIFFNESS, RIGHT: Primary | ICD-10-CM

## 2024-02-19 PROCEDURE — 97112 NEUROMUSCULAR REEDUCATION: CPT | Mod: PN

## 2024-02-19 PROCEDURE — 97530 THERAPEUTIC ACTIVITIES: CPT | Mod: PN

## 2024-02-19 PROCEDURE — 97140 MANUAL THERAPY 1/> REGIONS: CPT | Mod: PN

## 2024-02-19 NOTE — PROGRESS NOTES
[facility]   Physical Therapy Treatment Note     Name: Josue Campos  [facility] Number: [id number]    Therapy Diagnosis:   Encounter Diagnoses   Name Primary?    Ankle stiffness, right Yes    Pain aggravated by lifting      Physician: Rosa Isela Gtz DO    Visit Date: 2/19/2024    Physician Orders: PT Eval and Treat   Medical Diagnosis from Referral: Impingement of left ankle joint [M25.872], Instability of ankle, unspecified laterality [M25.373], Pain of right tibia [M89.8X6]   Evaluation Date: 2/5/2024d  Authorization Period Expiration: 1/30/25  Plan of Care Expiration: 5/27/24  Visit # / Visits authorized: 3 / 20   FOTO Follow Up:   FOTO Follow UP:      Precautions: Standard     Time In: 1:30 PM   Time Out: 2:30 PM    Total Appointment Time (timed & untimed codes): 60 minutes    FOTO 1st:   FOTO 3rd:  FOTO 10th:      SUBJECTIVE     Pt reports: Back has been sore, got hit in the iliac crest while playing Saturday and is sore there. Mostly wants to focus on continued R shin and L ankle pain.     He was compliant with home exercise program.  Response to previous treatment: improved ankle ROM   Functional change: too early     Pain: 4/10  Location: bilateral ankles     OBJECTIVE     Objective Measures updated at progress report unless specified.     Treatment       Josue received the treatments listed below:      Therapeutic exercises to develop strength, endurance, ROM, and flexibility for 00 minutes including:      Manual therapy techniques: Joint mobilizations were applied to the: ankles and hips for 24 minutes, including:  Assessment   TCJ distraction manip grade V  TCJ AP and PA grade 2-3   Prone STJ distraction grade 2-3  STJ whip grade V     Therapeutic activities to improve functional performance for 18  minutes, including:  RFESS iso + medial band tension 3 x :30 with ball toss per side  Slider Y Balance 3 x 8 L side   Pt education     Neuromuscular re-education activities to  improve: Balance, Coordination, Kinesthetic, Sense, and Proprioception for 18 minutes. The following activities were included:  Standing BAPS 2 x 10/10 AP/ML + UE   Fitter board circles 2 x 15/15 per direction   Mobo Board Raise 3 x 10 per side YTB       Patient Education and Home Exercises     Home Exercises Provided and Patient Education Provided     Education provided:   - warmup/prep routine before practice/games     Written Home Exercises Provided: yes. Exercises were reviewed and Josue was able to demonstrate them prior to the end of the session.  Josue demonstrated good  understanding of the education provided. See EMR under Patient Instructions for exercises provided during therapy sessions    ASSESSMENT     Josue continues to demonstrate s/s consistent with an active stress reaction/stress fracture of the anterior tibial cortex on the R side. We again discussed today that this is unlikely to improve and that surgical intervention of an intramedullary peggy is likely warranted and that he is risking further injury by playing on this injury. He again reiterated his understanding of the situation and that he wants to continue playing. His L ankle continues to demonstrate relatively high irritability that is concerning for medial malleolus and navicular bone bruise and potential fracture. This was also discussed with the patient and Dr. Gtz and we will continue to monitor over the next few weeks. As the season is likely to end in the next week and a half, we discussed a formal discussion with the sports medicine team regarding the best way to proceed with his stress fracture for his athletic future.     Josue Is progressing well towards his goals.     Pt prognosis is Fair.     Pt will continue to benefit from skilled outpatient physical therapy to address the deficits listed in the problem list box on initial evaluation, provide pt/family education and to maximize pt's level of independence in the home  and community environment.     Pt's spiritual, cultural and educational needs considered and pt agreeable to plan of care and goals.     Anticipated barriers to physical therapy: school and sport schedule    Goals:   Short Term Goals: 4-6 weeks  1. Pt will be compliant with HEP 50% of prescribed amount.   2. The pt to demo improvement in TCJ mobility b/l to 75% of age-expected mobility   3.  Pt will demo 20 degrees of R hip internal rotation in supine      Long Term Goals: 8-16 weeks   Pt will be compliant with % of prescribed amount.   Patient will improve their FOTO limitation score to at least MCID as evidence of clinically significant improvements in their function.  Pt will demo 5/5 adductor strength on R side   Pt will demo improved single leg squat technique without dynamic navicular drop   The pt will report full participation in ADLs and IADLs without restrictions related to b/l ankles.     PLAN     Improve ankle joint mobility and ROM, strength, improve hip joint mobility and ROM, strength    Tr Quick, PT PT, DPT

## 2024-02-19 NOTE — PROGRESS NOTES
[facility]   Physical Therapy Treatment Note     Name: Josue Campos  [facility] Number: [id number]    Therapy Diagnosis:   Encounter Diagnoses   Name Primary?    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 He also complains of continued L ankle pain that is not improving and is tender in the same spot. His L hip flexor is a little tight and his back has been sore.     He was compliant with home exercise program.  Response to previous treatment: improved ankle ROM   Functional change: too early     Pain: 4/10  Location: bilateral ankles     OBJECTIVE     Objective Measures updated at progress report unless specified.     Treatment       Josue received the treatments listed below:      Therapeutic exercises to develop strength, endurance, ROM, and flexibility for 00 minutes including:      Manual therapy techniques: Joint mobilizations were applied to the: ankles and hips for 24 minutes, including:  Assessment   TCJ distraction manip grade V  TCJ AP and PA grade 2-3   Prone STJ distraction grade 2-3  Sidelying lumbar gapping grade V  Supine TLJ grade V   Supine thoracic grade V     Therapeutic activities to improve functional performance for 18  minutes, including:  Single leg hip  extension Shuttle 3 x 10 @ 1.5 cords   Forward lean hip flexor march 3 x 10 @ 10#   Sled push 2 x LOT + 25#     Neuromuscular re-education activities to improve: Balance, Coordination, Kinesthetic, Sense, and Proprioception for 18 minutes. The following activities were included:  Paloff Press 3 x 10 per side 13# cable   Alternating birddog 3 x 20 (10 per side)           Patient Education and Home Exercises     Home Exercises Provided and Patient Education Provided     Education provided:   - warmup/prep routine before practice/games     Written Home Exercises Provided: yes. Exercises were reviewed and Josue was able to demonstrate them prior to the end of the session.  Josue demonstrated good  understanding of the education provided. See EMR under Patient Instructions for exercises provided during therapy sessions    ASSESSMENT     Josue continues to demonstrate s/s consistent with an active stress reaction/stress fracture of the anterior tibial cortex on the R side. We again discussed today that this is unlikely to improve and that surgical intervention of an intramedullary peggy is likely warranted and that he is risking further injury by playing on this injury. He again reiterated his understanding of the situation and that he wants to continue playing. His L ankle continues to demonstrate relatively high irritability that is concerning for medial malleolus and navicular bone bruise and potential fracture. This was also discussed with the patient and Dr. Gtz and we will continue to monitor over the next few weeks. Back symptoms improved today by manual interventions and core stability exercises.     Josue Is progressing well towards his goals.     Pt prognosis is Fair.     Pt will continue to benefit from skilled outpatient physical therapy to address the deficits listed in the problem list box on initial evaluation, provide pt/family education and to maximize pt's level of independence in the home and  community environment.     Pt's spiritual, cultural and educational needs considered and pt agreeable to plan of care and goals.     Anticipated barriers to physical therapy: school and sport schedule    Goals:   Short Term Goals: 4-6 weeks  1. Pt will be compliant with HEP 50% of prescribed amount.   2. The pt to demo improvement in TCJ mobility b/l to 75% of age-expected mobility   3.  Pt will demo 20 degrees of R hip internal rotation in supine      Long Term Goals: 8-16 weeks   Pt will be compliant with % of prescribed amount.   Patient will improve their FOTO limitation score to at least MCID as evidence of clinically significant improvements in their function.  Pt will demo 5/5 adductor strength on R side   Pt will demo improved single leg squat technique without dynamic navicular drop   The pt will report full participation in ADLs and IADLs without restrictions related to b/l ankles.     PLAN     Improve ankle joint mobility and ROM, strength, improve hip joint mobility and ROM, strength    Tr Quick, PT PT, DPT

## 2024-02-22 ENCOUNTER — OFFICE VISIT (OUTPATIENT)
Dept: SPORTS MEDICINE | Facility: CLINIC | Age: 24
End: 2024-02-22
Payer: COMMERCIAL

## 2024-02-22 ENCOUNTER — APPOINTMENT (OUTPATIENT)
Dept: RADIOLOGY | Facility: OTHER | Age: 24
End: 2024-02-22
Attending: ORTHOPAEDIC SURGERY
Payer: COMMERCIAL

## 2024-02-22 VITALS
HEIGHT: 77 IN | WEIGHT: 210 LBS | SYSTOLIC BLOOD PRESSURE: 137 MMHG | BODY MASS INDEX: 24.79 KG/M2 | DIASTOLIC BLOOD PRESSURE: 79 MMHG

## 2024-02-22 DIAGNOSIS — S80.12XA CONTUSION OF LEFT LOWER EXTREMITY, INITIAL ENCOUNTER: ICD-10-CM

## 2024-02-22 DIAGNOSIS — M79.662 PAIN IN LEFT LOWER LEG: Primary | ICD-10-CM

## 2024-02-22 DIAGNOSIS — M89.8X6 PAIN IN LEFT TIBIA: Primary | ICD-10-CM

## 2024-02-22 DIAGNOSIS — M89.8X6 PAIN IN LEFT TIBIA: ICD-10-CM

## 2024-02-22 PROCEDURE — 99214 OFFICE O/P EST MOD 30 MIN: CPT | Mod: S$GLB,,, | Performed by: ORTHOPAEDIC SURGERY

## 2024-02-22 PROCEDURE — 73590 X-RAY EXAM OF LOWER LEG: CPT | Mod: TC,PN,LT

## 2024-02-22 PROCEDURE — 3075F SYST BP GE 130 - 139MM HG: CPT | Mod: CPTII,S$GLB,, | Performed by: ORTHOPAEDIC SURGERY

## 2024-02-22 PROCEDURE — 3008F BODY MASS INDEX DOCD: CPT | Mod: CPTII,S$GLB,, | Performed by: ORTHOPAEDIC SURGERY

## 2024-02-22 PROCEDURE — 1159F MED LIST DOCD IN RCRD: CPT | Mod: CPTII,S$GLB,, | Performed by: ORTHOPAEDIC SURGERY

## 2024-02-22 PROCEDURE — 3078F DIAST BP <80 MM HG: CPT | Mod: CPTII,S$GLB,, | Performed by: ORTHOPAEDIC SURGERY

## 2024-02-22 PROCEDURE — 1160F RVW MEDS BY RX/DR IN RCRD: CPT | Mod: CPTII,S$GLB,, | Performed by: ORTHOPAEDIC SURGERY

## 2024-02-22 PROCEDURE — 73590 X-RAY EXAM OF LOWER LEG: CPT | Mod: 26,LT,, | Performed by: RADIOLOGY

## 2024-02-22 PROCEDURE — 99999 PR PBB SHADOW E&M-EST. PATIENT-LVL III: CPT | Mod: PBBFAC,,, | Performed by: ORTHOPAEDIC SURGERY

## 2024-02-22 RX ORDER — MELOXICAM 15 MG/1
15 TABLET ORAL DAILY
Qty: 30 TABLET | Refills: 0 | Status: SHIPPED | OUTPATIENT
Start: 2024-02-22

## 2024-02-22 NOTE — PROGRESS NOTES
"CC: left lower leg pain     23 y.o. Male presents today for evaluation of his left lower leg pain. He admits to left proximal anterior lower leg pain beginning this morning in practice when he states he took a knee to the shin while jumping and trying to get a ball. He endorses immediate pain and gradual onset of swelling where contact was made. He feels as though he has been appreciating numbness towards his foot, and lower extremity weakness since the injury.   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 No pertinent past medical history.    PAST SURGICAL HISTORY:   History reviewed. No pertinent surgical history.    FAMILY HISTORY:   History reviewed. No pertinent family history.    SOCIAL HISTORY:   Social History     Socioeconomic History    Marital status: Single       MEDICATIONS:     Current Outpatient Medications:     meloxicam (MOBIC) 15 MG tablet, Take 1 tablet (15 mg total) by mouth once daily., Disp: 30 tablet, Rfl: 0    ALLERGIES:   Review of patient's allergies indicates:  No Known Allergies     PHYSICAL EXAMINATION:  /79   Ht 6' 5" (1.956 m)   Wt 95.3 kg (210 lb)   BMI 24.90 kg/m²   Vitals signs and nursing note have been reviewed.  General: In no acute distress, well developed, well nourished, no diaphoresis  Eyes: EOM full and smooth, no eye redness or discharge  HENT: normocephalic and atraumatic, neck supple, trachea midline, no nasal discharge, no external ear redness or discharge  Cardiovascular: 2+ and symmetric DP pulses bilaterally, no LE edema  Lungs: respirations non-labored, no conversational dyspnea   Abd: non-distended, no " rigidity  MSK: no amputation or deformity, no swelling of extremities  Neuro: AAOx3, CN2-12 grossly intact  Skin: No rashes, warm and dry  Psychiatric: cooperative, pleasant, mood and affect appropriate for age    MUSCULOSKELETAL EXAM:    Left lower leg:  Affected side is compared to contralateral knee     Observation:  No erythema, ecchymosis, or effusion noted.  No muscle atrophy of the thighs and calves noted.  No obvious bony deformities noted.   No genu valgus/varum noted. No recurvatum noted.    No tibial internal/external torsion.    No pes planus/cavus.  + area of soft tissue swelling over proximal tibia     Tenderness:   Patella - none    Lateral joint line - none  Quad tendon - none   Medial joint line - none  Patellar tendon - none  Medial plica - none  Tibial tubercle - none   Lateral plica - none  Pes anserine - none   MCL prox - none  Distal ITB - none   MCL distal - none  MFC - none    LCL prox - none  LFC - none    LCL distal - none  Tibia - positive, medial  Fibula - none    No obvious bursae, plicae, popliteal cysts, or tendon derangement palpated.          ROM:  Active extension to 0° on left without hyperextension, lag, crepitus, or patellar J sign.   Active extension to 0° on right without hyperextension, lag, crepitus, or patellar J sign.   Active flexion to 135° on left and 135° on right.    Strength: (bilaterally) (*pain)  Knee Flexion - 5/5 on left and 5/5 on right  Knee Extension - 5/5 on left and 5/5 on right  Hip Flexion - 5/5 on left and 5/5 on right  Hip Extension - 5/5 on left and 5/5 on right  Ankle dorsiflexion - 5/5 on left and 5/5 on right  Ankle Plantarflexion - 5/5 on left and 5/5 on right    IMAGIN. X-ray ordered due to left lower leg pain   2. X-ray images were reviewed personally by me and then directly with patient.  3. FINDINGS: X-ray images obtained demonstrate no fracture or dislocation.   4. IMPRESSION: As above.       ASSESSMENT:      ICD-10-CM ICD-9-CM   1. Pain in  left lower leg  M79.662 729.5   2. Contusion of left lower extremity, initial encounter  S80.12XA 924.5         PLAN:  1-2. Left leg pain/contusion - new injury to physician    - Josue admits to left lower leg pain beginning earlier this morning in practice when he states he took a knee to the leg while going for a ball. He was evaluated by his ATC and because of his tenderness and symptoms, there was concern for fracture.    - XRs ordered in the office today and images were personally reviewed with the patient. See above for further detail.    - Ultrasound was placed on the area of swelling and demonstrates no hematoma. No cortical disruption either. Soft tissue contusion/edema confirmed.    - Symptoms, exam, and imaging are most consistent with soft tissue contusion of the left lower leg.      - Mobic 15 mg to be taken daily for 2 weeks followed by as needed.     - Tylenol as needed for pain relief as well.    - Advised compression to the affected area, and ok to continue with crutches at this time.     - Contact has been made with their  who is on board with the plan.       Future planning includes -     All questions were answered to the best of my ability and all concerns were addressed at this time.    Follow up as needed.       This note is dictated using the M*Modal Fluency Direct word recognition program. There are word recognition mistakes that are occasionally missed on review.

## 2024-02-26 ENCOUNTER — ATHLETIC TRAINING SESSION (OUTPATIENT)
Dept: SPORTS MEDICINE | Facility: CLINIC | Age: 24
End: 2024-02-26
Payer: COMMERCIAL

## 2024-02-26 ENCOUNTER — CLINICAL SUPPORT (OUTPATIENT)
Dept: REHABILITATION | Facility: OTHER | Age: 24
End: 2024-02-26
Payer: COMMERCIAL

## 2024-02-26 DIAGNOSIS — M25.671 ANKLE STIFFNESS, RIGHT: Primary | ICD-10-CM

## 2024-02-26 DIAGNOSIS — M89.8X6 PAIN IN LEFT TIBIA: Primary | ICD-10-CM

## 2024-02-26 DIAGNOSIS — R52 PAIN AGGRAVATED BY LIFTING: ICD-10-CM

## 2024-02-26 PROCEDURE — 97112 NEUROMUSCULAR REEDUCATION: CPT | Mod: PN

## 2024-02-26 PROCEDURE — 97140 MANUAL THERAPY 1/> REGIONS: CPT | Mod: PN
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
## 2024-03-04 ENCOUNTER — CLINICAL SUPPORT (OUTPATIENT)
Dept: REHABILITATION | Facility: OTHER | Age: 24
End: 2024-03-04
Payer: COMMERCIAL

## 2024-03-04 DIAGNOSIS — M25.671 ANKLE STIFFNESS, RIGHT: Primary | ICD-10-CM

## 2024-03-04 DIAGNOSIS — R52 PAIN AGGRAVATED BY LIFTING: ICD-10-CM

## 2024-03-04 PROCEDURE — 97112 NEUROMUSCULAR REEDUCATION: CPT | Mod: PN

## 2024-03-04 PROCEDURE — 97140 MANUAL THERAPY 1/> REGIONS: CPT | Mod: PN

## 2024-03-04 NOTE — PROGRESS NOTES
[facility]   Physical Therapy Treatment Note     Name: Josue Campos  [facility] Number: [id number]    Therapy Diagnosis:   Encounter Diagnoses   Name Primary?    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 Has [employer] in two weeks.     He was compliant with home exercise program.  Response to previous treatment: improved ankle ROM   Functional change: too early     Pain: 4/10  Location: bilateral ankles     OBJECTIVE     3/4/24  HHD isometric strength   Adduction  R: 31.3/31.2 lb.   L: 40.5/42.7 lb.     Abduction  R: 39.6/44.5 lb.   L: 46.1/43.5 lb.     Area TTP   31 cm x 6 cm over adductor longus       Treatment       Josue received the treatments listed below:      Therapeutic exercises to develop strength, endurance, ROM, and flexibility for 00 minutes including:      Manual therapy techniques: Joint mobilizations were applied to the: ankles and hips for 15 minutes, including:  Assessment   Hip inferior glide grade 2-3   Hip posterior glide grade 2-3   Hip ER/IR MET    Therapeutic activities to improve functional performance for 00  minutes, including:    Neuromuscular re-education activities to improve: Balance, Coordination, Kinesthetic, Sense, and Proprioception for 45  minutes. The following activities were included:  Supine Adductor squeeze 10 x :10   Adductor squeeze + bridge x 20   Lateral band walk GTB 3 x 10 per side  Seated Hamstring Curl 55# 3 x 8 @ 33x1 tempo   P[place]off Press + Raise 2 x 10 @ 17# per side  Stool hip intrinsic drill 3 x 15 RTB       Patient Education and Home Exercises     Home Exercises Provided and Patient Education Provided     Education provided:   - warmup/prep routine before practice/games     Written Home Exercises Provided: yes. Exercises were reviewed and Josue was able to demonstrate them prior to the end of the session.  Josue demonstrated good  understanding of the education provided. See EMR under Patient Instructions for exercises provided during therapy sessions    ASSESSMENT     Josue  with s/s consistent with grade 1 Adductor strain. 31 cm x 6 cm length TTP and pain with passive abduction and resisted adduction. Good tolerance to exercises today without more than 3/10 discomfort. Education to continue to wear hip spica tape and to avoid cutting/shuffling/high intensity sprinting exercises at practice at this time. Texted HENOK Hazel with these instructions.     Josue Is progressing well towards his goals.     Pt prognosis is Fair.     Pt will continue to benefit from skilled outpatient physical therapy to address the deficits listed in the problem list box on initial evaluation, provide pt/family education and to maximize pt's level of independence in the home and community environment.     Pt's spiritual, cultural and educational needs considered and pt agreeable to plan of care and goals.     Anticipated barriers to physical therapy: school and sport schedule    Goals:   Short Term Goals: 4-6 weeks  1. Pt will be compliant with HEP 50% of prescribed amount.   2. The pt to demo improvement in TCJ mobility b/l to 75% of age-expected mobility   3.  Pt will demo 20 degrees of R hip internal rotation in supine      Long Term Goals: 8-16  weeks   Pt will be compliant with % of prescribed amount.   Patient will improve their FOTO limitation score to at least MCID as evidence of clinically significant improvements in their function.  Pt will demo 5/5 adductor strength on R side   Pt will demo improved single leg squat technique without dynamic navicular drop   The pt will report full participation in ADLs and IADLs without restrictions related to b/l ankles.     PLAN     Improve ankle joint mobility and ROM, strength, improve hip joint mobility and ROM, strength    Tr Quick, PT PT, DPT

## 2024-03-07 ENCOUNTER — ATHLETIC TRAINING SESSION (OUTPATIENT)
Dept: SPORTS MEDICINE | Facility: CLINIC | Age: 24
End: 2024-03-07

## 2024-03-07 DIAGNOSIS — S76.011D STRAIN OF RIGHT HIP ADDUCTOR MUSCLE, SUBSEQUENT ENCOUNTER: Primary | ICD-10-CM

## 2024-03-07 NOTE — PROGRESS NOTES
Subjective:       Chief Complaint: Josue Campos is a 23 y.o. male student at [employer] who had concerns including Pain of the Left Lower Leg.    Handedness: right-handed  Sport played: basketball      Level: college            Pain  This is a new problem. The current episode started in the past 7 days.       Review of Systems   All other systems reviewed and are negative.                Objective:       General: Josue is well-developed, well-nourished, appears stated age, in no acute distress, alert and oriented to time, place and person.         General Musculoskeletal Exam   Gait: abnormal     Right Ankle/Foot Exam   Right ankle exam is normal.    Left Ankle/Foot Exam     Range of Motion   Ankle Joint  Dorsiflexion:  abnormal Left ankle dorsiflexion: Pain present.  Plantar flexion:  abnormal Left ankle plantar flexion: Pain present.    Subtalar Joint   Inversion:  normal   Eversion:  normal   [place] Test:  normal    Tests   Heel Walk: unable to perform  Tiptoe Walk: unable to perform  Single Heel Rise: unable to perform  Squeeze Test: positive    Other   Sensation: decreased    Comments:  TTP along shaft of tibia.      Muscle Strength   Left Lower Extremity   Anterior tibial:  2/5   Posterior tibial:  2/5   Gastrocsoleus:  2/5     Vascular Exam       Left Pulses  Left DP grade: normal.  Left PT grade: normal.                Assessment:     Status: O - Out    Date Seen:  2/22/2024    Date of Injury:  2/22/2024    Date Out:  2/22/2024    Date Cleared:  TBD      Plan:       1. Pt was unable to apply pressure to left leg. Pt was given crutches to walk around and sent to Dr. Jaxson Gtz for further evaluation and determine if fracture is present. Ice was applied to decrease pain.  2. Physician Referral: yes  3. ED Referral:no  4. Parent/Guardian Notified: No  5. All questions were answered, ath. will contact me for questions or concerns in  the interim.  6.         Eligible to use School Insurance:  Yes

## 2024-03-07 NOTE — PROGRESS NOTES
Subjective:       Chief Complaint: Josue Campos is a 23 y.o. male student at [employer] who had concerns including R Adductor Strain .    Handedness: right-handed  Sport played: basketball      Level: college                Review of Systems   All other systems reviewed and are negative.                Objective:       General: Josue is well-developed, well-nourished, appears stated age, in no acute distress, alert and oriented to time, place and person.     AT Session          Assessment:     Status: AT - Cleared to Exert    Date of Injury:  3/1/2024    Date Out:  N/A    Date Cleared:  N/A      Plan:         [facility] [employer] [facility]       Athletic Training Note     Name: Josue Campos  Clinic Number: [id number]  Sport: Men's Basketball      Notes     3/9/2024  Pt wore adductor hip spica wrap to decrease R adductor pain    3/8/2024  Pt wore adductor hip spica wrap to decrease R adductor pain    3/7/2024  Pt wore adductor hip spica wrap to help decrease adductor pain during practice. Pt will continue to see PT Vic Youngmen in PT clinic for continued rehab to decrease adductor pain as well as right tibia pain and left ankle pain.      Anna Blanchard MSAT, LAT, ATC    [employer] [place] [facility]

## 2024-03-08 ENCOUNTER — CLINICAL SUPPORT (OUTPATIENT)
Dept: REHABILITATION | Facility: OTHER | Age: 24
End: 2024-03-08
Payer: COMMERCIAL

## 2024-03-08 DIAGNOSIS — M25.671 ANKLE STIFFNESS, RIGHT: Primary | ICD-10-CM

## 2024-03-08 DIAGNOSIS — R52 PAIN AGGRAVATED BY LIFTING: ICD-10-CM

## 2024-03-08 PROCEDURE — 97140 MANUAL THERAPY 1/> REGIONS: CPT | Mod: PN

## 2024-03-08 PROCEDURE — 97112 NEUROMUSCULAR REEDUCATION: CPT | Mod: PN

## 2024-03-08 PROCEDURE — 97530 THERAPEUTIC ACTIVITIES: CPT | Mod: PN

## 2024-03-08 NOTE — PROGRESS NOTES
[facility]   Physical Therapy Treatment Note     Name: Josue Campos  [facility] Number: [id number]    Therapy Diagnosis:   Encounter Diagnoses   Name Primary?    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 Now top of R foot hurts.     He was compliant with home exercise program.  Response to previous treatment: improved ankle ROM   Functional change: too early     Pain: 4/10  Location: bilateral ankles     OBJECTIVE     3/8/24  HHD Iso  Adduction  R: 34/39  L: 34/42    Abduction   R: 45/49  L: 49/52     3/4/24  HHD isometric strength   Adduction  R: 31.3/31.2 lb.   L: 40.5/42.7 lb.     Abduction  R: 39.6/44.5 lb.   L: 46.1/43.5 lb.     Area TTP   31 cm x 6 cm over adductor longus       Treatment       Josue received the treatments listed below:      Therapeutic exercises to develop strength, endurance, ROM, and flexibility for 00 minutes including:      Manual therapy techniques: Joint mobilizations were applied to the: ankles and hips for 18 minutes, including:  Assessment   Prone midfoot mob   Supine midfoot mob  Great toe extension   1st ray dorsiflexion mob     Therapeutic activities to improve functional performance for 12  minutes, including:  Skater hop 1 yard 5 x 5 per side  Slider lateral iso 3 x 6 per side in  pain free ROM   Lateral band walk GTB 3 x 10 per direction     Neuromuscular re-education activities to improve: Balance, Coordination, Kinesthetic, Sense, and Proprioception for 30 minutes. The following activities were included:  Band assisted short lever [place] 2 x 10 per side   SL RDL foam roller 2 x 8 per side  Mobo board  Supination RTB  Side plank abd 2 x :30 per side       Patient Education and Home Exercises     Home Exercises Provided and Patient Education Provided     Education provided:   - warmup/prep routine before practice/games     Written Home Exercises Provided: yes. Exercises were reviewed and Josue was able to demonstrate them prior to the end of the session.  Josue demonstrated good  understanding of the education provided. See EMR under Patient Instructions for exercises provided during therapy sessions    ASSESSMENT     Josue  with improved irritability in adductor strain, good tolerance to eccentric addition. S/s c/w plantarflexed 1st ray secondary to new orthotics.     Josue Is progressing well towards his goals.     Pt prognosis is Fair.     Pt will continue to benefit from skilled outpatient physical therapy to address the deficits listed in the problem list box on initial evaluation, provide pt/family education and to maximize pt's level of independence in the home and community environment.     Pt's spiritual, cultural and educational needs considered and pt agreeable to plan of care and goals.     Anticipated barriers to physical therapy: school and sport schedule    Goals:   Short Term Goals: 4-6 weeks  1. Pt will be compliant with HEP 50% of prescribed amount.   2. The pt to demo improvement in TCJ mobility b/l to 75% of age-expected mobility   3.  Pt will demo 20 degrees of R hip internal rotation in supine      Long Term Goals: 8-16 weeks   Pt will be compliant with % of prescribed amount.   Patient will improve their FOTO limitation score to at least MCID as  evidence of clinically significant improvements in their function.  Pt will demo 5/5 adductor strength on R side   Pt will demo improved single leg squat technique without dynamic navicular drop   The pt will report full participation in ADLs and IADLs without restrictions related to b/l ankles.     PLAN     Improve ankle joint mobility and ROM, strength, improve hip joint mobility and ROM, strength    Tr Quick, PT PT, DPT

## 2024-03-12 ENCOUNTER — CLINICAL SUPPORT (OUTPATIENT)
Dept: REHABILITATION | Facility: OTHER | Age: 24
End: 2024-03-12
Payer: COMMERCIAL

## 2024-03-12 DIAGNOSIS — R52 PAIN AGGRAVATED BY LIFTING: ICD-10-CM

## 2024-03-12 DIAGNOSIS — M25.671 ANKLE STIFFNESS, RIGHT: Primary | ICD-10-CM

## 2024-03-12 PROCEDURE — 97530 THERAPEUTIC ACTIVITIES: CPT | Mod: PN

## 2024-03-12 PROCEDURE — 97140 MANUAL THERAPY 1/> REGIONS: CPT | Mod: PN

## 2024-03-12 PROCEDURE — 97112 NEUROMUSCULAR REEDUCATION: CPT | Mod: PN

## 2024-03-13 NOTE — PROGRESS NOTES
[facility]   Physical Therapy Treatment Note     Name: Josue Campos  [facility] Number: [id number]    Therapy Diagnosis:   Encounter Diagnoses   Name Primary?    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 Now top of R foot hurts.     He was compliant with home exercise program.  Response to previous treatment: improved ankle ROM   Functional change: too early     Pain: 4/10  Location: bilateral ankles     OBJECTIVE     3/8/24  HHD Iso  Adduction  R: 34/39  L: 34/42    Abduction   R: 45/49  L: 49/52     3/4/24  HHD isometric strength   Adduction  R: 31.3/31.2 lb.   L: 40.5/42.7 lb.     Abduction  R: 39.6/44.5 lb.   L: 46.1/43.5 lb.     Area TTP   31 cm x 6 cm over adductor longus       Treatment       Josue received the treatments listed below:      Therapeutic exercises to develop strength, endurance, ROM, and flexibility for 00 minutes including:      Manual therapy techniques: Joint mobilizations were applied to the: ankles and hips for 15 minutes, including:  Assessment   Prone midfoot mob   Supine midfoot mob  Great toe extension   1st ray dorsiflexion mob   Supine Thoracic HVLA   Supien CTJ HVLA   C5 HVLA b/l     Therapeutic activities to improve functional performance for 15  minutes, including:  Slideboard 3 x  20 alternating   Lateral shuffle 5-10-5 x 3 per direction   Lateral Lunge 3 x 8 each direction 20#     Neuromuscular re-education activities to improve: Balance, Coordination, Kinesthetic, Sense, and Proprioception for 15 minutes. The following activities were included:  Side Plank 2 x :40 per side  Adductor lift 2 x 8 per side short lever   Adductor isometric 10 x :10       Patient Education and Home Exercises     Home Exercises Provided and Patient Education Provided     Education provided:   - warmup/prep routine before practice/games     Written Home Exercises Provided: yes. Exercises were reviewed and Josue was able to demonstrate them prior to the end of the session.  Josue demonstrated good  understanding of the education provided. See EMR under Patient Instructions for exercises provided during therapy sessions    ASSESSMENT     Josue  with improved irritability in adductor strain, good tolerance to eccentric addition. S/s c/w plantarflexed 1st ray secondary to new orthotics.     Josue Is progressing well towards his goals.     Pt prognosis is Fair.     Pt will continue to benefit from skilled outpatient physical therapy to address the deficits listed in the problem list box on initial evaluation, provide pt/family education and to maximize pt's level of independence in the home and community environment.     Pt's spiritual, cultural and educational needs considered and pt agreeable to plan of care and goals.     Anticipated barriers to physical therapy: school and sport schedule    Goals:   Short Term Goals: 4-6 weeks  1. Pt will be compliant with HEP 50% of prescribed amount.   2. The pt to demo improvement in TCJ mobility b/l to 75% of age-expected mobility   3.  Pt will demo 20 degrees of R hip internal rotation in supine      Long Term Goals: 8-16 weeks   Pt will be compliant with % of prescribed amount.   Patient will improve their FOTO limitation score to at least MCID as evidence of  clinically significant improvements in their function.  Pt will demo 5/5 adductor strength on R side   Pt will demo improved single leg squat technique without dynamic navicular drop   The pt will report full participation in ADLs and IADLs without restrictions related to b/l ankles.     PLAN     Improve ankle joint mobility and ROM, strength, improve hip joint mobility and ROM, strength    Tr Quick, PT PT, DPT

## 2024-03-14 ENCOUNTER — ATHLETIC TRAINING SESSION (OUTPATIENT)
Dept: SPORTS MEDICINE | Facility: CLINIC | Age: 24
End: 2024-03-14

## 2024-03-14 DIAGNOSIS — M79.671 RIGHT FOOT PAIN: Primary | ICD-10-CM

## 2024-03-14 NOTE — PROGRESS NOTES
Subjective:       Chief Complaint: Josue Campos is a 23 y.o. male student at [employer] who had concerns including Pain of the Right Foot.    Pt informed me on 3/12/24 that his right foot has been hurting since he changed insoles. He was informed to change back to other insoles by PT. Pt stated he has still had pain even after changing insoles. No ecchymosis, deformity or swelling present. No numbness or tinging present. PMH: no previous foot pain only ankle.     Handedness: right-handed  Sport played: basketball      Level: college            Pain  This is a new problem. The current episode started 1 to 4 weeks ago. The problem has been gradually worsening. Exacerbated by: running/sprinting. He has tried acetaminophen, ice, NSAIDs and rest for the symptoms. The treatment provided mild (Mild relief with no activity but pain returns with activity) relief.       Review of Systems   All other systems reviewed and are negative.                Objective:       General: Josue is well-developed, well-nourished, appears stated age, in no acute distress, alert and oriented to time, place and person.         General Musculoskeletal Exam   Gait: normal     Right Ankle/Foot Exam     Inspection   Deformity: absent  Bruising:  Foot - absent  Effusion:  Foot - absent    Range of Motion   The patient has normal right ankle ROM.    Tests   Squeeze Test: negative    Other   Sensation: normal    Comments:  TTP Medial Cuneiform    Left Ankle/Foot Exam   Left ankle exam is normal.      Reflexes     Right Side   Hannah Sign: absent    ANKLE: right   The affected ankle is compared to the contralateral ankle.    Observation:    There is no edema, erythema, or ecchymosis.   Shoes reveal a normal wear pattern.  No structural deformities including pes planus/cavus, hallux valgus, or toe deformities.   Negative too-many toes sign.  Normal callus pattern on the feet bilaterally.    Achilles tendon and calcaneal insertion reveals no  deformities  No leg or intrinsic foot muscle atrophy.  Squatting reveals symmetric pronation of the bilateral feet.   Able to rise on toes with symmetric supination.    Normal gait without evidence of antalgia.    ROM: (expected degree)  Active dorsiflexion to 20° bilaterally.    Active plantarflexion to 50° bilaterally.    Active ankle inversion to 35° bilaterally.    Active ankle eversion to 15° bilaterally.    Full active flexion/extension of the toes bilaterally.   Heel cords without tightness bilaterally.    Tenderness To Palpation:  No tenderness at the ATFL, CFL, PTFL, or deltoid ligaments  No tenderness over the distal anterior syndesmosis, distal tibia/fibula, fibular head/shaft  No tenderness at medial or lateral malleoli   No tenderness at navicular, cuboid, cuneiforms, talus, or calcaneous  No tenderness along the metatarsals or phalanges  No tenderness at the Achilles tendon calcaneal insertion  No tenderness at the tibialis posterior, flexor digitorum longus, flexor hallucis longus   No tenderness at the peroneal tendons    Strength Testing:  Dorsiflexion - 5/5  Platarflexion - 5/5  Resisted Inversion - 5/5  Resisted Eversion - 5/5  Great Toe Extension - 5/5  Great Toe Flexion - 5/5    Special Tests:  Anterior talar drawer - negative and without dimpling  Talar tilt - negative    Heel tap test - negative  Distal tib/fib squeeze test - negative  External rotation stress (Kleiger) test - negative  Valdez squeeze test - negative    Metatarsal squeeze test - negative  Midfoot stress test - negative  Calcaneal squeeze test - negative    Tuning fork exam over the Medial Cuniform does not produce pain or discomfort    No subluxation of the peroneal tendons with resisted eversion.    Vascular/Sensory Exam:  DP and PT pulses intact BL  No skin changes, no abnormal hair distribution  Sensation intact to light touch throughout the saphenous, sural, superficial peroneal, deep peroneal, and tibial nerve  distributions  Negative Tinel's test over tarsal tunnel  2+/4 reflexes at L4 and S1 dermatomes  Capillary refill intact <2 seconds in all toes bilaterally.         Assessment:     Status: F - Full Participation    Date Seen:  3/12/2024    Date of Injury:  3/12/2024    Date Out:  N/A    Date Cleared:  N/A      Plan:       1. Tape was applied to top of pt's foot to decrease movement of joint with activity. Pt will continue treatment in ATF and continue to be taped to decrease pain.  2. Physician Referral: no  3. ED Referral:no  4. Parent/Guardian Notified: No  5. All questions were answered, ath. will contact me for questions or concerns in  the interim.  6.         Eligible to use School Insurance: Yes

## 2024-03-18 ENCOUNTER — HOSPITAL ENCOUNTER (OUTPATIENT)
Dept: RADIOLOGY | Facility: OTHER | Age: 24
Discharge: HOME | End: 2024-03-18
Attending: INTERNAL MEDICINE
Payer: COMMERCIAL

## 2024-03-18 DIAGNOSIS — T14.8XXA FRACTURE: ICD-10-CM

## 2024-03-18 DIAGNOSIS — M66.871 NONTRAUMATIC TEAR OF RIGHT TIBIALIS POSTERIOR TENDON: ICD-10-CM

## 2024-03-18 PROCEDURE — 73718 MRI LOWER EXTREMITY W/O DYE: CPT | Mod: 26,RT,, | Performed by: RADIOLOGY

## 2024-03-18 PROCEDURE — 73718 MRI LOWER EXTREMITY W/O DYE: CPT | Mod: TC,RT

## 2024-04-09 ENCOUNTER — HOSPITAL ENCOUNTER (OUTPATIENT)
Dept: RADIOLOGY | Facility: HOSPITAL | Age: 24
Discharge: HOME | End: 2024-04-09
Attending: ORTHOPAEDIC SURGERY
Payer: COMMERCIAL

## 2024-04-09 ENCOUNTER — OFFICE VISIT (OUTPATIENT)
Dept: SPORTS MEDICINE | Facility: CLINIC | Age: 24
End: 2024-04-09
Payer: COMMERCIAL

## 2024-04-09 VITALS
SYSTOLIC BLOOD PRESSURE: 145 MMHG | BODY MASS INDEX: 24.59 KG/M2 | HEIGHT: 77 IN | HEART RATE: 78 BPM | WEIGHT: 208.25 LBS | DIASTOLIC BLOOD PRESSURE: 76 MMHG

## 2024-04-09 DIAGNOSIS — M79.671 RIGHT FOOT PAIN: ICD-10-CM

## 2024-04-09 DIAGNOSIS — M79.642 PAIN IN BOTH HANDS: ICD-10-CM

## 2024-04-09 DIAGNOSIS — S60.10XA SUBUNGUAL HEMATOMA OF DIGIT OF HAND, INITIAL ENCOUNTER: ICD-10-CM

## 2024-04-09 DIAGNOSIS — M79.641 PAIN IN BOTH HANDS: ICD-10-CM

## 2024-04-09 DIAGNOSIS — M79.671 RIGHT FOOT PAIN: Primary | ICD-10-CM

## 2024-04-09 DIAGNOSIS — M79.641 PAIN OF RIGHT HAND: ICD-10-CM

## 2024-04-09 PROCEDURE — 99215 OFFICE O/P EST HI 40 MIN: CPT | Mod: 25,S$GLB,, | Performed by: ORTHOPAEDIC SURGERY

## 2024-04-09 PROCEDURE — 3008F BODY MASS INDEX DOCD: CPT | Mod: CPTII,S$GLB,, | Performed by: ORTHOPAEDIC SURGERY

## 2024-04-09 PROCEDURE — 99999 PR PBB SHADOW E&M-EST. PATIENT-LVL III: CPT | Mod: PBBFAC,,, | Performed by: ORTHOPAEDIC SURGERY

## 2024-04-09 PROCEDURE — 1159F MED LIST DOCD IN RCRD: CPT | Mod: CPTII,S$GLB,, | Performed by: ORTHOPAEDIC SURGERY

## 2024-04-09 PROCEDURE — 73140 X-RAY EXAM OF FINGER(S): CPT | Mod: TC

## 2024-04-09 PROCEDURE — 3077F SYST BP >= 140 MM HG: CPT | Mod: CPTII,S$GLB,, | Performed by: ORTHOPAEDIC SURGERY

## 2024-04-09 PROCEDURE — 73630 X-RAY EXAM OF FOOT: CPT | Mod: 26,RT,, | Performed by: RADIOLOGY

## 2024-04-09 PROCEDURE — 11740 EVACUATION SUBUNGUAL HMTMA: CPT | Mod: F8,S$GLB,, | Performed by: ORTHOPAEDIC SURGERY

## 2024-04-09 PROCEDURE — 73140 X-RAY EXAM OF FINGER(S): CPT | Mod: 26,,, | Performed by: RADIOLOGY

## 2024-04-09 PROCEDURE — 73630 X-RAY EXAM OF FOOT: CPT | Mod: TC,RT

## 2024-04-09 PROCEDURE — 3078F DIAST BP <80 MM HG: CPT | Mod: CPTII,S$GLB,, | Performed by: ORTHOPAEDIC SURGERY

## 2024-04-09 NOTE — PROGRESS NOTES
"CC: right foot and ring finger pain    23 y.o. Male presents today for evaluation of his right foot and right ring finger pain. Pain in the right foot is located over the dorsal surface of the foot, insidious onset, but started hurting after an injury where he had a stress fracture of the right tibia    Pain in the right ring finger pain is located over the nailbed with associated subungual hematoma formation, along the radial and ulnar sides over the PIP and DIP joints.     How long: the right foot pain has been present for years and occasionally flares up. Most recently flared up during this past basketball season but has improved.     The right ring finger started hurting during practice when he was going to [email or web address] and hit his finger on the rim. He kept playing during practice.     Does it radiate: right ringer finger, no  Attempted treatments: attempted ice, but was too painful  Pain score: 0/10 in the right foot; 7/10  History of trauma/injury: stress fracture right tibia, no prior finger trauma  Affecting ADLs: yes  Any mechanical symptoms: no  Feelings of instability: no     Occupation: student, plays basketball at [employer]    PAST MEDICAL HISTORY:   History reviewed. No pertinent past medical history.    PAST SURGICAL HISTORY:   History reviewed. No pertinent surgical history.    FAMILY HISTORY:   History reviewed. No pertinent family history.    SOCIAL HISTORY:   Social History     Socioeconomic History    Marital status: Single       MEDICATIONS:     Current Outpatient Medications:     meloxicam (MOBIC) 15 MG tablet, Take 1 tablet (15 mg total) by mouth once daily., Disp: 30 tablet, Rfl: 0    ALLERGIES:   Review of patient's allergies indicates:  No Known Allergies     PHYSICAL EXAMINATION:  BP (!) 145/76   Pulse 78   Ht 6' 5" (1.956 m)   Wt 94.4 kg (208 lb 3.6 oz)   BMI 24.69 kg/m²   Vitals signs and nursing note have been reviewed.  General: In no acute distress, well developed, well nourished, no " diaphoresis  Eyes: EOM full and smooth, no eye redness or discharge  HENT: normocephalic and atraumatic, neck supple, trachea midline, no nasal discharge, no external ear redness or discharge  Cardiovascular: 2+ and symmetric radial and DP pulses bilaterally, no LE edema  Lungs: respirations non-labored, no conversational dyspnea   Abd: non-distended, no rigidity  MSK: no amputation or deformity, no swelling of extremities  Neuro: AAOx3, CN2-12 grossly intact  Skin: No rashes, warm and dry  Psychiatric: cooperative, pleasant, mood and affect appropriate for age    ANKLE: right   The affected ankle is compared to the contralateral ankle.    Observation:    There is no edema, erythema, or ecchymosis.   Shoes reveal a normal wear pattern.  No structural deformities including pes planus/cavus, hallux valgus, or toe deformities.   Negative too-many toes sign.  Normal callus pattern on the feet bilaterally.    Achilles tendon and calcaneal insertion reveals no deformities  No leg or intrinsic foot muscle atrophy.  Squatting reveals symmetric pronation of the bilateral feet.   Able to rise on toes with symmetric supination.    Normal gait without evidence of antalgia.    ROM: (expected degree)   Active dorsiflexion to 20° bilaterally.    Active plantarflexion to 50° bilaterally.    Active ankle inversion to 35° bilaterally.    Active ankle eversion to 15° bilaterally.    Full active flexion/extension of the toes bilaterally.   Heel cords without tightness bilaterally.    Tenderness To Palpation   No tenderness at the ATFL, CFL, PTFL, or deltoid ligaments  No tenderness over the distal anterior syndesmosis, distal tibia/fibula, fibular head/shaft  No tenderness at medial or lateral malleoli   No tenderness at navicular, cuboid, cuneiforms, talus, or calcaneous  + milder tenderness over the proximal first metatarsal 7  No tenderness along the other metatarsals or phalanges  No tenderness at the Achilles tendon calcaneal  insertion  No tenderness at the tibialis posterior, flexor digitorum longus, flexor hallucis longus   No tenderness at the peroneal tendons    Strength Testing: (*pain)  Dorsiflexion - 5/5  Platarflexion - 5/5  Resisted Inversion - 5/5  Resisted Eversion - 5/5  Great Toe Extension - 5/5  Great Toe Flexion - 5/5    Special Tests:  Anterior talar drawer - negative and without dimpling  Talar tilt - negative    Heel tap test - negative  Distal tib/fib squeeze test - negative  Valdez squeeze test - negative    Metatarsal squeeze test - negative  Midfoot stress test - negative  Calcaneal squeeze test - negative    No subluxation of the peroneal tendons with resisted eversion.    Vascular/Sensory Exam:  DP and PT pulses intact BL  No skin changes, no abnormal hair distribution  Sensation intact to light touch throughout the saphenous, sural, superficial peroneal, deep peroneal, and tibial nerve distributions  Negative Tinel's test over tarsal tunnel  2+/4 reflexes at L4 and S1 dermatomes  Capillary refill intact <2 seconds in all toes bilaterally.    Right finger/wrist exam  Observation  - notable blood blister/hematoma under the nailbed and extending to the fingertip on the right ring finger.    Tenderness:  No tenderness at radial styloid, Julian's tubercle, ulnar styloid.  No tenderness at anatomic snuff box, scaphoid tubercle, scapholunate junction.  No tenderness at TFCC.  + tenderness over the ring finger DIP and PIP  No tenderness at pisiform, hamate, metacarpals and phalanges.  No tenderness over median nerve at the carpal tunnel.    Range of Motion:  Active wrist extension to 70° on left and 70° on right.    Active wrist flexion to 80° on left and 80° on right.    Active radial deviation to 20° on left and 20° on right.    Active ulnar deviation to 30° on left and 30° on right.    Active pronation to 80° on left and 80° on right.    Active supination to 80° on left and 80° on right.    Full active flexion and  extension at the MCP, PIP, and DIP bilaterally.   Active thumb motion full in all planes.    Strength Testing: (*pain)  Wrist extension - 5/5 on left and 5/5 on right  Wrist flexion - 5/5 on left and 5/5 on right  Ulnar deviation - 5/5 on left and 5/5 on right  Radial deviation - 5/5 on left and 5/5 on right  Pronation - 5/5 on left and 5/5 on right  Supination - 5/5 on left and 5/5 on right    Finger flexion - 5/5 on left and 5/5 on right  Finger extension - 5/5 on left and 5/5 on right  Finger abduction - 5/5 on left and 5/5 on right  Finger adduction - 5/5 on left and 5/5 on right    Special Tests:  Phalen's test - negative  Tinel's test at wrist - negative  Carpal compression test - negative    Ulnar compression test - negative  Froment's sign - negative  Tinel's test of Guyon's canal - negative    No laxity with distal radioulnar joint translation.  Negative Hardens test.     No laxity with phalangeal varus/valgus stress testing.    Finkelsteins test - negative    Axial grind of first CMC joint - negative  No laxity at the MCP UCL of the thumb    Normal fist alignment of the phalanges  No laxity at the RCL and UCL of the PIPs and DIPs of the phalanges.  Normal finger flexion of the FDP and FDS in all phalanges bilaterally.  Able to perform OK sign.    Neurovascular Exam:  Sensation intact to light touch in the median, ulnar, and radial distributions on the hands bilaterally.  Radial and ulnar pulses intact and symmetric.  Capillary refill intact to <2 seconds in all digits.     IMAGIN. X-ray ordered due to right foot pain  2. X-ray images were reviewed personally by me and then directly with patient.  3. FINDINGS: X-ray images obtained demonstrate no acute fracture nor other osseous abnormality.  4. IMPRESSION: As above.     1. X-ray ordered due to right ring finger pain  2. X-ray images were reviewed personally by me and then directly with patient.  3. FINDINGS: X-ray images obtained demonstrate no  acute fracture nor other osseous abnormality.  4. IMPRESSION: As above.     2/02/2024 MRI right tibia/fibula   FINDINGS:  There is mild edema of the proximal medial head of the gastrocnemius muscle no rupture or retraction seen.  No marrow replacement, marrow edema, infection, or neoplasm is seen.  No abscess, or fluid collection seen.  Impression:  There is mild edema of the anterior aspect of the medial head of the gastrocnemius muscle.  This is thought to represent a grade 1 strain.  No rupture or retraction seen.    1/07/24 MRI right tibia/fibula  FINDINGS:  Correlating with the radiographic finding of 01/02/2024, is a low level abnormal signal intensity within the anterior cortical margin best identified series 7, image 25.  There is no evidence for periostitis.  Findings are consistent with chronic stress fracture.  Additionally, there is evidence for edema identified level of the medial gastrocnemius/soleus level best identified proximal tibia axial series 11, image 15.  Findings consistent with grade 1 stress injury (versus less likely, sequela of contusion).  Impression:  Chronic stress fracture anterior tibial margin corresponding with radiograph 01/02/2024.  No evidence for intramedullary edema, or periostitis.  Mild focal muscle edema, medial gastrocnemius/soleus level proximally, likely muscle strain.    ASSESSMENT:      ICD-10-CM ICD-9-CM   1. Right foot pain  M79.671 729.5   2. Pain of right hand  M79.641 729.5   3. Subungual hematoma of digit of hand, initial encounter [S60.10XA]  S60.10XA 923.3         PLAN:  1-2. Right finger pain/foot pain -   3. 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 Recently had a flare up where pain deteriorated after a stress fracture acquired to the right tibia. This pain has overall improved and is essentially resolved  "today.    - XRs ordered in the office today and images were personally reviewed with the patient. See above for further detail.    - Prior tib/fib MRIs reviewed personally again today as well.     - He admits to right ring finger pain that began during practice where he impacted the finger against the basketball rim with resulting breakage of the skin and blood blister formation. Xray of the finger was negative for acute fracture.    - Symptoms, exam, and imaging are most consistent with subungual hematoma and ring finger ligamentous sprain, and chronic right foot pain.  We discussed the importance of decreasing inflammation and strengthening and stabilizing to help promote and maintain symptom improvement/resolution.  This is commonly accomplished with a short course of an anti-inflammatory and icing in addition to osteopathic manipulation, a home exercise program or physical therapy. Discussed that the finger pain will be self-resolving and treating with anti-inflammatory means in the meantime would help with pain relief.    - Discussed with Josue today that we could sheba and drain the finger hematoma to relieve pressure on the nail. He was in agreement with this. Finger was cleansed with alcohol and using an 18g 1.5" in needle, finger lesion was lanced and drained. Band-aid applied without issue. Symptom relief appreciated immediately. No immediate complications following the procedure today.      Future planning includes - additional evaluation/imaging for right foot pain if worsens    All questions were answered to the best of my ability and all concerns were addressed at this time.    Follow up in as needed in the future.      This note is dictated using the M*Modal Fluency Direct word recognition program. There are word recognition mistakes that are occasionally missed on review.       Total time spent face-to face with patient counseling or coordinating care including prognosis, differential diagnosis, " risks and benefits of treatment, instructions, compliance risk reductions as well as non-face-to-face time personally spent reviewing medial record, medical documentation, and coordination of care.     EST MINUTES X   [id number] 10-19    [id number] 20-29    [id number] 30-39    07051 40-54 X   NEW     22231 15-29    71799 30-44    [id number] 45-59    [id number] 60-74    PHONE      5-10    [id number] 11-20    [id number] 21-30

## 2024-04-17 ENCOUNTER — ATHLETIC TRAINING SESSION (OUTPATIENT)
Dept: SPORTS MEDICINE | Facility: CLINIC | Age: 24
End: 2024-04-17
Payer: COMMERCIAL

## 2024-04-17 DIAGNOSIS — M89.8X6 PAIN OF RIGHT TIBIA: Primary | ICD-10-CM

## 2024-04-17 NOTE — PROGRESS NOTES
Reason for Encounter Follow-Up    Subjective:       Chief Complaint: Josue Campos is a 23 y.o. male student at [employer] who had concerns including Right Tibia Pain.    Pt stated pain started 3 days ago (4/14/2024) but last night(4/16/2024) pain was really bad. Pt stated pain feels different than previous pain in tibia but is having sharp pain similar to previous pain in tibia but pain is now lower than it has been in the past. No ecchymosis, deformity, swelling present. No numbness or tingling present.     Handedness: right-handed  Sport played: basketball      Level: [employer]                Review of Systems   All other systems reviewed and are negative.                Objective:       General: Josue is well-developed, well-nourished, appears stated age, in no acute distress, alert and oriented to time, place and person.     AT Session          Assessment:     Status: AT - Cleared to Exert    Date Seen:  4/17/2024    Date of Injury:  4/14/2024    Date Out:  4/17/2024    Date Cleared:  N/A      Plan:     ---------------------------------------------------------------------------------------  ---------------------------------------------------------------------------------------    [facility]  Date:4/17/2024    Subjective: Pt sated he is doing better than yesterday night but still in a lot of pain.    Objective: No ecchymosis, deformity or swelling present. TTP on Midshaft of Tibia      Josue received the following  modalities and/or manual therapy techniques:      Modality/Manual Therapy Time   IASTM (Calf/Tibialis) 5mins   Joint mobs ankle DF 3mins                            Anna Blanchard, MUMTAZT, LAT, ATC    [facility] [place]    [facility]     [email or web address]    [email or web address]    ---------------------------------------------------------------------------------------  ---------------------------------------------------------------------------------------       1. Pt will continue treatment in ATF to decrease shin pain. If pain continues pt will be referred to physician for further evaluation.   2. Physician Referral: yes  3. ED Referral:no  4. Parent/Guardian Notified: No  5. All questions were answered, ath. will contact me for questions or concerns in  the interim.  6.         Eligible to use School Insurance: Yes

## 2024-04-29 NOTE — PROGRESS NOTES
Subjective:     Josue Campos     Chief Complaint   Patient presents with    Right Lower Leg - Pain       Follow-up      Josue is a 23 y.o. male coming in today for right shin pain. Since last visit the pain has remained unchanged. Hx of right anterior cortex stress fracture. Pt reports continued pain during basketball season, relieved by rest. The pain returns when he runs or jumps. Pt has been resting for 5 days and has no pain. Pt reports worsening pain over the past month with increased activity. Pt has plans to play over the summer and will return to [employer] next year. The pain is better with rest, PT and worse with jumping, running. Pt. describes the pain as a 0/10 currently,  8/10 at worst achy pain that does radiate. There has not been any new a fall/injury/ or traumas since last visit.  Pt. denies any new musculoskeletal complaints at this time.     Office note from 8/25/23 reviewed    Joint instability? no  Mechanical locking/clicking? no  Affecting ADL's? no  Affecting sleep? 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  PAST SURGICAL HISTORY: No past surgical history on file.  FAMILY HISTORY: No family history on file.  SOCIAL HISTORY:   Social History     Socioeconomic History    Marital status: Single     MEDICATIONS:   Current Outpatient Medications:     meloxicam (MOBIC) 15 MG tablet, Take 1 tablet (15 mg total) by mouth once daily., Disp: 30 tablet, Rfl: 0  ALLERGIES:   Review of patient's allergies indicates:  No Known Allergies    Reviewed office visit on 4/25/23 with Dr. Dirk Hunt : Josue would like to finish up his semester which would seem most reasonable.  I recommended keeping him out of basketball for now and will start him on calcium, vitamin D and discussed pros and cons of a bone stimulator and use of forteo.  They would like to discuss this with an orthopaedic surgeon in the [place]  area as that is where they are from, and I provided them with the contact information for [place]. Kenneth [place] [facility]  I offered to reach out to Dr. Ragland's office to make the connection.    IMAGIN. CT ordered due to right shin pain taken 23.   2. CT images were reviewed personally by me and then directly with patient.  3. FINDINGS: There is focal transverse lucency within the anterior tibial cortex corresponding to signal abnormality on prior MRI and compatible with stress fracture.  No additional fracture.  No lytic or blastic lesion. Soft tissues are unremarkable.  Achilles tendon is intact.  No knee or ankle effusion.  4. IMPRESSION: Anterior tibial stress fracture.    1. MRI ordered due to right foot pain taken 23.   2. MRI images were reviewed personally by me and then directly with patient.  3. FINDINGS:   LIGAMENTS: Lisfranc, dorsal talonavicular, bifurcate and dorsal calcaneocuboid ligaments are intact.  TENDONS: Visualized flexor and extensor tendons are normal.  BONES: No fracture.  No avascular necrosis.  No marrow infiltrative process.  JOINTS/CARTILAGE: Visualized chondral surfaces are preserved.  No high-grade partial or full-thickness chondral defects.  No osteochondral lesions.  No subchondral edema.  MUSCLES: Normal bulk and signal intensity.  MISCELLANEOUS: Hallux plantar plate complex and lesser MTP plantar plates appear within normal limits.  Visualized plantar aponeurosis is normal.  Sinus tarsi demonstrates preserved signal intensity.  Visualized subcutaneous soft tissues are within normal limits.  4. IMPRESSION: No MR imaging findings to account for reported history of foot pain.    1. MRI ordered due to right shin pain taken 23.   2. MRI images were reviewed personally by me and then directly with patient.  3. FINDINGS: BONES: Cortical thickening at the anterior mid tibia with an intracortical area of edema and surrounding periosteal edema.  No medullary marrow signal  "abnormality.  No avascular necrosis.  No marrow infiltrative process. MUSCLES: Normal bulk and signal intensity. MISCELLANEOUS: Visualized neurovascular structures appear   4. IMPRESSION: Anterior tibial cortex stress fracture with associated intracortical and periosteal edema.  No medullary signal abnormality.    1. X-ray ordered due to right shin pain. (AP and lateral views) taken 4/22/23.   2. X-ray images were reviewed personally by me and then directly with patient.  3. FINDINGS: Focal cortical thickening with small associated lucency involving the anterior cortex of the mid tibial diaphysis, imaging findings typical of a stress fracture. Fibula is intact.Soft tissues are unremarkable.  4. IMPRESSION: Abnormal study as above.    1. X-ray ordered due to right foot pain. (AP, lateral, and oblique views) taken 4/22/23.   2. X-ray images were reviewed personally by me and then directly with patient.  3. FINDINGS: No acute fracture or dislocation seen.  No soft tissue edema or radiopaque retained foreign body.  4. IMPRESSION: No acute osseous abnormality seen    Outside tibia/fibula MRI reports from 06/26/2023 and 08/09/2023 noted no signs of stress fracture or bony edema.  Patient did not come with imaging CD's for review.    Objective:     VITAL SIGNS: /78   Pulse (!) 50   Ht 6' 5" (1.956 m)   Wt 93.8 kg (206 lb 12.7 oz)   BMI 24.52 kg/m²    General    Vitals reviewed.  Constitutional: He is oriented to person, place, and time. He appears well-developed and well-nourished.   Neurological: He is alert and oriented to person, place, and time.   Psychiatric: He has a normal mood and affect. His behavior is normal.             MUSCULOSKELETAL EXAM  ANKLE: right ANKLE  The affected ankle is compared to the contralateral ankle.    Observation:    There is no edema, erythema, or ecchymosis.   Shoes reveal a normal wear pattern.  + pes planus  Normal callus pattern on the feet bilaterally.    Achilles tendon and " calcaneal insertion reveals no deformities  No leg or intrinsic foot muscle atrophy.  Squatting reveals symmetric pronation of the bilateral feet.   Able to rise on toes with symmetric supination.    Normal gait without evidence of antalgia.    ROM (* = with pain):  Active dorsiflexion to 20° on left and 20° on right  Active plantarflexion to 50° on left and 50° on right    Active ankle inversion to 35° on left and 35° on right  Active ankle eversion to 15° on left and 15° on right  Full active flexion/extension of the toes bilaterally.   Heel cords without tightness bilaterally.    Tenderness To Palpation:  No tenderness at the ATFL, CFL, PTFL, or deltoid ligaments  No tenderness over the distal anterior syndesmosis, distal tibia/fibula, fibular head/shaft  No tenderness at medial or lateral malleoli   No tenderness at navicular, cuboid, cuneiforms, talus, or calcaneous  No tenderness along the metatarsals or phalanges  No tenderness at the Achilles tendon calcaneal insertion  No tenderness at the posterior tibial or peroneal tendons    Strength Testing (* = with pain):  Dorsiflexion - 5/5 on left and 5/5 on right  Platarflexion - 5/5 on left and 5/5 on right  Resisted Inversion - 5/5 on left and 5/5 on right  Resisted Eversion - 5/5 on left and 5/5 on right  Great Toe Extension - 5/5 on left and 5/5 on right  Great Toe Flexion - 5/5 on left and 5/5 on right    Special Tests:  Anterior talar drawer - negative and without dimpling  Talar tilt - negative  Reverse Talar tilt - negative    Heel tap test - negative  Distal tib/fib squeeze test - negative  External rotation stress (Kleiger) test - negative  Valdez squeeze test - negative    Metatarsal squeeze test - negative  Midfoot stress test - negative  Calcaneal squeeze test - negative    Percussion over the anterior tibia does not produce pain or discomfort    Negative single leg hop test    No subluxation of the peroneal tendons with resisted  eversion.    Vascular/Sensory Exam:  DP and PT pulses intact bilaterally  No skin changes, no abnormal hair distribution  Sensation intact to light touch throughout the saphenous, sural, superficial peroneal, deep peroneal, and tibial nerve distributions  Negative Tinel's test over tarsal tunnel  2+/4 reflexes at L4 and S1 dermatomes  Capillary refill intact <2 seconds in all toes bilaterally.    IMAGIN. MRI ordered due to right tib/fib taken 24.   2. MRI images were reviewed personally by me and then directly with patient.  3. FINDINGS:   Correlating with the radiographic finding of 2024, is a low level abnormal signal intensity within the anterior cortical margin best identified series 7, image 25. There is no evidence for periostitis. Findings are consistent with chronic stress fracture. Additionally, there is evidence for edema identified level of the medial gastrocnemius/soleus level best identified proximal tibia axial series 11, image 15. Findings consistent with grade 1 stress injury (versus less likely, sequela of contusion).   4. IMPRESSION: Chronic stress fracture anterior tibial margin corresponding with radiograph 2024.  No evidence for intramedullary edema, or periostitis.Mild focal muscle edema, medial gastrocnemius/soleus level proximally, likely muscle strain.    1. MRI ordered due to right tib/fib taken 3/19/24.   2. MRI images were reviewed personally by me and then directly with patient.  3. FINDINGS: There is mild edema of the proximal medial head of the gastrocnemius muscle no rupture or retraction seen. No marrow replacement, marrow edema, infection, or neoplasm is seen. No abscess, or fluid collection seen. Low level abnormal signal intensity within the anterior cortical margin best identified series 6, image 13 without periostitis/marrow edema c/w chronic stress fx noted on 2023  4. IMPRESSION: There is mild edema of the anterior aspect of the medial head of the  gastrocnemius muscle. This is thought to represent a grade 1 strain. No rupture or retraction seen.  Persistent low level abnormal signal intensity within the anterior cortical margin best identified series 6, image 13 without periostitis/marrow edema c/w chronic stress fx noted on 4/23/2023.     Assessment:      Encounter Diagnoses   Name Primary?    Pain in right shin Yes    Stress fracture of right tibia, sequela         Plan:   1. History of right tibial stress fracture of the anterior cortex 1st diagnosed in April 2023. Pt's pain initially resolved with rest and was able to play the last basketball season, but pain returned near the end of the  Recent MRI imaging reports noted persistent anterior cortical edema.   - Given persistent pain with increased activity, recommend further discussion with orthopedic sx for an IM nail. Pt. Saw Dr. Hunt back in April 2023 who recommend the IM nail at that time. Pt. Is moving home to [place] next week and plans on receiving a surgical consult there.   - Discussed with patient and father (via telephone) risks of continuing playing on chronic stress fracture of worsening of the stress fracture, completion of the fracture of his tibia or re-aggrevation of his stress fracture later when he is in season, as he did last season. The more definitive  treatment would be an IM nail, as recommended by Dr. Hunt.   - Recommend continued OTC medial arch support for bilateral pes planus.  (orange insoles)  -  X-ray images of right tib/fib taken 4/21/23 (AP and lateral views) showed Focal cortical thickening with small associated lucency involving the anterior cortex of the mid tibial diaphysis, imaging findings typical of a stress fracture.. Images were personally reviewed with patient.  - MRI  images of right tib/fib taken 4/22/23 showed Anterior tibial cortex stress fracture with associated intracortical and periosteal edema. Images were personally reviewed with patient.  - MRI  images of right foot taken 4/23/23 showed No MR imaging findings to account for reported history of foot pain. Images were personally reviewed with patient.  - CT images of right tib/fib taken 4/24/23 showed Anterior tibial stress fracture. I  Outside tibia/fibula MRI reports from 06/26/2023 and 08/09/2023 noted no signs of stress fracture or bony edema.  Patient did not come with imaging CD's for review.  - MRI  images of right tib/fib taken 1/9/24 showed Chronic stress fracture anterior tibial margin corresponding with radiograph 01/02/2024.  No evidence for intramedullary edema, or periostitis.Mild focal muscle edema, medial gastrocnemius/soleus level proximally, likely muscle strain.. Images were personally reviewed with patient.  - MRI  images of right tib/fib taken 3/19/24 showed mild edema of the anterior aspect of the medial head of the gastrocnemius muscle. This is thought to represent a grade 1 strain. No rupture or retraction seen. Persistent low level abnormal signal intensity within the anterior cortical margin best identified series 6, image 13 without periostitis/marrow edema c/w chronic stress fx noted on 4/23/2023. Images were personally reviewed with patient.    2. Follow-up as needed if pain deteriorates or new issue arises. Pt. Instructed to contact AT for setting up another apt. With Dr. Hunt, is he decides to proceed with surgery in [place] vs. [place].     3. Patient agreeable to today's plan and all questions were answered.  Plan also discussed with AT, Anna Blanchard, and father via telephone call.      This note is dictated using the M*Modal Fluency Direct word recognition program. There are word recognition mistakes that are occasionally missed on review.      Total time spent face-to face with patient counseling or coordinating care including prognosis, differential diagnosis, risks and benefits of treatment, instructions, compliance risk reductions as well as non-face-to-face time  personally spent reviewing medial record, medical documentation, and coordination of care.     EST MINUTES X   46710 10-19    93105 20-29    92757 30-39    57348 40-54 x   NEW     76063 15-29    [id number] 30-44    30924 45-59    81663 60-74    PHONE      5-10    [id number] 11-20    71078 21-30

## 2024-04-30 ENCOUNTER — HOSPITAL ENCOUNTER (OUTPATIENT)
Dept: RADIOLOGY | Facility: HOSPITAL | Age: 24
Discharge: HOME | End: 2024-04-30
Attending: NEUROMUSCULOSKELETAL MEDICINE & OMM
Payer: COMMERCIAL

## 2024-04-30 ENCOUNTER — OFFICE VISIT (OUTPATIENT)
Dept: SPORTS MEDICINE | Facility: CLINIC | Age: 24
End: 2024-04-30
Payer: COMMERCIAL

## 2024-04-30 VITALS
HEART RATE: 50 BPM | SYSTOLIC BLOOD PRESSURE: 130 MMHG | DIASTOLIC BLOOD PRESSURE: 78 MMHG | HEIGHT: 77 IN | BODY MASS INDEX: 24.42 KG/M2 | WEIGHT: 206.81 LBS

## 2024-04-30 DIAGNOSIS — M79.661 PAIN IN RIGHT SHIN: Primary | ICD-10-CM

## 2024-04-30 DIAGNOSIS — M79.604 RIGHT LEG PAIN: ICD-10-CM

## 2024-04-30 DIAGNOSIS — M84.361S STRESS FRACTURE OF RIGHT TIBIA, SEQUELA: ICD-10-CM

## 2024-04-30 PROCEDURE — 1160F RVW MEDS BY RX/DR IN RCRD: CPT | Mod: CPTII,S$GLB,, | Performed by: NEUROMUSCULOSKELETAL MEDICINE & OMM

## 2024-04-30 PROCEDURE — 99999 PR PBB SHADOW E&M-EST. PATIENT-LVL III: CPT | Mod: PBBFAC,,, | Performed by: NEUROMUSCULOSKELETAL MEDICINE & OMM

## 2024-04-30 PROCEDURE — 99215 OFFICE O/P EST HI 40 MIN: CPT | Mod: S$GLB,,, | Performed by: NEUROMUSCULOSKELETAL MEDICINE & OMM

## 2024-04-30 PROCEDURE — 3075F SYST BP GE 130 - 139MM HG: CPT | Mod: CPTII,S$GLB,, | Performed by: NEUROMUSCULOSKELETAL MEDICINE & OMM

## 2024-04-30 PROCEDURE — 1159F MED LIST DOCD IN RCRD: CPT | Mod: CPTII,S$GLB,, | Performed by: NEUROMUSCULOSKELETAL MEDICINE & OMM

## 2024-04-30 PROCEDURE — 3008F BODY MASS INDEX DOCD: CPT | Mod: CPTII,S$GLB,, | Performed by: NEUROMUSCULOSKELETAL MEDICINE & OMM

## 2024-04-30 PROCEDURE — 3078F DIAST BP <80 MM HG: CPT | Mod: CPTII,S$GLB,, | Performed by: NEUROMUSCULOSKELETAL MEDICINE & OMM

## 2024-05-09 ENCOUNTER — ATHLETIC TRAINING SESSION (OUTPATIENT)
Dept: SPORTS MEDICINE | Facility: CLINIC | Age: 24
End: 2024-05-09
Payer: COMMERCIAL

## 2024-05-09 DIAGNOSIS — M79.661 PAIN IN RIGHT SHIN: Primary | ICD-10-CM

## 2024-05-09 NOTE — PROGRESS NOTES
[facility] [employer] [facility]       Athletic Training Phone/Text Conversation     Name: Josue Campos  Clinic Number: [id number]  Sport: Men's Basketball    Reason For Communication: Inform athlete of MRI results  Type of Communication: Phone call      Notes   Summary of conversation:    Pt was informed that his last MRI still shows that he had edema in the his anterior tibia bone complex which means pt is advised to continue with discussions of possible surgery to tibia bone. Pt was informed that any second opinions [facility]. Pt was informed that AT can schedule an appointment with [employer]'s Team physician or Dr. Hunt to discuss options about surgery. Pt stated he understood.       Anna MCCRAY, LAT, ATC    [employer] [place] [facility]

## 2024-05-31 ENCOUNTER — ATHLETIC TRAINING SESSION (OUTPATIENT)
Dept: SPORTS MEDICINE | Facility: CLINIC | Age: 24
End: 2024-05-31
Payer: COMMERCIAL

## 2024-05-31 DIAGNOSIS — M79.661 PAIN IN RIGHT SHIN: Primary | ICD-10-CM

## 2024-05-31 NOTE — PROGRESS NOTES
[facility]       Athletic Training Phone/Text Conversation     Name: Josue Campos  Clinic Number: [id number]  Sport: Men's Basketball    Reason For Communication: Update on pt health and pain  Type of Communication: Phone call      Notes     5/31/2024  Communicated with athlete via phone about next steps and patient current pain. Pt currently has no pain and has started PT. Pt will determine if surgery is the route he would like to take after completion on CT scan and Surgeon opinion.        Anna Blanchard MSAT, LAT, ATC    [employer] [place] [facility]

## 2024-07-24 ENCOUNTER — ATHLETIC TRAINING SESSION (OUTPATIENT)
Dept: SPORTS MEDICINE | Facility: CLINIC | Age: 24
End: 2024-07-24

## 2024-07-24 DIAGNOSIS — M89.8X6 PAIN OF RIGHT TIBIA: Primary | ICD-10-CM

## 2024-07-24 NOTE — PROGRESS NOTES
"    [facility]       [facility]       Athletic Training Phone/Text Conversation     Name: Josue Campos  Clinic Number: [id number]  Sport: Men's Basketball      Notes     Communication with pt and pt's dad  Documentation:    5/31/2024   Pt called on phone to discuss symptoms and was informed that [facility] would be able to pay for his second opinion from a surgeon due to recommendation from by team physician for second opinion.   Pt stated he is doing well and does not currently have any pain/symptoms.     6/10/2024  Text was sent by  Anna Blanchard  "Hey just checking in. How did your appointment with PT go and CT scan? Feel free to call when you have a moment"    Patient responded  "Everything went well CT showed a lot of improvement pretty much decided against surgery but have not ruled out other treatments.      6/11/2024   Anna Blanchard communicated with Pt to discuss possible options and what information is needed.   *Surgeon's information pt spoke with that went over your ct scan with pt  *Doctors note from the surgeon stating they agree you do not need surgery to participate in activity   *CT scan impression/assessment   *what kind of injection you might be considering.     Pt was informed that I could send this information to his mom or his dad.     6/13/2024    informed Patients parent of what information would be needed via phone call    7/23/2024  Athletic spoke with Pt's dad via phone to state was needed for reimbursement. Later emailed the same day.  Email: "We will need the medical notes from the treating or prescribing physician for the injections for reimbursement. (I will send a text message to Josue requesting he acquire this information from Dr. Ragland to give to me.)     Prior to Josue's return to campus we will need the following (I will also send Josue text to acquire this information)  Josue's " "physical therapy appointment notes and his PT's recommendations for further care while he is here in [place].   Clearance note for participation in athletics or restrictions/limitations from Josue's overseeing physician (Dr. Vasquez)."      Anna MCCRAY, LAT, ATC    [employer] [place] [facility] [place]         "

## 2024-08-02 ENCOUNTER — ATHLETIC TRAINING SESSION (OUTPATIENT)
Dept: SPORTS MEDICINE | Facility: CLINIC | Age: 24
End: 2024-08-02
Payer: COMMERCIAL

## 2024-08-02 DIAGNOSIS — M79.661 PAIN IN RIGHT SHIN: Primary | ICD-10-CM

## 2024-08-02 NOTE — PROGRESS NOTES
"    [facility]       Athletic Training Phone/Text Conversation     Name: Josue Campos  Clinic Number: [id number]  Sport: Men's Basketball    Reason For Communication: Medical Notes from Surgeon    Type of Communication: text/in person w/  Donald Reyes/Head AT [facility]      Notes     Patient was informed on 7/31/24 via text that he would need to bring medical notes from Dr. Ragland to his appointment with Dr. Hunt or email the notes to HENOK Blanchard.     On 8/1/2024   AT Anna Blanchard was informed by [employer]emi Head AT [facility] that patient would not be able to get the notes from doctor from  Donald Reyes    Pt was texted by AT Anna Blanchard "I was informed that you could not get medical notes from Dr. Ragland. I recommend you call Dr. Ragland's office for a medical note stating what his recommendations were for you at that time/during the appointment.    AT Anna Blanchard was informed Patient then contacted  again  Donald Reyes and  asked to clarify what documents are needed to call patient's Dad Brooks Campos of what was needed.     Patient did not respond to texts from AT [facility] proceeding inital text requesting medical notes on 7/31/2024.    Medical notes from Dr. Ragland appointment was recieved via email sent by patients Dad Felicia Campos on 8/1/2024      Anna Blanchard MSAT, LAT, ATC    [employer] [place] [facility]     "

## 2024-08-15 ENCOUNTER — OFFICE VISIT (OUTPATIENT)
Dept: SPORTS MEDICINE | Facility: CLINIC | Age: 24
End: 2024-08-15
Payer: COMMERCIAL

## 2024-08-15 VITALS
BODY MASS INDEX: 25.55 KG/M2 | HEIGHT: 77 IN | WEIGHT: 216.38 LBS | HEART RATE: 48 BPM | SYSTOLIC BLOOD PRESSURE: 135 MMHG | DIASTOLIC BLOOD PRESSURE: 84 MMHG

## 2024-08-15 DIAGNOSIS — M79.661 PAIN IN RIGHT SHIN: Primary | ICD-10-CM

## 2024-08-15 PROCEDURE — 99214 OFFICE O/P EST MOD 30 MIN: CPT | Mod: S$GLB,,, | Performed by: ORTHOPAEDIC SURGERY

## 2024-08-15 PROCEDURE — 1159F MED LIST DOCD IN RCRD: CPT | Mod: CPTII,S$GLB,, | Performed by: ORTHOPAEDIC SURGERY

## 2024-08-15 PROCEDURE — 3008F BODY MASS INDEX DOCD: CPT | Mod: CPTII,S$GLB,, | Performed by: ORTHOPAEDIC SURGERY

## 2024-08-15 PROCEDURE — 3079F DIAST BP 80-89 MM HG: CPT | Mod: CPTII,S$GLB,, | Performed by: ORTHOPAEDIC SURGERY

## 2024-08-15 PROCEDURE — 3075F SYST BP GE 130 - 139MM HG: CPT | Mod: CPTII,S$GLB,, | Performed by: ORTHOPAEDIC SURGERY

## 2024-08-15 PROCEDURE — 99999 PR PBB SHADOW E&M-EST. PATIENT-LVL III: CPT | Mod: PBBFAC,,, | Performed by: ORTHOPAEDIC SURGERY

## 2024-08-15 NOTE — PROGRESS NOTES
CC: Right shin and right foot pain     Josue is a 21 yo M college  who is c/o right anterior tibial pain. 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  He tells me that he is in the process of finishing up his classes at [employer] and graduating and has one year of college eligibility left.  He tells me he is looking at other schools and is not going to play for [employer] next year.      He states that he had some intermittent pain over his shin while playing basketball during the season but states that he was able to play with it.  He states that he was playing basketball last week and noticed that the anterior shin pain had gotten worse, though he was still able to play.  He also noted that he had foot pain on the same side. He came in to be evaluated last week and he was found to have an anterior tibial stress fracture on imaging at the end of last week on initial presentation to our clinic.  He was placed in a boot, held from basketball work-outs and returns to clinic following further imaging with his parents to discuss his diagnosis and his care options.      He has been nonweightbearing in a CAM walker boot with crutches since his visit with Omer Segovia 4 days ago.      Hx (4/21/23 - Omer Segovia PA-C):  Josue Campos is a 24 y.o. Male who presents today for initial evaluation of right shin and right foot pain.  He is a  at [employer], but has plans to transfer to a different school at the end of this academic semester. The [employer] training staff is unaware of the injury per patient.  Patient reports that he has been experiencing pain in his right shin for over a year now.  He reports a history of shin splints that have typically affected the medial aspect of his shin, however the pain that he has been experiencing recently seems quite  different.  He localizes the pain to the right anterior shin and points to a noticeable area of swelling/tenderness about midway down his shin.  He reports that this is exquisitely tender to touch and has significant pain with running and jumping although he has continued to play through this injury.  He denies any direct trauma to the area that he can recall.  Regarding the foot, he has had on and off pain for the past 2-3 years.  He localizes the pain to the medial midfoot and is worse with running and jumping as well as standing on his toes.  He has not noticed any significant swelling in the area.  This also seems to have been slowing him down from an activity standpoint.  No prior injuries or surgeries on his right foot, ankle, or leg.    Is affecting ADLs.     Interval history 08/15/2024:   Patient returns for repeat evaluation regarding his right castañeda he last saw Dr. Manrique at [facility] several months ago and at that time was recommended possible surgery including intramedullary peggy for the right tibia in the setting of his stress reaction.  Patient at that time elected to proceed without surgery and attempt all conservative measures.  He has completed a full course of Forteo directed by the endocrinology department at [facility] as well as ultrasound and activity modification.  Since being cleared from the staff at [facility] he has returned to some basketball activity in the past few weeks ramping up to 1 V 1 drills with some impact activity.  Today he complains of no pain and no recurrence of the symptoms.  He has a recent MRI from August 20, 2024 showing no detrimental change to the previous stress reaction site.      PAST MEDICAL HISTORY: History reviewed. No pertinent past medical history.  PAST SURGICAL HISTORY: History reviewed. No pertinent surgical history.  FAMILY HISTORY: No family history on file.  SOCIAL HISTORY:   Social History     Socioeconomic History    Marital status: Single       MEDICATIONS:   Current  "Outpatient Medications:     meloxicam (MOBIC) 15 MG tablet, Take 1 tablet (15 mg total) by mouth once daily. (Patient not taking: Reported on 8/15/2024), Disp: 30 tablet, Rfl: 0  ALLERGIES: Review of patient's allergies indicates:  No Known Allergies    VITAL SIGNS: /84   Pulse (!) 48   Ht 6' 5" (1.956 m)   Wt 98.1 kg (216 lb 6.1 oz)   BMI 25.66 kg/m²      Review of Systems   Constitution: Negative. Negative for chills, fever and night sweats.   HENT: Negative for congestion and headaches.    Eyes: Negative for blurred vision, left vision loss and right vision loss.   Cardiovascular: Negative for chest pain and syncope.   Respiratory: Negative for cough and shortness of breath.    Endocrine: Negative for polydipsia, polyphagia and polyuria.   Hematologic/Lymphatic: Negative for bleeding problem. Does not bruise/bleed easily.   Skin: Negative for dry skin, itching and rash.   Musculoskeletal: Negative for falls and muscle weakness. Positive for right shin pain  Gastrointestinal: Negative for abdominal pain and bowel incontinence.   Genitourinary: Negative for bladder incontinence and nocturia.   Neurological: Negative for disturbances in coordination, loss of balance and seizures.   Psychiatric/Behavioral: Negative for depression. The patient does not have insomnia.    Allergic/Immunologic: Negative for hives and persistent infections.       PHYSICAL EXAMINATION    General:  The patient is alert and oriented x 3.  Mood is pleasant.  Observation of ears, eyes and nose reveal no gross abnormalities.  No labored breathing observed.    Right Foot and Ankle, Lower extremity exam    INSPECTION:      ALIGNMENT:  Gait:    Antalgic   Hindfoot  Normal    Scars:   None    Midfoot: Normal  Swelling:   mild     Forefoot: Normal  Color:   Normal      Atrophy:  None    Collective Ankle-Hindfoot Alignment    Heel / Toe Walking: + pain    Good -plantigrade (PG), well aligned             KNEE:  TENDERNESS / CREPITUS (T / C): "          T / C      T / C   Patella   - / -   Lateral joint line   - / -   Peripatellar medial  -  Medial joint line    - / -   Peripatellar lateral -  Medial plica   - / -   Patellar tendon -   Popliteal fossa   - / -   Quad tendon   -   Gastrocnemius   -   Prepatellar Bursa - / -   Quadricep   -   Tibial tubercle  -  Thigh/hamstring  -   Pes anserine/HS -  Fibula    -   ITB   - / -  Tibia     + (midshaft tibia)   Tib/fib joint  - / -  LCL    -     MFC   - / -   MCL: Proximal  -    LFC   - / -    Distal   -          ROM: (* = pain)  PASSIVE   ACTIVE    Left :   5 / 0 / 145   5 / 0 / 145     Right :    5 / 0 / 145   5 / 0 / 145       TENDERNESS:  lATERAL:    anterior:  Sinus tarsi:  None  Anteromedial joint line:  none  Syndesmosis:  none  Anterolateral joint line:  none  ATFL:   none  Talonavicular:    none   CFL:   none  Anterior tibialis:   none  Anterolateral gutter: none  Extensor tendons:   none  Fibula:   none  Peroneal tendons: none  POSTERIOR:  Peroneal tubercle.  None  Medial/lateral achilles:   none       Medial/lateral achilles insertion: none  MEDIAL:      Deltoid:  none  CALCANEUS:  Malleolus:  none  Retrocalcaneal:   none  PTT:   none  Medial achilles:   none  Navicular:  +  Lateral achilles:   none       Calcaneal tuberosity:   none  FOOT:    Calcaneal cuboid  none MT / MT heads:  +  Navicular   +  Medial cord origin PF:  none  Cuneiforms:   + Web space:   none  Lisfranc    none  Tarsal tunnel:   none  Base of the fifth metatarsal  none Tinels sign   Neg    KNEE: No tenderness to palpation regarding the right knee.  He exhibits full active and passive range of motion of the right knee.  Ligamentously stable.  Negative Marie's.        RANGE OF MOTION:  RIGHT/ LEFT   STRENGTH: (affected)  Ankle DF/PF:  15*/45* 15/45    Anterior tibialis: 4/5*     Eversion/Inversion: 15/25 15/25  Posterior tibialis: 5/5*   Midfoot ABD/ADD: 10/10 10/10  Gastroc-soleus: 5/5   First MTP  DF/PF: 60/25 60/25  Peroneals:  5/5    (* = pain)    EHL:   5/5         FHL:   5/5      SPECIAL TESTS:   ANKLE INSTABILITY: (*pain)    Anterior drawer:   Normal      (C-W contralateral side)     Inversion:   30°     Eversion  10°            Collective Instability: (Ant-post and varus-valgus)     Stable        PROVOCATIVE TESTING:    Forced DF/ER: No pain at syndesmosis.    Mid-leg squeeze  No pain at syndesmosis, + Pain at mid tibia    Forced DF:  No pain anterior joint line.      Forced PF:  No pain posterior ankle.     Forced INV:  No pain present laterally    Forced EV:  No pain medial     Valdezs sign: Normal ankle plantar flexion.     Resisted peroneal No subluxation or pain    1st-2nd MT toggle + pain at navicular/medial cuneiform    MT-T torque  + pain at navicular/medial cuneiform     NEUROLOGIC TESTING:  All dermatomes foot, ankle and leg have normal sensation light touch  Ankle Reflexes 2+, symmetric   Negative Babinski and No Clonus    VASCULAR:  2+ pulses PT/DT with brisk capillary refill toes.    Other Findings:  N/A    X-rays right tibia/fibula (4/21/2023):  There is a stress fracture present with cortical involvement along the anterior cortex with associated periosteal reaction/thickening.  The ankle mortise appears intact with no apparent widening.  Visualized portions of the knee also appear normal without any acute fractures.    X-rays right foot (4/21/2023):  No acute fracture or dislocation seen.  Satisfactory alignment.  No radiopaque foreign body or significant soft tissue edema seen.      CT Right tibia/fibula:   Narrative & Impression  EXAMINATION:  CT LEG (TIBIA-FIBULA) WITHOUT CONTRAST RIGHT     CLINICAL HISTORY:  Fracture, tib/fib;  Stress fracture, right tibia, initial encounter for fracture     TECHNIQUE:  CT right tibia/fibula performed without contrast.  Coronal and sagittal reformats provided.     COMPARISON:  MRI 04/21/2023     FINDINGS:  There is focal transverse lucency within  the anterior tibial cortex corresponding to signal abnormality on prior MRI and compatible with stress fracture.  No additional fracture.  No lytic or blastic lesion.     Soft tissues are unremarkable.  Achilles tendon is intact.  No knee or ankle effusion.     Impression:     Anterior tibial stress fracture.    Mri right/tibia -   EXAMINATION:  MRI TIBIA FIBULA WITHOUT CONTRAST RIGHT     CLINICAL HISTORY:  Fracture, tib/fib;tibial stress fracture with cortical disruption seen on x-ray;  Stress fracture, right tibia, initial encounter for fracture     TECHNIQUE:  Routine MRI evaluation of the right tibia-fibula performed without contrast.     COMPARISON:  Radiograph 04/21/2023.     FINDINGS:  BONES: Cortical thickening at the anterior mid tibia with an intracortical area of edema and surrounding periosteal edema.  No medullary marrow signal abnormality.  No avascular necrosis.  No marrow infiltrative process.     MUSCLES: Normal bulk and signal intensity.     MISCELLANEOUS: Visualized neurovascular structures appear normal.     Impression:    Anterior tibial cortex stress fracture with associated intracortical and periosteal edema.  No medullary signal abnormality.    ASSESSMENT:   Right lower leg/shin pain  Anterior tibial stress fracture, right  Right foot pain, possible midfoot stress fracture    PLAN:  Treatment options were discussed with the patient about his anterior tibia.  I reviewed the Xrays/MRI/CT images with his, including the relevant anatomy, and what this means for his knee.    We discussed that he had a stress fracture in the anterior cortex of his tibia, which was present on all imaging (xrays, CT and MRI).  We discussed that while he could continue to play with that that there were risks of him continuing to play.  The biggest risks that we discussed included worsening of the stress fracture, completion of the fracture of his tibia or re-aggrevation of his stress fracture later when he is in  season.  We discussed that getting these to heal with conservative treatment can be challenging.  We discussed that the most reliable way to treat these stress fractures involves surgery with a reamed intramedullary nail in his tibia to protect the bone.  He and his family seem to understand this.  At this time we will clear him to return to full basketball activity knowing that this could lead to recurrence of his symptoms which eventually may lead to either fracture and or the need for surgery to treat recurrence of the stress reaction.  Surgery would include intramedullary nailing of the right tibia.  Patient and family members understand this risk and wished to continue with basketball activities.    We will see Josue back as needed if he needs anything else while he is in [place] of if he decided to continue his care here.      All questions were answered. Instructed patient to call with questions or concerns in the interim.

## 2024-08-21 ENCOUNTER — ATHLETIC TRAINING SESSION (OUTPATIENT)
Dept: SPORTS MEDICINE | Facility: CLINIC | Age: 24
End: 2024-08-21
Payer: COMMERCIAL

## 2024-08-21 DIAGNOSIS — M79.661 PAIN IN RIGHT SHIN: Primary | ICD-10-CM

## 2024-08-21 NOTE — PROGRESS NOTES
"    [facility]       Athletic Training Conversation     Name: Josue Campos  Clinic Number: [id number]  Sport: Men's Basketball      Notes     Teams Meeting with Pt"s mom, dad physical therapist in [place] " Josh White" and Physical therapist in [place] Hector Braun.     Summary of conversation:  Josh White discussed pt's continued plan of care while here in [place]. Hector Braun will continue rehab with pt twice a week. Josh White discussed that pt needs to learn his body and use his best day as a guide for how he is feeling throughout the season.       Anna MCCRAY, LAT, ATC    [employer] [facility]     "

## 2024-08-28 ENCOUNTER — TELEPHONE (OUTPATIENT)
Dept: SPORTS MEDICINE | Facility: CLINIC | Age: 24
End: 2024-08-28
Payer: COMMERCIAL

## 2024-08-28 NOTE — TELEPHONE ENCOUNTER
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 Recall that he has seen several surgeons and [facility] doctors regarding his chronic tibial stress fracture. A tibial peggy has been recommended by multiple physicians and he does not want to pursue this option at this time. He had taken 2 months of Forteo (prescribed by physician at home in [place]). He has been using a bone stimulator and takes Vit D and Calcium daily. He continues to exercise without issue. He denies any current shin pain. He does admit to understanding that full fracture is still possible without surgical fixation.    Physical Exam:  ANKLE: right   The affected ankle is compared to the contralateral ankle.    Observation:    There is no edema, erythema, or ecchymosis.   Shoes reveal a normal wear pattern.  No structural deformities including pes planus/cavus, hallux valgus, or toe deformities.   Negative too-many toes sign.  Normal callus pattern on the feet bilaterally.    Achilles tendon and calcaneal insertion reveals no deformities  No leg or intrinsic foot muscle atrophy.  Squatting reveals symmetric pronation of the bilateral feet.   Able to rise on toes with symmetric supination.    Normal gait without evidence of antalgia.    ROM: (expected degree)  Active dorsiflexion to 20° bilaterally.    Active plantarflexion to 50° bilaterally.    Active ankle inversion to 35° bilaterally.    Active ankle eversion to 15° bilaterally.    Full active flexion/extension of the toes bilaterally.   Heel cords without tightness bilaterally.    Tenderness To Palpation:  No tenderness at the ATFL, CFL, PTFL, or deltoid ligaments  No tenderness over the distal anterior syndesmosis, distal  tibia/fibula, fibular head/shaft  No tenderness at medial or lateral malleoli   No tenderness at navicular, cuboid, cuneiforms, talus, or calcaneous  No tenderness along the metatarsals or phalanges  No tenderness at the Achilles tendon calcaneal insertion  No tenderness at the tibialis posterior, flexor digitorum longus, flexor hallucis longus   No tenderness at the peroneal tendons    Strength Testing:  Dorsiflexion - 5/5  Platarflexion - 5/5  Resisted Inversion - 5/5  Resisted Eversion - 5/5  Great Toe Extension - 5/5  Great Toe Flexion - 5/5    Assessment:  Right tibial stress fracture, chronic      Plan:     Education provided regarding continued management for his non-healing, chronic tibial stress fracture. OK to continue with bone stimulator.    Medications - Continue with Vit D/calcium. He had taken Forteo for 2 months over the summer (he states MRI following showed healing - I will review these images)    Exercises - Continue with lower extremity strengthening.     Referrals - no further referrals needed at this time.     Imaging - Will review MRI obtained at home in [place] over the summer.    ATR - Sport Participation: Full sport participation as tolerated. Extensive education on symptoms that require prompt evaluation were discussed and understanding was expressed.    Follow up - as needed            This note was completed in the presence of the physician noted above    This note is dictated using the M*Modal Fluency Direct word recognition program. There are word recognition mistakes that are occasionally missed on review.

## 2024-09-16 ENCOUNTER — ATHLETIC TRAINING SESSION (OUTPATIENT)
Dept: SPORTS MEDICINE | Facility: CLINIC | Age: 24
End: 2024-09-16
Payer: COMMERCIAL

## 2024-09-16 DIAGNOSIS — S76.311A HAMSTRING STRAIN, RIGHT, INITIAL ENCOUNTER: Primary | ICD-10-CM

## 2024-09-16 NOTE — PROGRESS NOTES
Reason for Encounter New Injury    Subjective:       Chief Complaint: Josue Campos is a 24 y.o. male student at [employer] who had concerns including Pain of the Right Hip.    Handedness: right-handed  Sport played: basketball      Level: college            Pain  This is a new problem. The current episode started today. The problem has been unchanged. Nothing aggravates the symptoms. The treatment provided no relief.       Review of Systems   All other systems reviewed and are negative.                Objective:       General: Josue is well-developed, well-nourished, appears stated age, in no acute distress, alert and oriented to time, place and person.             Right Ankle/Foot Exam     Tests   Heel Walk: able to perform  Tiptoe Walk: able to perform  Single Heel Rise: able to perform  Double Heel Rise: able to perform      Right Knee Exam     Inspection   Swelling: absent  Deformity: absent  Bruising: absent    Tenderness   Right knee tenderness location: Hamstring(semitend.    Range of Motion   Extension:  abnormal Right knee extension grade: pain present.  Flexion:  normal     Tests   Meniscus   Marie:  Medial - negative Lateral - negative  Ligament Examination   Lachman: normal (-1 to 2mm)   PCL-Posterior Drawer: normal (0 to 2mm)     MCL - Valgus: normal (0 to 2mm)  LCL - Varus: normal  Pivot Shift: normal (Equal)  Reverse Pivot Shift: normal (Equal)  Dial Test at 30 degrees: normal (< 5 degrees)  Dial Test at 90 degrees: normal (< 5 degrees)  Posterior Sag Test: negative  Posterolateral Corner: unstable (>15 degrees difference)    Other   Sensation: normal  Apley Grind Test: negative  Home Bounce Test: negative  Thessaly's Test: negative    Right Hip Exam     Inspection   Swelling: absent  Bruising: absent  No deformity of hip.    Range of Motion   The patient has normal right hip ROM.    Tests   Pain w/ forced internal rotation (ELIZABETH): absent  Pain w/ forced external rotation (FADIR): absent  Bibiana:  negative  Trendelenburg Test: positive  Log Roll: negative    Other   Sensation: normal  Left Hip Exam   Left hip exam is normal.      Back (L-Spine & T-Spine) / Neck (C-Spine) Exam     Back (L-Spine & T-Spine) Tests   Right Side Tests  Squat Test: able to perform      Muscle Strength   Right Lower Extremity   Hip Abduction: 5/5   Hip Adduction: 5/5   Quadriceps:  5/5   Hamstrin/5 (pain present)   Ankle Dorsiflexion:  5/5     Vascular Exam     Right Pulses  Right DP grade: normal.  Right PT grade: normal.        Capillary Refill  Right Hand: normal capillary refill        Edema  Right Upper Leg: absent  Right Lower Leg: absent            Assessment:     Status: AT - Cleared to Exert    Date Seen:  2024    Date of Injury:  2024    Date Out:  n/a    Date Cleared:  n/a        Treatment/Rehab/Maintenance:       [facility]       Name: Josue Campos  [facility] Number: [id number]    Initial Injury Date: 2024  Initial Injury Evaluation: 2024    Subjective     Pt stated he started to have hamstring pain after getting hit in the Right hamstring/knee at practice but feels it could be due to not warming up before weight lifting but cannot remember a specific time it hurt during weights. Pt stated pain feels sharp at times but mostly dull and achy. Pt stated he feels like his pain comes and goes.      Pain: 6/10  Location: Hamstring medially     Objective     Observation: No ecchymosis, no swelling, no deformity present    Palpation: Palpably tight      ROM: Noted above in AT session  Strength: Noted above in AT session    Special Testing: Noted above in AT session    Neuro/Vascular: No neuro symptoms present    Assessment     Hamstring strain grade 1 semimembrinosis/semitendinosis    Differential: contusion of the hamstring tendon       Plan     Short Term: Hamstring cupping completed for 10mins to decrease tightness (not in area of pain)  further up hamstring    Long Term: Start rehab and treatment in [place] to decrease hamstring pain.    Anna Blanchard MSAT, LAT, ATC     [employer] [facility]         Plan:       1. Start rehab and treatment in [facility] to decrease hamstring pain.  2. Physician Referral: no  3. ED Referral:no  4. Parent/Guardian Notified: No  5. All questions were answered, ath. will contact me for questions or concerns in  the interim.  6.         Eligible to use School Insurance: Yes

## 2024-09-17 ENCOUNTER — ATHLETIC TRAINING SESSION (OUTPATIENT)
Dept: SPORTS MEDICINE | Facility: CLINIC | Age: 24
End: 2024-09-17
Payer: COMMERCIAL

## 2024-09-17 DIAGNOSIS — M79.671 RIGHT FOOT PAIN: Primary | ICD-10-CM

## 2024-09-17 DIAGNOSIS — S76.311D HAMSTRING STRAIN, RIGHT, SUBSEQUENT ENCOUNTER: ICD-10-CM

## 2024-09-17 NOTE — PROGRESS NOTES
"Reason for Encounter Follow-Up    Subjective:       Chief Complaint: Josue Campos is a 24 y.o. male student at [employer] who had concerns including Pain of the Right Foot, Pain of the Right Lower Leg, and right hamstring pain.    Handedness: right-handed  Sport played: basketball      Level: [employer]            Pain        Review of Systems   All other systems reviewed and are negative.                Objective:       General: Josue is well-developed, well-nourished, appears stated age, in no acute distress, alert and oriented to time, place and person.     AT Session          Assessment:     Status: AT - Cleared to Exert    Date Seen:  09/17/2024    Date of Injury:  09/16/2024 right knee, Chronic R foot    Date Out:  n/a    Date Cleared:  n/a        Treatment/Rehab/Maintenance:   Josue completed therapeutic exercises to develop strength and ROM:    Date 09/19/2024       Exercise Sets/Reps Weight Sets/Reps Weight Sets/Reps Weight Sets/Reps Weight Sets/Reps Weight   Sciatic nerve glides 1x15            Glute set 5x10"            Abduction raises 3x8 3sec hold            IR/ER Iso hold 5x10"                                                                               Subjective:  Date:9/17/2024  Pt stated he feels the same. Pt stated taping knee helped decrease pain   Spoke with Patient PT/AT to discuss foot pain. Hector Braun stated he has been seeing him twice a week but wants to make sure he is continuing his corrective exercises on his own.    Date:9/19/2024  Pt stated hamstring pain is still present but he does not feel it until after practice.       Plan:       1. Continue rehab and treatment in [facility] to decrease pain and   2. Physician Referral: no  3. ED Referral:no  4. Parent/Guardian Notified: No  5. All questions were answered, ath. will contact me for questions or concerns in  the interim.  6.         Eligible to use School Insurance: Yes                  "

## 2024-09-18 ENCOUNTER — TELEPHONE (OUTPATIENT)
Dept: SPORTS MEDICINE | Facility: CLINIC | Age: 24
End: 2024-09-18
Payer: COMMERCIAL

## 2024-09-18 DIAGNOSIS — M79.662 PAIN IN LEFT LOWER LEG: ICD-10-CM

## 2024-09-18 RX ORDER — MELOXICAM 15 MG/1
15 TABLET ORAL DAILY
Qty: 30 TABLET | Refills: 0 | Status: SHIPPED | OUTPATIENT
Start: 2024-09-18

## 2024-09-18 NOTE — TELEPHONE ENCOUNTER
Athletic Training Room Note 09/18/2024  [facility]    : Anna Blanchard      Physician: Jaxson Gtz DO      CC: Right foot pain     HPI:   Josue is here today to be checked on for his right foot pain.  He states that his pain is not so bad today and he is dealing with more discomfort at the distal medial right thigh.  He has been able to be a full participant without restriction.      Physical Exam:  ANKLE: right   The affected ankle is compared to the contralateral ankle.    Observation:    There is no edema, erythema, or ecchymosis.   Shoes reveal a normal wear pattern.  No structural deformities including pes planus/cavus, hallux valgus, or toe deformities.   Negative too-many toes sign.  Normal callus pattern on the feet bilaterally.    Achilles tendon and calcaneal insertion reveals no deformities  No leg or intrinsic foot muscle atrophy.  Squatting reveals symmetric pronation of the bilateral feet.   Able to rise on toes with symmetric supination.    Normal gait without evidence of antalgia.    ROM: (expected degree)  Active dorsiflexion to 20° bilaterally.    Active plantarflexion to 50° bilaterally.    Active ankle inversion to 35° bilaterally.    Active ankle eversion to 15° bilaterally.    Full active flexion/extension of the toes bilaterally.   Heel cords without tightness bilaterally.    Tenderness To Palpation:  No tenderness at the ATFL, CFL, PTFL, or deltoid ligaments  No tenderness over the distal anterior syndesmosis, distal tibia/fibula, fibular head/shaft  No tenderness at medial or lateral malleoli   No tenderness at navicular, cuboid, cuneiforms, talus, or calcaneous  No tenderness along the metatarsals or phalanges  No tenderness at the Achilles tendon calcaneal insertion  No tenderness at the tibialis posterior, flexor digitorum longus, flexor hallucis longus   No tenderness at the peroneal tendons  + tenderness over the distal hamstring tendons at the right  medial knee    Strength Testing:  Dorsiflexion - 5/5  Platarflexion - 5/5  Resisted Inversion - 5/5  Resisted Eversion - 5/5  Great Toe Extension - 5/5  Great Toe Flexion - 5/5    Special Tests:  Anterior talar drawer - negative and without dimpling  Talar tilt - negative    Heel tap test - negative  Distal tib/fib squeeze test - negative  Valdez squeeze test - negative    Metatarsal squeeze test - negative  Midfoot stress test - negative  Calcaneal squeeze test - negative      No subluxation of the peroneal tendons with resisted eversion.    Vascular/Sensory Exam:  DP and PT pulses intact BL  No skin changes, no abnormal hair distribution  Sensation intact to light touch throughout the saphenous, sural, superficial peroneal, deep peroneal, and tibial nerve distributions  Capillary refill intact <2 seconds in all toes bilaterally.      Assessment:  Right foot pain  Distal hamstring pain, right      Plan:     Josue is overall doing fine and is  wanted him to check in as his foot was bothering him on and off.  His exam today is reassuring and it is most likely that we need to keep focusing on exercises, rehab, athletic training room modalities as he continues to play this season.    Medications - Mobic 15 mg     Exercises - continue with core and pelvic stability, add in hamstring rehab    Referrals - none at this time    Imaging - none at this time, consider diagnostic ultrasound of hamstring and/or MRI if symptoms worsen    ATR - Sport Participation: Full sport participation    Follow up - as needed            This note was completed in the presence of the physician noted above    This note is dictated using the M*Modal Fluency Direct word recognition program. There are word recognition mistakes that are occasionally missed on review.

## 2024-09-23 ENCOUNTER — ATHLETIC TRAINING SESSION (OUTPATIENT)
Dept: SPORTS MEDICINE | Facility: CLINIC | Age: 24
End: 2024-09-23
Payer: COMMERCIAL

## 2024-09-23 DIAGNOSIS — M79.671 RIGHT FOOT PAIN: ICD-10-CM

## 2024-09-23 DIAGNOSIS — S76.311D HAMSTRING STRAIN, RIGHT, SUBSEQUENT ENCOUNTER: ICD-10-CM

## 2024-09-23 DIAGNOSIS — M79.662 PAIN IN LEFT LOWER LEG: Primary | ICD-10-CM

## 2024-09-23 NOTE — PROGRESS NOTES
"Reason for Encounter Follow-Up    Subjective:       Chief Complaint: Josue Campos is a 24 y.o. male student at [employer] who had concerns including Pain of the Right Foot, Pain of the Right Lower Leg, and Right Hamstring Pain.    Handedness: right-handed  Sport played: basketball      Level: college            Pain      ROS              Objective:       General: Josue is well-developed, well-nourished, appears stated age, in no acute distress, alert and oriented to time, place and person.     AT Session          Assessment:     Status: AT - Cleared to Exert    Date Seen:  09/23/2024,09/24/2024,9/25/2024,9/26/2024,09/27/2024    Date of Injury:   R Hamstring 9/23/2024 , R Shin Chronic,R foot Chronic    Date Out:  n/a    Date Cleared:  n/a        Treatment/Rehab/Maintenance:   Josue completed therapeutic exercises to develop strength and ROM:  Date 09/24/2024 9/26/2024      Exercise Sets/Reps Weight Sets/Reps Weight Sets/Reps Weight Sets/Reps Weight Sets/Reps Weight   Sciatic nerve glides 1x15  Cont.          Glute set 5x10"  Cont.          Abduction raises 3x8 3sec hold  Cont.          IR/ER Iso hold 5x10"  Cont.                                                                             Subjective:  Date: 9/23/24  Manual therapy only completed on R foot.   Hector Braun patient AT/PT stated he believes his hamstring pain can be contributed but Pes anserine Bursitis.    Date: 9/24/2024  Pt stated he is having a lot of foot pain practice modified no contact     Date: 9/25/2024  Pt stated his foot is in a lot of pain today but did not complete treatment due to not scheduling an appointment. Pt stated he would let me know how he feels tomorrow morning.     Date: 9/26/2024  Pt stated his foot was feeling better did not tape foot for practice today. but his hamstring is feeling very "sticky" today but not painful during practice    Date: 9/27/2024  Pt stated he is doing okay with moderate R foot pain no R shin pain and " R  Hamstring/knee pain present but not during practice.       Josue received the following modalities and/or manual therapy techniques:  Date: 9/23/2024  Joint Mobilization Big toe mobs, Lymphatic massage foot 5mins  Duration: 5mins       Plan:       1. Continue rehab and treatment in Atf to decrease hamstring and foot pain.   2. Physician Referral: no  3. ED Referral:no  4. Parent/Guardian Notified: No  5. All questions were answered, ath. will contact me for questions or concerns in  the interim.  6.         Eligible to use School Insurance: Yes

## 2024-10-01 ENCOUNTER — ATHLETIC TRAINING SESSION (OUTPATIENT)
Dept: SPORTS MEDICINE | Facility: CLINIC | Age: 24
End: 2024-10-01
Payer: COMMERCIAL

## 2024-10-01 DIAGNOSIS — S76.311D HAMSTRING STRAIN, RIGHT, SUBSEQUENT ENCOUNTER: Primary | ICD-10-CM

## 2024-10-01 NOTE — PROGRESS NOTES
"Reason for Encounter N/A    Subjective:       Chief Complaint: Josue Campos is a 24 y.o. male student at [employer] who had concerns including Pain of the Right Foot, Pain of the Right Lower Leg, and Right Hamstring/knee pain.    Handedness: right-handed  Sport played: basketball      Level: college            Pain      ROS              Objective:       General: Josue is well-developed, well-nourished, appears stated age, in no acute distress, alert and oriented to time, place and person.     AT Session          Assessment:     Status: AT - Cleared to Exert    Date Seen:  09/30/2024, 10/01/2024, 10/03/2024    Date of Injury:  n/a    Date Out:  n/a    Date Cleared:  n/a        Treatment/Rehab/Maintenance:   Josue completed therapeutic exercises to develop strength and ROM:    Date 9/30/2024 10/01/2024 10/03/2024     Exercise Sets/Reps Weight Sets/Reps Weight Sets/Reps Weight Sets/Reps Weight Sets/Reps Weight   Heel Digs 0o02h89" hold  Cont.  Cont.        Standing Isometrics 2y99c97" hold  Cont.  Cont.        SL RDL 3x10 8kg Cont. Cont. Cont. Cont.       Static Hip Thrust/Adductor Squeeze 2x10x5" hold  Cont.  Cont.        [place] hamstrings 3x8  Cont.  Cont.        Hamstring stretch 3x30"  Cont.  Cont.        Calf stretch 3x30"  Cont.  Cont.        Adductor stretch 3x30"  Cont.  Cont.                       Subjective:  Date: 9/30/24  Hamstring rehab completed today     Date: 10/1/2024  Hamstring rehab completed today  Pt stated toe felt good today and Carrillo felt good today    Date: 10/03/2024  Hamstring rehab completed      Plan:       1. Continue rehab in ATF to decrease hamstring pain and increase hamstring strength  2. Physician Referral: no  3. ED Referral:no  4. Parent/Guardian Notified: No  5. All questions were answered, ath. will contact me for questions or concerns in  the interim.  6.         Eligible to use School Insurance: Yes                  "

## 2024-10-07 ENCOUNTER — ATHLETIC TRAINING SESSION (OUTPATIENT)
Dept: SPORTS MEDICINE | Facility: CLINIC | Age: 24
End: 2024-10-07
Payer: COMMERCIAL

## 2024-10-07 DIAGNOSIS — M79.671 RIGHT FOOT PAIN: ICD-10-CM

## 2024-10-07 DIAGNOSIS — S76.311D HAMSTRING STRAIN, RIGHT, SUBSEQUENT ENCOUNTER: Primary | ICD-10-CM

## 2024-10-07 NOTE — PROGRESS NOTES
"Reason for Encounter Follow-Up    Subjective:       Chief Complaint: Josue Campos is a 24 y.o. male student at [employer] who had concerns including Pain of the Right Foot, Right knee pain, and Right Hamstring pain.    Handedness: right-handed  Sport played: basketball      Level: college            Pain        ROS              Objective:       General: Josue is well-developed, well-nourished, appears stated age, in no acute distress, alert and oriented to time, place and person.     AT Session          Assessment:     Status: AT - Cleared to Exert    Date Seen:  10/07/2024, 10/09/2024    Date of Injury:  n/a    Date Out:  n/a    Date Cleared:  n/a        Treatment/Rehab/Maintenance:     Josue completed therapeutic exercises to develop strength and ROM:    Date 10/07/2024 10/09/2024      Exercise Sets/Reps Weight Sets/Reps Weight Sets/Reps Weight Sets/Reps Weight Sets/Reps Weight   Heel Digs 5z35h41" hold  Cont.          Standing Isometrics 3b38x70" hold  Cont.          SL RDL 3x10 8kg Cont. Cont.         Static Hip Thrust/Adductor Squeeze 2x10x5" hold  Cont.          [place] hamstrings 3x8  Cont.          Hamstring stretch 3x30"  Cont.          Calf stretch 3x30"  Cont.          Adductor stretch 3x30"  Cont.            Subjective:  Date: 10/07/2024  Rehab completed for hamstring/knee  Pt stated he had right knee pain under the patella(patellar tendon) and pes anserine insertion today. Evaluation completed. Moving cups on hamstrings. Foot massage over foot pain       Date: 10/09/2024  Rehab completed pt stated he still has some pain but it is better than Monday.      Josue received the following modalities and/or manual therapy techniques:  Date: 10/07/2024  Cupping  Duration: Moving cups 5mins     Date: 10/09/2024  Cupping  Duration: moving cups 5mins     Plan:       1. Continue rehab in ATF to decrease knee pain and increase hamstring strength   2. Physician Referral: no  3. ED Referral:no  4. Parent/Guardian " Notified: No  5. All questions were answered, ath. will contact me for questions or concerns in  the interim.  6.         Eligible to use School Insurance: Yes

## 2024-10-17 ENCOUNTER — ATHLETIC TRAINING SESSION (OUTPATIENT)
Dept: SPORTS MEDICINE | Facility: CLINIC | Age: 24
End: 2024-10-17
Payer: COMMERCIAL

## 2024-10-17 DIAGNOSIS — R69 ILLNESS: Primary | ICD-10-CM

## 2024-10-21 DIAGNOSIS — R11.0 NAUSEA: Primary | ICD-10-CM

## 2024-10-21 RX ORDER — ONDANSETRON 4 MG/1
4 TABLET, FILM COATED ORAL EVERY 8 HOURS PRN
Qty: 12 TABLET | Refills: 0 | Status: SHIPPED | OUTPATIENT
Start: 2024-10-21

## 2024-10-21 NOTE — PROGRESS NOTES
Return of nausea since it had resolved over the weekend with associated new onset constipation. Mild stomach cramping as well.    Advised to stop lomotil.    Zofran prescribed for nausea.

## 2024-10-30 ENCOUNTER — ATHLETIC TRAINING SESSION (OUTPATIENT)
Dept: SPORTS MEDICINE | Facility: CLINIC | Age: 24
End: 2024-10-30
Payer: COMMERCIAL

## 2024-10-30 ENCOUNTER — TELEPHONE (OUTPATIENT)
Dept: SPORTS MEDICINE | Facility: CLINIC | Age: 24
End: 2024-10-30
Payer: COMMERCIAL

## 2024-10-30 DIAGNOSIS — M25.561 ACUTE PAIN OF RIGHT KNEE: Primary | ICD-10-CM

## 2024-10-30 DIAGNOSIS — S86.811A PATELLAR TENDON STRAIN, RIGHT, INITIAL ENCOUNTER: ICD-10-CM

## 2024-10-30 DIAGNOSIS — S89.91XA RIGHT KNEE INJURY, INITIAL ENCOUNTER: ICD-10-CM

## 2024-10-30 NOTE — PROGRESS NOTES
Reason for Encounter New Injury    Subjective:       Chief Complaint: Josue Campos is a 24 y.o. male student at [employer] who had concerns including Pain of the Right Knee.    Handedness: right-handed  Sport played: basketball      Level: college            Pain  This is a new problem.       Review of Systems   All other systems reviewed and are negative.                Objective:       General: Josue is well-developed, well-nourished, appears stated age, in no acute distress, alert and oriented to time, place and person.             Right Ankle/Foot Exam     Tests   Heel Walk: able to perform  Tiptoe Walk: able to perform  Single Heel Rise: able to perform  Double Heel Rise: able to perform      Right Knee Exam     Inspection   Swelling: present  Effusion: absent  Deformity: absent  Bruising: absent    Tenderness   The patient is tender to palpation of the patellar tendon.    Range of Motion   Extension:  abnormal Right knee extension grade: WNL P! present.  Flexion:  abnormal Right knee flexion: WNL P! present.    Tests   Meniscus   aMrie:  Medial - negative Lateral - negative  Ligament Examination   Lachman: normal (-1 to 2mm)   PCL-Posterior Drawer: normal (0 to 2mm)     MCL - Valgus: normal (0 to 2mm)  LCL - Varus: normal  Pivot Shift: normal (Equal)  Reverse Pivot Shift: normal (Equal)  Dial Test at 30 degrees: normal (< 5 degrees)  Dial Test at 90 degrees: normal (< 5 degrees)  Posterior Sag Test: negative  Posterolateral Corner: unstable (>15 degrees difference)  Patella   Patellar apprehension: negative  Patellar Grind: negative  J-Sign: none    Other   Muscle Tightness: hamstring tightness and quadriceps tightness  Sensation: normal  Apley Grind Test: negative  Home Bounce Test: negative  Thessaly's Test: negative    Left Knee Exam   Left knee exam is normal.    Right Hip Exam     Tests   Bibiana: negative  Back (L-Spine & T-Spine) / Neck (C-Spine) Exam     Back (L-Spine & T-Spine) Tests   Right Side  Tests  Squat Test: unable to perform      Muscle Strength   Right Lower Extremity   Hip Abduction: 4/5 and 5/5   Quadriceps:  3/5 (p! present)   Hamstrin/5     Vascular Exam     Right Pulses  Right DP grade: normal.  Right PT grade: normal.        Edema  Right Lower Leg: absent            Assessment:     Status: O - Out    Date Seen:  10/29/2024    Date of Injury:  10/29/2024    Date Out:  10/29/2024    Date Cleared:  n/a      Treatment/Rehab/Maintenance:         [facility]       Name: Josue Campos  Clinic Number: [id number]    Initial Injury Date: 10/29/2024  Initial Injury Evaluation: 10/29/2024    Subjective     Pt stated when he was in the game during the first half he felt a sharp pain in his knee but kept playing. During the second half of the game he was unable to continue. Pt stated he didn't feel a pop pr crack only a sharp pain after planting his foot to change directions during the game after catching a ball      Pain: 8/10  Location: anterior knee    Objective     Observation: No ecchymosis present, Swelling present, No deformity present      Palpation: TTP patellar tendon, Quad    ROM: WNL P! Present (Noted in AT session above)    Strength: Decreased due to pain (Noted in AT session above)    Special Testing: (Noted in AT session above)     Neuro/Vascular: None present    Assessment     Patellar tendon strain   Differential: Patellar Tendon tear, ACL sprain/Tear, Fat pad impingement      Plan     Short Term: Decrease inflammation w/ ice for 20 mins and tylenol to help with the pain    Long Term: refer to team physician for further evaluation.    Anna Blanchard MSAT, LAT, ATC     [employer] [facility]       Plan:       1. Refer to Dr. Yan Gtz for evaluation    2. Physician Referral: yes  3. ED Referral:no  4. Parent/Guardian Notified: No  5. All questions were answered, ath.  will contact me for questions or concerns in  the interim.  6.         Eligible to use School Insurance: Yes

## 2024-11-01 ENCOUNTER — HOSPITAL ENCOUNTER (OUTPATIENT)
Dept: RADIOLOGY | Facility: HOSPITAL | Age: 24
Discharge: HOME | End: 2024-11-01
Attending: ORTHOPAEDIC SURGERY
Payer: COMMERCIAL

## 2024-11-01 DIAGNOSIS — M25.561 ACUTE PAIN OF RIGHT KNEE: ICD-10-CM

## 2024-11-01 PROCEDURE — 73721 MRI JNT OF LWR EXTRE W/O DYE: CPT | Mod: TC,RT

## 2024-11-01 PROCEDURE — 73721 MRI JNT OF LWR EXTRE W/O DYE: CPT | Mod: 26,RT,, | Performed by: RADIOLOGY

## 2024-11-02 ENCOUNTER — ATHLETIC TRAINING SESSION (OUTPATIENT)
Dept: SPORTS MEDICINE | Facility: CLINIC | Age: 24
End: 2024-11-02
Payer: COMMERCIAL

## 2024-11-02 DIAGNOSIS — M25.561 ACUTE PAIN OF RIGHT KNEE: Primary | ICD-10-CM

## 2024-11-02 NOTE — PROGRESS NOTES
Reason for Encounter Follow-Up    Subjective:       Chief Complaint: Josue Campos is a 24 y.o. male student at [employer] who had concerns including Pain of the Right Knee.    Handedness: right-handed  Sport played: basketball      Level: college            Pain        ROS              Objective:       General: Josue is well-developed, well-nourished, appears stated age, in no acute distress, alert and oriented to time, place and person.     AT Session          Assessment:     Status: O - Out    Date Seen:  11/01/2024,11/02/2024    Date of Injury:  10/30/2024    Date Out:  10/30/2024    Date Cleared:  n/a        Treatment/Rehab/Maintenance:   Josue completed therapeutic exercises to develop strength:    Date 11/02/2024       Exercise Sets/Reps Weight Sets/Reps Weight Sets/Reps Weight Sets/Reps Weight Sets/Reps Weight   SLR VMO 3x12            Side lying Hip add 3x8            Side lying Hip Abd 3x8            4 way hip standing 3x8            SL Calf Raises 3x15            External hip rotations X                                                     Subjective:  Date: 11/01/2024  Communication with Dr. Yan Gtz, Josue and Parents  Discussion was had with Josue's parents, himself Dr. Yan gtz and AT [facility].  Dr. Yan Gtz explained to parents the process of getting an MRI and how insurance needs to be authorized first. Josue's return to play and despite getting the MRI soon that would not clear him for activity right away. Pt's appointment was scheduled for the following day.         Estim and Ice completed today   KT tape applied to knee    Date: 11/02/2024  Rehab completed. Pt stated he is doing well but still has pain but nothing compared to the initial start of pain.   No treatment    Josue received the following modalities and/or manual therapy techniques:  Date: 11/01/2024  E-Stim: Premod and Ice Pack  Duration: 15mins     Date: 11/02/2024  None. Pt unable to complete manual therapy due  to another appointment.        Plan:       1. Continue treatment and rehab in ATF to strengthen surrounding knee muscles. Pt scheduled to see Dr. Gtz in clinic at 11am Monday 11/4/2024  2. Physician Referral: yes  3. ED Referral:no  4. Parent/Guardian Notified: No  5. All questions were answered, ath. will contact me for questions or concerns in  the interim.  6.         Eligible to use School Insurance: Yes

## 2024-11-04 ENCOUNTER — OFFICE VISIT (OUTPATIENT)
Dept: SPORTS MEDICINE | Facility: CLINIC | Age: 24
End: 2024-11-04
Payer: COMMERCIAL

## 2024-11-04 VITALS — HEIGHT: 77 IN | WEIGHT: 216.25 LBS | BODY MASS INDEX: 25.53 KG/M2

## 2024-11-04 DIAGNOSIS — M76.52 PATELLAR TENDINITIS OF LEFT KNEE: ICD-10-CM

## 2024-11-04 DIAGNOSIS — M25.562 ACUTE PAIN OF LEFT KNEE: Primary | ICD-10-CM

## 2024-11-04 DIAGNOSIS — M76.52 JUMPER'S KNEE, LEFT: ICD-10-CM

## 2024-11-04 PROCEDURE — 1160F RVW MEDS BY RX/DR IN RCRD: CPT | Mod: CPTII,S$GLB,, | Performed by: ORTHOPAEDIC SURGERY

## 2024-11-04 PROCEDURE — 99999 PR PBB SHADOW E&M-EST. PATIENT-LVL III: CPT | Mod: PBBFAC,,, | Performed by: ORTHOPAEDIC SURGERY

## 2024-11-04 PROCEDURE — 99214 OFFICE O/P EST MOD 30 MIN: CPT | Mod: S$GLB,,, | Performed by: ORTHOPAEDIC SURGERY

## 2024-11-04 PROCEDURE — 3008F BODY MASS INDEX DOCD: CPT | Mod: CPTII,S$GLB,, | Performed by: ORTHOPAEDIC SURGERY

## 2024-11-04 PROCEDURE — 1159F MED LIST DOCD IN RCRD: CPT | Mod: CPTII,S$GLB,, | Performed by: ORTHOPAEDIC SURGERY

## 2024-11-04 RX ORDER — MELOXICAM 15 MG/1
15 TABLET ORAL DAILY
Qty: 30 TABLET | Refills: 1 | Status: SHIPPED | OUTPATIENT
Start: 2024-11-04

## 2024-11-04 NOTE — PROGRESS NOTES
"CC: right knee pain    24 y.o. Male presents today for evaluation of his right knee pain. 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 He states he was participating in a basketball game when he felt as though his knee buckled upon landing followed by immediate pain around the patellar tendon/patellar attachment. He is improved by about 60% today compared to the day after the injury and has been wearing his knee sleeve.     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 No pertinent past medical history.    PAST SURGICAL HISTORY:   History reviewed. No pertinent surgical history.    FAMILY HISTORY:   No family history on file.    SOCIAL HISTORY:   Social History     Socioeconomic History    Marital status: Single       MEDICATIONS:     Current Outpatient Medications:     meloxicam (MOBIC) 15 MG tablet, Take 1 tablet (15 mg total) by mouth once daily., Disp: 30 tablet, Rfl: 1    ondansetron (ZOFRAN) 4 MG tablet, Take 1 tablet (4 mg total) by mouth every 8 (eight) hours as needed for Nausea., Disp: 12 tablet, Rfl: 0    ALLERGIES:   Review of patient's allergies indicates:  No Known Allergies     PHYSICAL EXAMINATION:  Ht 6' 5" (1.956 m)   Wt 98.1 kg (216 lb 4.3 oz)   BMI 25.65 kg/m²   Vitals signs and nursing note have been reviewed.  General: In no acute distress, well developed, well nourished, no diaphoresis  Eyes: EOM full and smooth, no eye redness or discharge  HENT: normocephalic and atraumatic, neck supple, trachea midline, no nasal discharge, no " external ear redness or discharge  Cardiovascular: 2+ and symmetric DP pulses bilaterally, no LE edema  Lungs: respirations non-labored, no conversational dyspnea   Abd: non-distended, no rigidity  MSK: no amputation or deformity, no swelling of extremities  Neuro: AAOx3, CN2-12 grossly intact  Skin: No rashes, warm and dry  Psychiatric: cooperative, pleasant, mood and affect appropriate for age    MUSCULOSKELETAL EXAM:    RIGHT KNEE EXAMINATION   Affected side is compared to contralateral knee     Observation:  No edema, erythema, ecchymosis, or effusion noted.  No muscle atrophy of the thighs and calves noted.  No obvious bony deformities noted.   No genu valgus/varum noted. No recurvatum noted.    No tibial internal/external torsion.    No pes planus/cavus.    Tenderness:   Patella - present     Lateral joint line - none  Quad tendon - none     Medial joint line - none  Patellar tendon - present, patellar attachment Medial plica - none  Tibial tubercle - none     Lateral plica - none  Pes anserine - none     MCL prox - none  Distal ITB - none     MCL distal - none  MFC - none      LCL prox - none  LFC - none      LCL distal - none  Tibia - none      Fibula - none    No obvious bursae, plicae, popliteal cysts, or tendon derangement palpated.          ROM:  Active extension to 0° on left without hyperextension, lag, crepitus, or patellar J sign.   Active extension to 0° on right without hyperextension, lag, or patellar J sign. Slight crepitus with extension  Active flexion to 135° on left and 135° on right.    Strength: (bilaterally) (*pain)  Knee Flexion - 5/5 on left and 5/5 on right  Knee Extension - 5/5 on left and 5/5 on right  Hip Flexion - 5/5 on left and 5/5 on right  Hip Extension - 5/5 on left and 5/5 on right  Ankle dorsiflexion - 5/5 on left and 5/5 on right  Ankle Plantarflexion - 5/5 on left and 5/5 on right    Patellofemoral Exam:  Patellar ballottement - negative  Bulge sign - negative  Patellar  "grind - negative  No patellar laxity with medial and lateral translation   No apprehension with medial and lateral patellar translation.     Meniscus Testing:     No pain with terminal extension and flexion.  Maries test - negative   Bounce home test - negative    Ligament Testing:  Lachman's test - negative  No laxity with anterior drawer.  No laxity with posterior drawer.    No laxity with varus testing at 0 and 30 degrees.  No laxity with valgus testing at 0 and 30 degrees.    IT Band Testing:  Noble Compression test - negative    Neurovascular Examination:   Normal gait without antalgia.  Sensation intact to light touch in the obturator, lateral/intermediate/medial/posterior femoral cutaneous, saphenous, and common peroneal nerves bilaterally.  Pulses intact at the DP and PT arteries bilaterally.    Capillary refill intact <2 seconds in all toes bilaterally.      IMAGIN. MRI obtained 2024 due to right knee pain   2. MRI images were reviewed personally by me and then directly with patient.  3. FINDINGS: MRI images obtained demonstrate focal proximal 3rd tendon thickening with increase in the AP diameter. Associated T2 hyperintensity involving the medial 3rd of the tendon predominantly centrally.  Minimal in the adjacent Hoffa's fat pad. Findings consistent with "jumper's knee" with small focal partial-thickness partial width insertional patellar tendon tear.  4. IMPRESSION: As above.       ASSESSMENT:      ICD-10-CM ICD-9-CM   1. Acute pain of left knee  M25.562 719.46   2. Patellar tendinitis of left knee  M76.52 726.64   3. Jumper's knee, left  M76.52 727.2         PLAN:  1-3. 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 He recalls participating in the game when he felt as though his knee buckled upon landing followed by pain around the patellar tendon/patella.     - MRI " obtained 11/01/2024 and images were personally reviewed with the patient. See above for further detail.    - He is about 60% overall improved and compared to his last evaluation.  He has been wearing a compression sleeve which he feels has helped.  He has not tried any basketball activity yet.    - We discussed options, including a progression back into basketball activity with bracing/stabilization and anti-inflammatories, shutting down for a short period of time for a PRP injection with rehab. As he is improving, it is very reasonable to allow him to try some basketball related activity today and tomorrow and see if symptoms remain improved or if they worsen.  He has also seen Dr. Hunt in the past and he and his parents are interested in being evaluated by him which we will set up for tomorrow.    - EDUCATION FOR PRP: Education provided on the benefits, adverse effects, likely course of treatment and improvement, and post-injection expectations from PRP. Handout given to patient. Reviewed that it is a non-covered cash service. It may takes several treatments to have long standing resolution. The PRP injection may include tenotomy, and this was explained to the patient. We reviewed that initially after treatment pain may become more intense and this is an expected response. We instructed that improvement may be experienced within the first two weeks and that most improvement will come between weeks 6 and 12. If there is no improvement, we would not likely repeat. If less than 90% improvement is experienced at 12 weeks, we would consider and discuss repeating.    - Mobic 15 mg refilled today.     - His  was present for the duration of the visit today and expressed understanding to the plan.      Future planning includes - Dr. Hunt's evaluation and discussion, pending practice activity today and tomorrow.    All questions were answered to the best of my ability and all concerns were  addressed at this time.    Follow up in ATR with me as needed.      This note is dictated using the M*Modal Fluency Direct word recognition program. There are word recognition mistakes that are occasionally missed on review.

## 2024-11-07 ENCOUNTER — ATHLETIC TRAINING SESSION (OUTPATIENT)
Dept: SPORTS MEDICINE | Facility: CLINIC | Age: 24
End: 2024-11-07
Payer: COMMERCIAL

## 2024-11-07 DIAGNOSIS — M25.561 ACUTE PAIN OF RIGHT KNEE: Primary | ICD-10-CM

## 2024-11-07 DIAGNOSIS — R10.32 LEFT GROIN PAIN: ICD-10-CM

## 2024-11-08 NOTE — PROGRESS NOTES
Reason for Encounter Follow-Up    Subjective:       Chief Complaint: Josue Campos is a 24 y.o. male student at [employer] who had concerns including Pain of the Right Knee.    Handedness: right-handed  Sport played: basketball      Level: college            Pain        ROS              Objective:       General: Josue is well-developed, well-nourished, appears stated age, in no acute distress, alert and oriented to time, place and person.     AT Session          Assessment:     Status: AT - Cleared to Exert    Date Seen:  11/04/2024 -11/09/2024    Date of Injury:  10/29/2024    Date Out:  10/30/2024    Date Cleared:  11/04/2024        Treatment/Rehab/Maintenance:     Josue completed therapeutic exercises to develop strength:    Date 11/04/2024       Exercise Sets/Reps Weight Sets/Reps Weight Sets/Reps Weight Sets/Reps Weight Sets/Reps Weight   SLR VMO 3x12            Side lying Hip add 3x8            Side lying Hip Abd 3x8            4 way hip standing 3x8            SL Calf Raises 3x15            External hip rotations X                                                     Subjective:  Date: 11/04/2024  Pt stated he is doing well pain after practice only achy nothing similar to pain before. Rehab completed today     Date: 11/7/2024  Completed rehab with Josh Painter    Strain treatment T[facility]AR treatment at [facility].     Date: 11/08/2024  Estim pre mod and Ice for 20mins completed for adductor pain after game.       Josue received the following modalities and/or manual therapy techniques:  Date: 11/4/2024  Cupping  Duration: 10mins R Quad     Date: 11/08/2024  E-Stim: Premod and Ice Pack  Duration: 20mins       Plan:       1. Continue rehab and treatment for knee pain to increase quad strength and decrease knee pain. Start rehab for adductor once pain has significantly decreased.   2. Physician Referral: no  3. ED Referral:no  4. Parent/Guardian Notified: No  5. All questions  were answered, ath. will contact me for questions or concerns in  the interim.  6.         Eligible to use School Insurance: Yes

## 2024-11-09 ENCOUNTER — ATHLETIC TRAINING SESSION (OUTPATIENT)
Dept: SPORTS MEDICINE | Facility: CLINIC | Age: 24
End: 2024-11-09
Payer: COMMERCIAL

## 2024-11-09 DIAGNOSIS — R10.32 LEFT GROIN PAIN: Primary | ICD-10-CM

## 2024-11-09 NOTE — PROGRESS NOTES
Reason for Encounter New Injury    Subjective:       Chief Complaint: Josue Campos is a 24 y.o. male student at [employer] who had concerns including Pain and Injury of the Pelvis (Left side) and Left Adductor Pain.    Handedness: right-handed  Sport played: basketball      Level: college            Pain    Injury        ROS              Objective:       General: Josue is well-developed, well-nourished, appears stated age, in no acute distress, alert and oriented to time, place and person.             Left Ankle/Foot Exam     Tests   Heel Walk: able to perform  Tiptoe Walk: able to perform  Single Heel Rise: able to perform  Double Heel Rise: able to perform      Right Hip Exam   Right hip exam is normal.   Left Hip Exam     Inspection   Swelling: absent  No deformity of hip.  Bruising: absent    Range of Motion   Abduction:  abnormal Left hip abduction: decreased due to pain.  Adduction:  abnormal Left hip adduction: decreased due to pain.  Extension:  abnormal Left hip extension: decreased due to pain.  Flexion:  abnormal Left hip flexion: decreased due to pain.  External rotation:  abnormal Left hip external rotation: decreased due to pain.  Internal rotation: abnormal Left hip internal rotation: decreased due to pain.    Tests   Resisted sit-up pain: positive (positive for pain)  Resisted sit-up pain with adductor contraction pain: positive  Unresisted sit-up pain: positive (positive for pain)    Other   Sensation: normal      Back (L-Spine & T-Spine) / Neck (C-Spine) Exam     Back (L-Spine & T-Spine) Tests   Left Side Tests  Squat: unable to perform      Muscle Strength   Left Lower Extremity   Hip Abduction: 3/5 (p! present)   Hip Adduction: 1/5 (p! present)   Hip Flexion: 3/5 (P! present)     Vascular Exam       Capillary Refill  Left Hand: normal capillary refill        Edema  Left Upper Leg: absent            Assessment:     Status: O - Out    Date Seen:  11/08/2024    Date of Injury:  11/07/2024    Date  Out:  11/08/2024    Date Cleared:  n/a        Treatment/Rehab/Maintenance:         [facility]       Name: Josue Campos  Clinic Number: [id number]    Initial Injury Date: 11/07/2024  Initial Injury Evaluation: 11/07/2024    Subjective     Pt stated when he was running during the game he felt a sharp pain in his groin after pushing off his left leg. No pop or crack was felt or heard when pain first started. PMH: R groin strain last semester. Pt stated this injury felt worse than the last time he hurt his groin.     Pt was able to complete game with Hip flexor wrap but stated he knew he would be out for the following game.       Pain: 8/10  Location: left adductor/groin     Objective     Observation: No ecchymosis, swelling, or deformity present     Palpation: Very TTP adductor palpably tight    ROM: Decreased due to pain    Strength: 2/5 (Noted in AT session above)    Special Testing: (Noted in AT session above)    Neuro/Vascular: None present    Assessment     Adductor strain grade 1      Plan     Short Term: Decrease pain with Estim pre mod and Ice for 20mins. Have pt continue Meloxicam pt is currently taking for knee pain          Anna MCCRAY, LAT, ATC     [facility] [employer] [facility]       Plan:       1. Decrease pain with Estim pre mod and Ice for 20mins. Have pt continue Meloxicam pt is currently taking for knee pain. Once pain has significantly decreased start rehab      2. Physician Referral: no  3. ED Referral:no  4. Parent/Guardian Notified: No  5. All questions were answered, ath. will contact me for questions or concerns in  the interim.  6.         Eligible to use School Insurance: Yes

## 2024-11-11 ENCOUNTER — ATHLETIC TRAINING SESSION (OUTPATIENT)
Dept: SPORTS MEDICINE | Facility: CLINIC | Age: 24
End: 2024-11-11
Payer: COMMERCIAL

## 2024-11-11 DIAGNOSIS — M25.561 ANTERIOR KNEE PAIN, RIGHT: ICD-10-CM

## 2024-11-11 DIAGNOSIS — R10.32 LEFT GROIN PAIN: Primary | ICD-10-CM

## 2024-11-11 NOTE — PROGRESS NOTES
"Reason for Encounter Follow-Up    Subjective:       Chief Complaint: Josue Campos is a 24 y.o. male student at [employer] who had concerns including Injury and Pain of the Pelvis (Left side).    Handedness: right-handed  Sport played: basketball      Level: college            Injury    Pain        ROS              Objective:       General: Josue is well-developed, well-nourished, appears stated age, in no acute distress, alert and oriented to time, place and person.     AT Session          Assessment:     Status: O - Out    Date Seen:  11/11/2024-11/14/2024    Date of Injury:  11/07/2024    Date Out:  11/08/2024    Date Cleared:  n/a        Treatment/Rehab/Maintenance:   Josue completed therapeutic exercises to develop strength, ROM, and core stabilization:    Date 11/11/2024 11/12/2024      Exercise Sets/Reps Weight Sets/Reps Weight Sets/Reps Weight Sets/Reps Weight Sets/Reps Weight   SLR VMO 3x12  Cont.          Side lying Hip add 3x8 R only    Cont.          Side lying Hip Abd 3x8 R only  Cont.          4 way hip standing X  Cont.          SL Calf Raises 3x15  Cont.          External hip rotations X  X          Hip Hikes X  3x8          Knee taps X  3x8          Superman X  3x30"          Plank rockers X  3x45"          Pelvic tilts X  2x10          Palloff Press X  2x10                         Subjective:  Date: 11/11/24  Full rehab  Ultrasound Knee  No treatment adductor  Feels better with compression     Date: 11/12/2024  Full knee and hip rehab   No adductor activation   No treatment competed due to prior appointment.    Date: 11/13/2024  Pt was seen by Hector Braun PT/AT determined he can start adductor activation and is not as bad as he was when I first evaluated him. Discussion had about pt returning to activity sooner than expected. Return to activity       Date: 11/14/2024  Pt seen by Hector Salas received the following modalities and/or manual therapy techniques:  Date: " 11/11/2024  Cupping and Ultrasound  Duration: Cups 10min quad Ultrasound: 3mhz .08 50% intensity 5mins    Date: 11/12/2024  none      Plan:       1. Continue rehab and treatment in [facility] and w/ Hector Braun to manage knee and adductor pain and increase adductor strength  2. Physician Referral: no  3. ED Referral:no  4. Parent/Guardian Notified: No  5. All questions were answered, ath. will contact me for questions or concerns in  the interim.  6.         Eligible to use School Insurance: Yes

## 2024-11-18 ENCOUNTER — ATHLETIC TRAINING SESSION (OUTPATIENT)
Dept: SPORTS MEDICINE | Facility: CLINIC | Age: 24
End: 2024-11-18
Payer: COMMERCIAL

## 2024-11-18 DIAGNOSIS — M25.561 RIGHT KNEE PAIN, UNSPECIFIED CHRONICITY: ICD-10-CM

## 2024-11-18 DIAGNOSIS — S76.012D STRAIN OF LEFT HIP ADDUCTOR MUSCLE, SUBSEQUENT ENCOUNTER: Primary | ICD-10-CM

## 2024-11-18 DIAGNOSIS — R10.32 LEFT GROIN PAIN: ICD-10-CM

## 2024-11-18 DIAGNOSIS — M25.562 ACUTE PAIN OF LEFT KNEE: ICD-10-CM

## 2024-11-18 RX ORDER — MELOXICAM 15 MG/1
15 TABLET ORAL DAILY
Qty: 30 TABLET | Refills: 1 | Status: SHIPPED | OUTPATIENT
Start: 2024-11-18

## 2024-11-18 NOTE — PROGRESS NOTES
Reason for Encounter Follow-Up    Subjective:       Chief Complaint: Josue Campos is a 24 y.o. male student at [employer] who had concerns including Injury and Pain of the Pelvis (Left side) and Left Groin Strain  (Adductor).    Handedness: right-handed  Sport played: basketball      Level: college            Injury    Pain        ROS              Objective:       General: Josue is well-developed, well-nourished, appears stated age, in no acute distress, alert and oriented to time, place and person.     AT Session          Assessment:     Status: AT - Cleared to Exert    Date Seen:  11/18/2024-11/24/2024    Date of Injury:  11/07/2024    Date Out:  11/08/2024    Date Cleared:  11/21/2024        Treatment/Rehab/Maintenance:     Josue completed therapeutic exercises to develop strength, ROM, and core stabilization:    Date 11/18/2024       Exercise Sets/Reps Weight Sets/Reps Weight Sets/Reps Weight Sets/Reps Weight Sets/Reps Weight   SLR VMO 3x10            Sidelying Hip add/abd 3x10            SL calf raise 3x15            Hip Hikes 3x10            Dead bugs 3x10            Pall off press 2x10                                                     Subjective:  Date: 11/18/2024  Rehab   Cupping cross friction on R knee Soft tissue massage adductor  Pt stated his groin is feeling a lot better and feels he can start practicing again. Pt will be gradually moved into practice. AT spoke with AT/PT Hector Braun to determine what is best for pt. Hector stated he agrees pt can gradually return to full practice     Date: 11/19/2024  Pt stated he was seeing Hector Braun PT/AT for treatment and rehab today   Pt stated he is doing well and thinks he can play in the game on Thursday. Pt completed non contact practice today    Date: 11/20/2024  Pt completed half court practice today  Seen by Hector Braun today    Date: 11/21/2024  Pt was permitted to play in 10 mins of the game. Pt stated he felt well.     Date:  11/24/2024  Treatment completed today  Pt stated he was seen by Hector Braun on 11/22/2024      Josue received the following modalities and/or manual therapy techniques:  Date: 11/18/2024  Active Release, Cupping, and Instrument-Assisted Soft Tissue Mobilization (IASTM)  Duration: 10mins     Date: 11/19/2024  None    Date: 11/20/2024  None    Date: 11/21/2024  none    Date: 11/24/2024  Instrument-Assisted Soft Tissue Mobilization (IASTM)  Duration: 3mins          Plan:       1. Continue treatment and rehab in [facility] as well as with Hector Braun to build adductor/core/quad strength to decrease pain  2. Physician Referral: no  3. ED Referral:no  4. Parent/Guardian Notified: No  5. All questions were answered, ath. will contact me for questions or concerns in  the interim.  6.         Eligible to use School Insurance: Yes

## 2024-11-30 ENCOUNTER — ATHLETIC TRAINING SESSION (OUTPATIENT)
Dept: SPORTS MEDICINE | Facility: CLINIC | Age: 24
End: 2024-11-30
Payer: COMMERCIAL

## 2024-11-30 DIAGNOSIS — M25.572 ACUTE LEFT ANKLE PAIN: Primary | ICD-10-CM

## 2024-11-30 NOTE — PROGRESS NOTES
Reason for Encounter New Injury    Subjective:       Chief Complaint: Josue Campos is a 24 y.o. male student at [employer] who had concerns including Pain and Injury of the Left Ankle.    Handedness: right-handed  Sport played: basketball      Level: college            Pain  This is a new problem. The current episode started in the past 7 days. The problem has been gradually improving. The treatment provided moderate relief.   Injury        Review of Systems   All other systems reviewed and are negative.                Objective:       General: Josue is well-developed, well-nourished, appears stated age, in no acute distress, alert and oriented to time, place and person.             Right Ankle/Foot Exam   Right ankle exam is normal.    Left Ankle/Foot Exam     Inspection  Deformity: absent  Erythema: absent  Bruising: Ankle - absent Foot - absent  Effusion: Ankle - present Foot - absent  Atrophy: Ankle - absent Foot - absent    Pain   The patient exhibits pain of the Achilles tendon and anterior talofibular ligament.    Swelling   The patient is swollen on the anterior talofibular ligament and lateral malleolus.    Tenderness   The patient is tender to palpation of the Achilles tendon.    Range of Motion   Ankle Joint  Dorsiflexion:  normal   Plantar flexion:  normal     Subtalar Joint   Inversion:  normal Left ankle inversion: pain present.  Eversion:  normal   Valdez Test:  normal  First MTP Joint: normal    Tests   Anterior drawer: negative  Varus tilt: negative  Heel Walk: able to perform  Tiptoe Walk: able to perform  Single Heel Rise: able to perform  External Rotation Test: negative  Squeeze Test: absent    Other   Foot Crepitus:  absent  Ankle Crepitus: present  Sensation: normal  Peroneal Subluxation: negative      Muscle Strength   Right Lower Extremity   EHL:  5/5  Left Lower Extremity   Anterior tibial:  5/5   Posterior tibial:  5/5   Left gastrocnemius-soleus strength: pain present.   Peroneal  muscle:  5/5   FDL: 5/5  EDL: 5/5  FHL: 5/5    Vascular Exam       Edema  Left Lower Leg: absent            Assessment:     Status: AT - Cleared to Exert    Date Seen:  11/30/2024    Date of Injury:  11/26/2024    Date Out:  n/a    Date Cleared:  n/a        Treatment/Rehab/Maintenance:       [facility]       Name: Josue Campos  Clinic Number: [id number]    Initial Injury Date: 11/26/2024  Initial Injury Evaluation: 11/30/2024    Subjective     Pt stated he rolled his ankle during the game in 11/26/24 pt stated he was able to keep playing but after the game his ankle began to throb. No crack or pop felt only sharp pain that went away after the initial injury. Pt stated has been sore the last few days but completed the rehab he was given and was able to practice yesterday. Pt stated it has mostly felt weak and has pain in his achilles.     Pain: 3/10 currently 7/10 after game  Location: left ankle    Objective     Observation: No swelling ecchymosis or deformity present     Palpation: TTP on lateral achilles    ROM: WNL     Strength: WNL minimal pain present     Special Testing: (Noted in AT session above)    Neuro/Vascular: no symptoms present    Assessment     Grade 1 Ankle Sprain     Differential: Achilles strain/tendonitis     Plan     Long Term: Continue rehab and tape to prevent further injury    Anna Blanchard MSAT, LAT, ATC     [employer] [facility]         Plan:       1. Continue rehab and tape to prevent further injury  2. Physician Referral: no  3. ED Referral:no  4. Parent/Guardian Notified: No  5. All questions were answered, ath. will contact me for questions or concerns in  the interim.  6.         Eligible to use School Insurance: Yes

## 2024-12-03 ENCOUNTER — ATHLETIC TRAINING SESSION (OUTPATIENT)
Dept: SPORTS MEDICINE | Facility: CLINIC | Age: 24
End: 2024-12-03

## 2024-12-03 DIAGNOSIS — M25.572 ACUTE LEFT ANKLE PAIN: Primary | ICD-10-CM

## 2024-12-03 NOTE — PROGRESS NOTES
Reason for Encounter New Injury    Subjective:       Chief Complaint: Josue Campos is a 24 y.o. male student at [employer] who had concerns including Injury and Pain of the Left Ankle.    Handedness: right-handed  Sport played: basketball      Level: college            Injury    Pain        Review of Systems   All other systems reviewed and are negative.                Objective:       General: Josue is well-developed, well-nourished, appears stated age, in no acute distress, alert and oriented to time, place and person.         General Musculoskeletal Exam   Gait: normal     Right Ankle/Foot Exam   Right ankle exam is normal.    Left Ankle/Foot Exam     Inspection  Deformity: absent  Bruising: Ankle - absent Foot - absent  Effusion: Ankle - present Foot - absent    Pain   The patient exhibits pain of the Achilles tendon, anterior talofibular ligament and calcaneofibular ligament.    Swelling   The patient is swollen on the anterior talofibular ligament and calcaneofibular ligament.    Tenderness   The patient is tender to palpation of the Achilles tendon, ATF and CF ligament.    Range of Motion   Ankle Joint  Dorsiflexion:  normal   Plantar flexion:  normal     Subtalar Joint   Inversion:  abnormal Left ankle inversion: P! present.  Eversion:  abnormal Left ankle eversion: P! present.  Valdez Test:  normal  First MTP Joint: normal    Tests   Anterior drawer: negative  Varus tilt: negative  Heel Walk: able to perform  Tiptoe Walk: able to perform  Single Heel Rise: able to perform  External Rotation Test: negative  Squeeze Test: absent    Other   Sensation: normal  Peroneal Subluxation: negative      Muscle Strength   Right Lower Extremity   EHL:  5/5  Left Lower Extremity   Anterior tibial:  5/5 (p! present)   Posterior tibial:  5/5   Gastrocsoleus:  5/5   Peroneal muscle:  5/5   FHL: 5/5    Vascular Exam       Left Pulses  Left DP grade: normal.  Left PT grade: normal.        Edema  Left Lower Leg:  absent            Assessment:     Status: L - Limited    Date Seen:  12/03/2024    Date of Injury:  12/02/2024    Date Out:  n/a    Date Cleared:  n/a        Treatment/Rehab/Maintenance:       [facility]       Name: Josue Campos  Clinic Number: [id number]    Initial Injury Date: 12/02/2024  Initial Injury Evaluation: 12/03/2024    Subjective     Pt stated he rolled his ankle again during the game last night. Pt stated this ankle sprain was a little worse than the previous ankle sprain. Pt stated he did not feel a pop or crack only that his foot rolled under him. Pt stated he applied KT tape to his ankle last night    Pt instructed to wear both tape and ankle brace for practice.     PMH multiple ankle sprains most recent last month left ankle      Pain: 6/10  Location: left ankle    Objective     Observation: Minimal Swelling no ecchymosis or deformity present      Palpation: TTP achilles tendon, under lateral malleolus, medial talus     ROM: WNL compared bilaterally     Strength: 5/5 pain present w/ inversion/eversion     Special Testing: (Noted in AT session above)    Neuro/Vascular: No symptoms present     Assessment     Lateral Ankle Sprain grade 1, Achilles tendonesis   Differential: Bone bruise (talus)     Plan   Short term: Lymphatic massage 5mins  Modified practice tomorrow no practice today  Long term: Continue rehab with AT and AT/PT Hector MCCRAY, LAT, ATC     [facility]     Plan:       1. Short term: Lymphatic massage 5mins  Long term: Continue rehab with AT and AT/PT Hector Braun  2. Physician Referral: no  3. ED Referral:no  4. Parent/Guardian Notified: No  5. All questions were answered, ath. will contact me for questions or concerns in  the interim.  6.         Eligible to use School Insurance: Yes

## 2024-12-04 DIAGNOSIS — M25.562 ACUTE PAIN OF LEFT KNEE: Primary | ICD-10-CM

## 2024-12-04 RX ORDER — MELOXICAM 15 MG/1
15 TABLET ORAL DAILY
Qty: 30 TABLET | Refills: 1 | Status: SHIPPED | OUTPATIENT
Start: 2024-12-04

## 2024-12-12 ENCOUNTER — ATHLETIC TRAINING SESSION (OUTPATIENT)
Dept: SPORTS MEDICINE | Facility: CLINIC | Age: 24
End: 2024-12-12
Payer: COMMERCIAL

## 2024-12-12 DIAGNOSIS — M54.2 NECK PAIN: Primary | ICD-10-CM

## 2024-12-12 NOTE — PROGRESS NOTES
Reason for Encounter New Injury    Subjective:       Chief Complaint: Josue Campos is a 24 y.o. male student at [employer] who had concerns including Pain of the Neck (Left side).    Handedness: right-handed  Sport played: basketball      Level: college            Pain        Review of Systems   All other systems reviewed and are negative.                Objective:       General: Josue is well-developed, well-nourished, appears stated age, in no acute distress, alert and oriented to time, place and person.             Back (L-Spine & T-Spine) / Neck (C-Spine) Exam     Tenderness   The patient is tender to palpation of the left SCM.     Neck (C-Spine) Range of Motion   Flexion:      MildLimited neck flexion numerical scale: P! present.  Extension:  MildLimited extension numerical scale: P! present.  Right Lateral Bend: abnormalNeck lateral bend right: p! present.  Left Lateral Bend: abnormalNeck lateral bend left: p! present.  Right Rotation: abnormalNeck rotation right: P! present.  Left Rotation: abnormalNeck rotation left: pain present.    Spinal Sensation   Right Side Sensation  C-Spine Level: normal   L-Spine Level: normal  S-Spine Level: normal  T-Spine Level: normal  Left Side Sensation  C-Spine Level: normal  L-Spine Level: normal  S-Spine Level: normal  T-Spine Level: normal    Neck (C-Spine) Tests   Spurling's Test   Left:  Negative  Right: negative  Cervical Distraction Test  Right:  Negative  Left:  negative      Vascular Exam     Right Pulses        Right carotid grade: normal.    Left Pulses        Left carotid grade: nomal.            Assessment:     Status: L - Limited    Date Seen:  12/10/2024    Date of Injury:  12/10/2024    Date Out:  n/a    Date Cleared:  n/a        Treatment/Rehab/Maintenance:         [facility]       [facility]       Name: Josue Campos  Clinic Number: [id number]    Initial Injury Date: 12/12/2024  Initial Injury Evaluation:  12/12/2024    Subjective     P! started when he woke up in the morning stated he slept bad. No crack or pop felt when moving neck. Only achy p! And limited movement. Pt able to complete half of practice before p! became too much and was unable to continue.       Pain: 4/10  Location: left neck    Objective     Observation: No swelling, deformity or ecchymosis     Palpation: TTP on STM left side    ROM:   AROM: Limited due to pain and stiffness  PROM: WNL pain present  Lateral flexion L&R  Flexion  Extension  Lateral rotation L&R    Strength:   [facility] L&R: 5/5 pn present  Scalenes L&R 5/5 pn present     Special Testing:   Spurlings: (-)  Distraction (-)  Lhermittes's sigh (-)  Cervil compression (-)    Neuro/Vascular: No symptoms present    Assessment     Neck Stiffness due to sleeping poorly  Differential: [facility] strain, Torticollis     Plan     Short Term:   Neck distraction and Muscle movement 5mins  Neck cupping 10mins  Egyptains 2x10  Wall angels: 2x10    Long Term: Continue exercises daily until pain decrease. Pt reminded to move as normal as possible to avoid neck stiffing up more. Pt informed to let ATC know if pain increases or sharp pain occurs with movement    Anna Blanchard MSAT, LAT, ATC     [facility] [place]       Plan:       1. Short Term:   Neck distraction and Muscle movement 5mins  Neck cupping 10mins  Egyptains 2x10  Wall angels: 2x10  Long Term: Continue exercises daily until pain decrease. Pt reminded to move as normal as possible to avoid neck stiffing up more. Pt informed to let ATC know if pain increases or sharp pain occurs with movement  2. Physician Referral: no  3. ED Referral:no  4. Parent/Guardian Notified: No  5. All questions were answered, ath. will contact me for questions or concerns in  the interim.  6.         Eligible to use School Insurance: No, not a school related injury

## 2025-01-10 ENCOUNTER — ATHLETIC TRAINING SESSION (OUTPATIENT)
Dept: SPORTS MEDICINE | Facility: CLINIC | Age: 25
End: 2025-01-10
Payer: COMMERCIAL

## 2025-01-10 DIAGNOSIS — M79.10 MUSCLE SORENESS: Primary | ICD-10-CM

## 2025-01-14 DIAGNOSIS — R05.1 ACUTE COUGH: Primary | ICD-10-CM

## 2025-01-14 DIAGNOSIS — J06.9 UPPER RESPIRATORY TRACT INFECTION, UNSPECIFIED TYPE: ICD-10-CM

## 2025-01-14 RX ORDER — AZITHROMYCIN 250 MG/1
TABLET, FILM COATED ORAL
Qty: 6 TABLET | Refills: 0 | Status: SHIPPED | OUTPATIENT
Start: 2025-01-14 | End: 2025-01-19

## 2025-01-14 NOTE — PROGRESS NOTES
ISAAC ANTONIO    Cough, worsening over the past few days. Productive. History of bronchitis in the past and symptoms appear similar. Minimal improvement if any with OTC medications.    Zpak prescribed. Will follow up in 1-2 days on campus to see how he's feeling.

## 2025-01-15 ENCOUNTER — ATHLETIC TRAINING SESSION (OUTPATIENT)
Dept: SPORTS MEDICINE | Facility: CLINIC | Age: 25
End: 2025-01-15
Payer: COMMERCIAL

## 2025-01-15 DIAGNOSIS — J06.9 UPPER RESPIRATORY TRACT INFECTION, UNSPECIFIED TYPE: Primary | ICD-10-CM

## 2025-01-15 NOTE — PROGRESS NOTES
Reason for Encounter New Injury    Subjective:       Chief Complaint: Josue Campos is a 24 y.o. male student at [employer] who had concerns including Illness.    Handedness: right-handed  Sport played: basketball      Level: college            Illness      ROS              Objective:       General: Josue is well-developed, well-nourished, appears stated age, in no acute distress, alert and oriented to time, place and person.     AT Session          Assessment:     Status: AT - Cleared to Exert    Date Seen:  01/13/2025-01/18/2025    Date of Injury:  01/12/2025    Date Out:  n/a    Date Cleared:  n/a        Treatment/Rehab/Maintenance:     Subjective:  Date: 1/13/2025  Subjective:Pt stated he has been sick since Saturday night and went to urgent care on Sunday and diagnosed him with an upper respiratory infection. He was given steroids, Zpack, Cough syrup and recommended to take Mucinex     Date: 1/14/2025  Subjective:Pt stated he was not doing much better. AT reached out to DR. Gtz and recommended he also take dayquil for his symptoms     Date: 1/15/2025  Subjective: Pt stated he is doing a little better but his cough is still present    Date: 01/18/2025  Subjective: Pt stated he is continuing to feel better.     Plan:       1. Continue medication as recommended   2. Physician Referral: yes  3. ED Referral:no  4. Parent/Guardian Notified: No  5. All questions were answered, ath. will contact me for questions or concerns in  the interim.  6.         Eligible to use School Insurance: Yes

## 2025-01-28 ENCOUNTER — OFFICE VISIT (OUTPATIENT)
Dept: SPORTS MEDICINE | Facility: CLINIC | Age: 25
End: 2025-01-28
Payer: COMMERCIAL

## 2025-01-28 ENCOUNTER — HOSPITAL ENCOUNTER (OUTPATIENT)
Dept: RADIOLOGY | Facility: HOSPITAL | Age: 25
Discharge: HOME | End: 2025-01-28
Attending: NEUROMUSCULOSKELETAL MEDICINE & OMM
Payer: COMMERCIAL

## 2025-01-28 VITALS
HEART RATE: 48 BPM | DIASTOLIC BLOOD PRESSURE: 73 MMHG | WEIGHT: 224 LBS | BODY MASS INDEX: 26.45 KG/M2 | HEIGHT: 77 IN | SYSTOLIC BLOOD PRESSURE: 126 MMHG

## 2025-01-28 DIAGNOSIS — S39.011A STRAIN OF ABDOMINAL MUSCLE, INITIAL ENCOUNTER: ICD-10-CM

## 2025-01-28 DIAGNOSIS — M25.551 HIP PAIN, RIGHT: Primary | ICD-10-CM

## 2025-01-28 DIAGNOSIS — S70.01XA CONTUSION OF RIGHT HIP, INITIAL ENCOUNTER: ICD-10-CM

## 2025-01-28 DIAGNOSIS — M25.551 HIP PAIN, RIGHT: ICD-10-CM

## 2025-01-28 PROCEDURE — 99999 PR PBB SHADOW E&M-EST. PATIENT-LVL III: CPT | Mod: PBBFAC,,, | Performed by: NEUROMUSCULOSKELETAL MEDICINE & OMM

## 2025-01-28 PROCEDURE — 3074F SYST BP LT 130 MM HG: CPT | Mod: CPTII,S$GLB,, | Performed by: NEUROMUSCULOSKELETAL MEDICINE & OMM

## 2025-01-28 PROCEDURE — 3008F BODY MASS INDEX DOCD: CPT | Mod: CPTII,S$GLB,, | Performed by: NEUROMUSCULOSKELETAL MEDICINE & OMM

## 2025-01-28 PROCEDURE — 73502 X-RAY EXAM HIP UNI 2-3 VIEWS: CPT | Mod: 26,RT,, | Performed by: RADIOLOGY

## 2025-01-28 PROCEDURE — 99214 OFFICE O/P EST MOD 30 MIN: CPT | Mod: S$GLB,,, | Performed by: NEUROMUSCULOSKELETAL MEDICINE & OMM

## 2025-01-28 PROCEDURE — 73502 X-RAY EXAM HIP UNI 2-3 VIEWS: CPT | Mod: TC,RT

## 2025-01-28 PROCEDURE — 1160F RVW MEDS BY RX/DR IN RCRD: CPT | Mod: CPTII,S$GLB,, | Performed by: NEUROMUSCULOSKELETAL MEDICINE & OMM

## 2025-01-28 PROCEDURE — 1159F MED LIST DOCD IN RCRD: CPT | Mod: CPTII,S$GLB,, | Performed by: NEUROMUSCULOSKELETAL MEDICINE & OMM

## 2025-01-28 PROCEDURE — 3078F DIAST BP <80 MM HG: CPT | Mod: CPTII,S$GLB,, | Performed by: NEUROMUSCULOSKELETAL MEDICINE & OMM

## 2025-01-28 RX ORDER — MELOXICAM 15 MG/1
15 TABLET ORAL DAILY
Qty: 30 TABLET | Refills: 0 | Status: SHIPPED | OUTPATIENT
Start: 2025-01-28 | End: 2025-02-27

## 2025-01-28 NOTE — PROGRESS NOTES
Subjective:     Josue Campos     Chief Complaint   Patient presents with    Right Hip - Pain       HPI    Josue is a 24 y.o. male coming in today for right hip pain that began 1/27/25. 1 day ago, referred by his . Pt. describes the pain as a 8/10 sharp and campy pain that does not radiate. During a basketball game last night, the patient describes getting kneed by an opponent gesturing to his iliac crest, while the opponent was going in for a layup. The pain is better with ice and worse with activity. Pt describes weakness in his leg with activity and describes a feeling of instability. Denies any other musculoskeletal complaints at this time.     Joint instability? Patient describes feeling that his leg is going to give out on him.  Mechanical locking/clicking? No   Affecting ADL's? Yes, getting dressed, getting in and out of bed  Affecting sleep? No     Occupation: Student Athlete, [employer]     Review of Systems   Constitutional:  Negative for chills and fever.   Gastrointestinal:  Positive for abdominal pain and constipation. Negative for diarrhea, nausea and vomiting.   Genitourinary:  Negative for dysuria.   Musculoskeletal:  Positive for myalgias. Negative for back pain, falls, joint pain and neck pain.   Neurological:  Positive for weakness (with R hip flexion). Negative for dizziness, tingling, focal weakness and headaches.       PAST MEDICAL HISTORY: History reviewed. No pertinent past medical history.  PAST SURGICAL HISTORY: History reviewed. No pertinent surgical history.  FAMILY HISTORY: No family history on file.  SOCIAL HISTORY:   Social History     Socioeconomic History    Marital status: Single       MEDICATIONS:   Current Outpatient Medications:     meloxicam (MOBIC) 15 MG tablet, Take 1 tablet (15 mg total) by mouth once daily., Disp: 30 tablet, Rfl: 0    ondansetron (ZOFRAN) 4 MG tablet, Take 1 tablet (4 mg total) by mouth every 8 (eight) hours as needed for Nausea.  "(Patient not taking: Reported on 1/28/2025), Disp: 12 tablet, Rfl: 0  ALLERGIES: Review of patient's allergies indicates:  No Known Allergies    Objective:     VITAL SIGNS: /73   Pulse (!) 48   Ht 6' 5" (1.956 m)   Wt 101.6 kg (223 lb 15.8 oz)   BMI 26.56 kg/m²    General    Nursing note and vitals reviewed.  Constitutional: He is oriented to person, place, and time. He appears well-developed and well-nourished.   HENT:   Head: Normocephalic and atraumatic.   no nasal discharge, no external ear redness or discharge   Eyes:   EOM is full and smooth  No eye redness or discharge   Neck: Neck supple. No tracheal deviation present.   Cardiovascular:  Normal rate.            2+ Radial pulse bilaterally  2+ Dorsalis Pedis pulse bilaterally  No LE edema appreciated   Pulmonary/Chest: Effort normal. No respiratory distress.   Abdominal: Soft. Bowel sounds are normal. He exhibits no distension and no mass. There is abdominal tenderness (Right lower quadrant tenderness over oblique musculature). There is no rebound and no guarding.   No rigidity   Neurological: He is alert and oriented to person, place, and time. He exhibits normal muscle tone. Coordination normal.   See details below   Psychiatric: He has a normal mood and affect. His behavior is normal.               MUSCULOSKELETAL EXAM  HIP: right HIP  The affected hip is compared to the contralateral hip.    Observation:    + Right lower quadrant ecchymosis of the right abdominal oblique musculature  There is no edema, erythema, or ecchymosis in the lumbosacral region.   There is no Trendelenburg sign on either side  No obvious pelvic obliquity while standing.    No thoracolumbar scoliosis observed.    No midline skin abnormalities.  No atrophy noted in the lower limbs.  Gait: Right antalgic with Over pronation ankle mechanics and Pes planus     ROM (* = with pain):  Passive hip flexion to 120° on left and 120° on right  Passive hip internal rotation to 45° on " left and 45° on right  Passive hip external rotation to 45° on left and 45° on right   Passive hip abduction to 45° on left and 45° on right  All motions above are without pain.    Tenderness To Palpation:  +Tenderness on iliac crest  No tenderness at the ASIS, AIIS  No tenderness at the PSIS, PIIS, pubic bones, ischial tuberosity.  No tenderness throughout the lumbar spine, iliolumbar region, or posterior pelvis.  No tenderness throughout the sacrum or piriformis  No tenderness over the greater trochanteric bursa or greater/lesser trochanters.  No tenderness at the glut attachments on the greater trochanter  No tenderness over proximal IT band or hip flexor musculature.    Strength Testing (* = with pain):  Hip flexion - 5/5 on left and 4/5 on right*  Hip extension - 5/5 on left and 4/5 on right*  Hip adduction - 5/5 on left and 5/5 on right*  Hip abduction - 5/5 on left and 5/5 on right  Knee flexion - 5/5 on left and 5/5 on right*  Knee extension - 5/5 on left and 5/5 on right  Glutaeus medius - 5/5 on left and 5/5 on right    Special Tests:  Standing Trendelenburg test - negative    Seated straight leg raise - negative  Supine straight leg raise - negative  Hamstring flexibility symmetric    Log roll - negative  ELIZABETH - negative  FADIR - negative  Scour test - negative  No pain with posterior hip capsule compression    ASIS compression test - negative  SI drawer test - negative   Thigh thrust test - negative     Taz test reveals tight psoas, rectus femoris, and IT band negative   Piriformis test (Bonnet's) - negative  Ely's test - negative  Quadriceps flexibility symmetric.  Bibiana test - negative  Rasheed compression test - negative    Fulcrum test - negative    Neurovascular Exam:  2+ femoral, DP, and PT pulses BL.  No skin changes, no abnormal hair distribution.  Sensation intact to light touch throughout the obturator and medial/lateral/posterior femoral cutaneous nerves.  2+/4 reflexes at L4 and S1  dermatomes  Capillary refill intact to <2 seconds in all lower limb digits.      IMAGIN. X-ray ordered due to right hip pain. (AP pelvis and frogleg lateral  right views) taken today.   2. X-ray images were reviewed personally by me and then directly with patient.  3. FINDINGS: Bony structures of the pelvis and right hip are intact. Right Os acetabuli noted.  No significant bony abnormalities seen. There is little soft tissue calcification seen on the proximal right abdomen region noted.   4. IMPRESSION: As Above      Assessment:      Encounter Diagnoses   Name Primary?    Hip pain, right Yes    Strain of abdominal muscle, initial encounter     Contusion of right hip, initial encounter           Plan:   1. Right Hip pain secondary to iliac crest contusion and Right oblique muscle strain  - Rx given for Meloxicam 15 mg po qday x 14 days then prn for pain control. Pt. Advised to avoid all other NSAIDS while on this medication.  - recommend starting stool softening to help with bowel movements to avoid straining and exacerbation of oblique strain  - recommend rest from basketball activity until pain improved   - plan to start from isometric abdominal exercises in TR for rehab, progressing as tolerated  -  X-ray images of right hip taken today (AP pelvis and frogleg lateral  right views) showed bony structures of the pelvis and right hip are intact. Right Os acetabuli noted.  No significant bony abnormalities seen. There is little soft tissue calcification seen on the proximal right abdomen region noted. . Images were personally reviewed with patient.    2.  Follow-up in 1 week for reevaluation in TR with Dr. Jaxson Gtz.    3. Patient agreeable to today's plan and all questions were answered. Plan discussed with AT, Anna Blanchard, as well.     This note is dictated using the M*Modal Fluency Direct word recognition program. There are word recognition mistakes that are occasionally missed on review.

## 2025-01-29 ENCOUNTER — ATHLETIC TRAINING SESSION (OUTPATIENT)
Dept: SPORTS MEDICINE | Facility: CLINIC | Age: 25
End: 2025-01-29
Payer: COMMERCIAL

## 2025-01-29 DIAGNOSIS — M25.551 PAIN OF RIGHT HIP: Primary | ICD-10-CM

## 2025-01-29 DIAGNOSIS — M25.551 RIGHT HIP PAIN: Primary | ICD-10-CM

## 2025-01-29 NOTE — PROGRESS NOTES
Reason for Encounter New Injury    Subjective:       Chief Complaint: Josue Campos is a 24 y.o. male student at [employer] who had concerns including Injury and Pain of the Right Hip. Josue went up for a rebound and got kneed in the R anterior hip and lower core. Athlete was unable to walk off the court without assistance. At ALBERTO pain is rated 9/10 and is sharp. He states he's unable to activate his leg muscles. Pt denies a pop or crack. Pt denies stomach pain and nausea. HX of a similar injury 2 years ago during a game. Following previous injury he was out for 2 months and was treated with laser therapy.     Handedness: right-handed  Sport played: basketball      Level:            Injury  This is a new problem. The current episode started today. The problem occurs constantly. Pertinent negatives include no abdominal pain. The symptoms are aggravated by standing, walking, coughing and bending.   Pain  Pertinent negatives include no abdominal pain.       Review of Systems   Gastrointestinal:  Negative for abdominal pain.       Objective:       General: oJsue is well-developed, well-nourished, appears stated age, in no acute distress, alert and oriented to time, place and person.         General Musculoskeletal Exam   Gait: abnormal     Right Ankle/Foot Exam     Tests   Heel Walk: unable to perform  Tiptoe Walk: unable to perform  Single Heel Rise: unable to perform  Double Heel Rise: unable to perform        Right Hip Exam     Inspection   Swelling: absent  Bruising: absent  No deformity of hip.    Range of Motion   Abduction:  abnormal   Adduction:  abnormal   Extension:  abnormal   Flexion:  abnormal   External rotation:  abnormal   Internal rotation:  abnormal     Other   Sensation: normal  Left Hip Exam   Left hip exam is normal.      Back (L-Spine & T-Spine) / Neck (C-Spine) Exam     Back (L-Spine & T-Spine) Tests   Right Side Tests  Squat Test: unable to perform      TTP over ASIS, AIIS, and iliac crest.  Tuning for positive for pain. Not TTP over lower core, stomach, or quad.         Assessment:     Status: O - Out    Date Seen:  01/27/2025    Date of Injury:  01/27/2025    Date Out:  01/27/2025    Date Cleared:  n/a        Treatment/Rehab/Maintenance:     Ddx bone bruise      Plan:       1. Athlete did not finish his game and was given ice and an hip ADD spica. Athlete has been instructed to f/u in ATR   2. Physician Referral: yes  3. ED Referral:no  4. Parent/Guardian Notified: No  5. All questions were answered, ath. will contact me for questions or concerns in  the interim.  6.         Eligible to use School Insurance: Yes

## 2025-02-04 ENCOUNTER — ATHLETIC TRAINING SESSION (OUTPATIENT)
Dept: SPORTS MEDICINE | Facility: CLINIC | Age: 25
End: 2025-02-04
Payer: COMMERCIAL

## 2025-02-04 DIAGNOSIS — M25.551 RIGHT HIP PAIN: Primary | ICD-10-CM

## 2025-02-04 NOTE — PROGRESS NOTES
Reason for Encounter Follow-Up    Subjective:       Chief Complaint: Josue Campos is a 24 y.o. male student at [employer] who had concerns including Pain of the Right Hip.    Handedness: right-handed  Sport played: basketball      Level: college            Pain        ROS              Objective:       General: Josue is well-developed, well-nourished, appears stated age, in no acute distress, alert and oriented to time, place and person.     AT Session          Assessment:     Status: L - Limited    Date Seen:  02/04/2025-02/08/2025    Date of Injury:  01/27/2025    Date Out:  01/27/2025    Date Cleared:  n/a        Treatment/Rehab/Maintenance:     Estim premod 20mins   Lymphatic drainage massage 5mins    Subjective:  Date: 02/04/2025  Subjective:Pt stated he is still having a lot of pain but pt started to walk in the pool for 30mins daily. Pt stated he is also seeing Hector Braun PT/AT for treatment.    Patient received the following modalities and/or manual therapy techniques:  Treatment: E-Stim: Premod and Massage  Duration: 20mins  lymphatic massage 3mins    Date: 02/07/2025  Subjective:Pt stated he is doing a little better but still having some pain with specific movements in the pool. Pt stated he will see Hector Braun today for treatment as well  Patient received the following modalities and/or manual therapy techniques:  Treatment: Massage  Duration: lymphatic massage            Plan:       1. Continue treatment in ATF to gradually decrease pain to start exercises in ATF  2. Physician Referral: no  3. ED Referral:no  4. Parent/Guardian Notified: No  5. All questions were answered, ath. will contact me for questions or concerns in  the interim.  6.         Eligible to use School Insurance: Yes

## 2025-02-11 ENCOUNTER — ATHLETIC TRAINING SESSION (OUTPATIENT)
Dept: SPORTS MEDICINE | Facility: CLINIC | Age: 25
End: 2025-02-11
Payer: COMMERCIAL

## 2025-02-11 DIAGNOSIS — M79.662 PAIN IN LEFT LOWER LEG: ICD-10-CM

## 2025-02-11 DIAGNOSIS — M25.551 RIGHT HIP PAIN: Primary | ICD-10-CM

## 2025-02-11 DIAGNOSIS — S39.011D STRAIN OF ABDOMINAL MUSCLE, SUBSEQUENT ENCOUNTER: ICD-10-CM

## 2025-02-11 NOTE — PROGRESS NOTES
"Reason for Encounter Follow-Up    Subjective:       Chief Complaint: Josue Campos is a 24 y.o. male student at [employer] who had concerns including Pain of the Right Hip.    Handedness: right-handed  Sport played: basketball      Level: college            Pain        Review of Systems   All other systems reviewed and are negative.                Objective:       General: Josue is well-developed, well-nourished, appears stated age, in no acute distress, alert and oriented to time, place and person.     AT Session          Assessment:     Status: O - Out    Date Seen:  02/10/2025-02/15/2025    Date of Injury:  01/27/2025    Date Out:  01/27/2025    Date Cleared:  02/15/2025        Treatment/Rehab/Maintenance:     Josue completed therapeutic exercises to develop ROM, flexibility, and core stabilization:    Date 02/11/2025 02/12/2025 02/13/2025 02/14/2025    Exercise Sets/Reps Weight Sets/Reps Weight Sets/Reps Weight Sets/Reps Weight Sets/Reps Weight   Hamstring stretch 3x30"  [x]  [x]     [x]         Adductor stretch 3x30"  [x]     [x]     [x]         Kneeling hip flexor stretch 3x30"  [x]     [x]     [x]         Pall off presses 3x10 Orange  [x]     [x]     [x]         Hip flexor presses 3x10  [x]     [x]       [x]         Standing QL stretch 3x30"  [x]     [x]     [x]         Open books 2x10  [x]     [x]     [x]         Modified Plank  3x30"  [x]     [x]     [x]         Modified side plank 3x30"  [x]     [x]     [x]         Hallow hold 3x15"  [x]     [x]     [x]                                     Subjective:  Date: 2/10/25  Subjective:Pt stated he is doing a lot better and confident he can play in the game on Saturday. massage only completed today. Pt stated he would see Hector leahy today as well.   Patient received the following modalities and/or manual therapy techniques:  Treatment: Massage  Duration: lymphatic massage completed for 5mins     Date: 2/11/25  Subjective: Pt completed exercises today. Lower " body stretches, pall off press, hip flexor presses    Pt stated he has a sharp numbing pain in his left lower leg (anterior tibialis)     Later pt texted stating he now has pain in the left arm.   Pt reffered to emergency room or can see dr. Gtz in clinic following day if pain continues evaluation in seprate encounter.     Patient received the following modalities and/or manual therapy techniques:  Treatment: E-Stim: Compex  Duration: 20mins     Date: 2/12/2025  Subjective:Pt stated his hip and oblique are doing better but still has some pain with specific movements. Today pt started non contact activity. Pt stated he would be seeing PT/AT Hector Leahy today. Pt stated his leg pain was still present during practice but his arm pain has went away. Pt stated if the pain continues he will see Dr. Gtz on clinic for further evaluation.  Patient received the following modalities and/or manual therapy techniques:  Treatment: E-Stim: Compex  Duration: 20mins    Date: 02/13/2025  Subjective: Pt stated he is a little sore today but is doing well. Pt completed rehab today and pt was evaluated by Dr. Gtz prior to the game for his lower leg pain but completed the warm up before the game and stated he felt fine and had no increase pain.   Patient received the following modalities and/or manual therapy techniques:  Treatment: E-Stim: Compex  Duration: 20mins     Date: 02/14/2025  Subjective: Pt completed half court contact practice only. Pt stated he felt good and only had minimal soreness and is confident that he can play in the game for limited minutes Saturday. AT spoke with AT/PT Hector leahy to determine if playing Saturday is a good idea. Hector Leahy stated he has confidence in his ability to play Saturday for 5-7mins. Pt stated he understands the risk of playing without a full practice.  Pt stated he no longer has lower leg pain.   Patient received the following modalities and/or manual therapy  techniques:  Treatment: E-Stim: Compex  Duration: 20mins          Plan:       1. Continue rehab and treatment in ATF   2. Physician Referral: no  3. ED Referral:no  4. Parent/Guardian Notified: No  5. All questions were answered, ath. will contact me for questions or concerns in  the interim.  6.         Eligible to use School Insurance: Yes

## 2025-02-13 ENCOUNTER — ATHLETIC TRAINING SESSION (OUTPATIENT)
Dept: SPORTS MEDICINE | Facility: CLINIC | Age: 25
End: 2025-02-13

## 2025-02-13 DIAGNOSIS — M79.662 PAIN IN LEFT LOWER LEG: Primary | ICD-10-CM

## 2025-02-14 NOTE — PROGRESS NOTES
Reason for Encounter New Injury    Subjective:       Chief Complaint: Josue Campos is a 24 y.o. male student at [employer] who had concerns including Pain and Numbness of the Left Lower Leg.    Handedness: right-handed  Sport played: basketball      Level: college            Pain        Review of Systems   All other systems reviewed and are negative.                Objective:       General: Josue is well-developed, well-nourished, appears stated age, in no acute distress, alert and oriented to time, place and person.             Right Ankle/Foot Exam     Range of Motion   Ankle Joint   Dorsiflexion:  normal   Plantar flexion:  normal   Subtalar Joint   Inversion:  normal   Eversion:  normal   Valdez Test:  negative  First MTP Joint: normal    Muscle Strength   The patient has normal right ankle strength.    Tests   Anterior drawer: negative  Varus tilt: negative  Heel Walk: able to perform  Tiptoe Walk: able to perform  Single Heel Rise: able to perform  Squeeze Test: negative    Other   Ankle Crepitus: absent  Foot Crepitus:  absent  Sensation: normal  Peroneal Subluxation: negative    Left Ankle/Foot Exam     Range of Motion   Ankle Joint  Dorsiflexion:  normal   Plantar flexion:  normal     Subtalar Joint   Inversion:  normal   Eversion:  normal   Valdez Test:  normal  First MTP Joint: normal    Muscle Strength   The patient has normal left ankle strength.    Tests   Anterior drawer: negative  Varus tilt: negative  Heel Walk: able to perform  Tiptoe Walk: able to perform  Single Heel Rise: able to perform  Squeeze Test: absent    Other   Foot Crepitus:  absent  Ankle Crepitus: absent  Sensation: normal  Peroneal Subluxation: negative      Reflexes     Left Side  Left achilles reflex grade: normal compared bilat.  Tinel Sign: absent  Hannah Sign: absent  Paresthesia: absent  Left post tibial reflex grade: normal compared bilat.  Left plantar reflex grade: normal compared bilat.    Right Side   Right achilles  reflex grade: normal.  Tinel Sign: absent  Hannah Sign: absent  Paresthesia: absent  Right post tibial reflex grade: normal.  Right plantar reflex grade: normal.    Vascular Exam     Right Pulses  Right DP grade: normal.  Right PT grade: normal.        Left Pulses  Left DP grade: normal compared bilat.  Left PT grade: normal compared bilat.        Edema  Right Lower Leg: absent  Left Lower Leg: absent            Assessment:     Status: L - Limited     Date Seen:  02/11/2025    Date of Injury:  02/11/2025    Date Out:  n/a    Date Cleared:  n/a        Treatment/Rehab/Maintenance:         [facility]       Name: Josue Campos  Clinic Number: [id number]    Initial Injury Date: 2/11/2025  Initial Injury Evaluation: 2/11/2025    Subjective     Pt stated he has been having a numbing/sharp pain in his lower leg that he has never experienced before. The pain is random and it comes and goes. It is not specific to any movement. Pt stated he cannot recreate numbness/pain. Pt stated it just started 2/11/2025 but does not remember a specific movement causing the pain. He was simply running and felt the pain  Pt stated he is not taking any other medication other that the Mobic prescribed by Dr. Yan Gtz     Pain: 4/10  Location: anterior tibia     Objective     Observation: No swelling ecchymosis or deformity present    Palpation: not TTP    ROM: WNL no pain present  (Noted in AT session above)      Strength: 5/5 no pain present  (Noted in AT session above)    Special Testing:   Stan's sign (-)    Neuro/Vascular: lower neuro exam normal   Dermatomes: normal  Myotomes: normal    Assessment     Nerve entrapment     Differential: MTTS, Compartment syndrome, DVT, pulmonary embolism     Plan     Short Term: IASTM completed for 3mins to help decrease lower leg tightness    Long Term: Refer to Dr. Gtz for further evaluation if numbness or pain increases or continues      Anna MCCRAY, LAT, ATC     [employer] [facility] [place]       Plan:       1. Continue treatment in ATF as needed   2. Physician Referral: yes  3. ED Referral:no  4. Parent/Guardian Notified: No  5. All questions were answered, ath. will contact me for questions or concerns in  the interim.  6.         Eligible to use School Insurance: Yes

## 2025-02-18 ENCOUNTER — ATHLETIC TRAINING SESSION (OUTPATIENT)
Dept: SPORTS MEDICINE | Facility: CLINIC | Age: 25
End: 2025-02-18
Payer: COMMERCIAL

## 2025-02-18 DIAGNOSIS — M25.551 PAIN OF RIGHT HIP: Primary | ICD-10-CM

## 2025-02-18 DIAGNOSIS — M25.522 LEFT ELBOW PAIN: ICD-10-CM

## 2025-02-18 DIAGNOSIS — M54.9 UPPER BACK PAIN: ICD-10-CM

## 2025-02-18 DIAGNOSIS — R10.31 RIGHT GROIN PAIN: ICD-10-CM

## 2025-02-18 DIAGNOSIS — S39.011D STRAIN OF ABDOMINAL MUSCLE, SUBSEQUENT ENCOUNTER: ICD-10-CM

## 2025-02-18 NOTE — PROGRESS NOTES
"Reason for Encounter Follow-Up    Subjective:       Chief Complaint: Josue Campos is a 24 y.o. male student at [employer] who had concerns including Injury and Pain of the Right Hip (Right hip pointer/Right ) and Right Groin Pain .    Handedness: right-handed  Sport played: basketball      Level: college            Injury    Pain        ROS              Objective:       General: Josue is well-developed, well-nourished, appears stated age, in no acute distress, alert and oriented to time, place and person.     AT Session          Assessment:     Status: AT - Cleared to Exert    Date Seen:  02/17/2025-02/22/2025    Date of Injury:  01/27/2025    Date Out:  01/27/2025    Date Cleared:  02/15/2025        Treatment/Rehab/Maintenance:     Josue completed therapeutic exercises to develop strength, endurance, and core stabilization:    Date 02/17/2025 02/18/2025      Exercise Sets/Reps Weight Sets/Reps Weight Sets/Reps Weight Sets/Reps Weight Sets/Reps Weight   Hamstring stretch 3x30"  Cont.          Hip flexor stretch 3x30"  Cont.          Adductor stretch 3x30"  Cont.          Open books 3x10  Cont.          Standing QL stretch 3x30"  Cont.          Modified plank 3x45"  Cont.          Modified side plank 3x45"  Cont.          Hallow leg hold 3x15  Cont.          Palloff press 3x10 orange Cont.            Subjective:  Date: 02/17/2025  Subjective:Pt stated practice is going well but he feels he is over compensating to avoid some pain in his hip causing his R groin to be sore but stated pain is manageable. Core abdominal rehab completed today. Pt stated he is seeing PT/AT Hector Kovacsner tomorrow.     Pt stated he is no longer having any tibia numbness, cramping of pain in his left leg    Patient received the following modalities and/or manual therapy techniques:  Treatment: E-Stim: Compex  Duration: 20mins     Date: 02/18/2025  Subjective:Pt stated he feels about the same as he did yesterday. AT informed pt to continue to " slowly go back into to full practice less reps. Pt completed rehab instead of weights today.  Patient received the following modalities and/or manual therapy techniques:  Treatment: E-Stim: Compex  Duration: 20mins     Date: 02/19/2025  Subjective:Pt was pulled down during a play and landed on his left elbow causing intense pain and tightness in his upper back. Pt was referred to team physician same day for further evaluation.  Estim and hot pack applied to pt upper back to decrease pain and tightness.   Patient received the following modalities and/or manual therapy techniques:  Treatment: E-Stim: Premod and Hot Pack  Duration: 20mins      Plan:       1. Continue rehab and treatment in ATF as needed  2. Physician Referral: no  3. ED Referral:no  4. Parent/Guardian Notified: No  5. All questions were answered, ath. will contact me for questions or concerns in  the interim.  6.         Eligible to use School Insurance: Yes

## 2025-02-19 ENCOUNTER — TELEPHONE (OUTPATIENT)
Dept: SPORTS MEDICINE | Facility: CLINIC | Age: 25
End: 2025-02-19
Payer: COMMERCIAL

## 2025-02-19 DIAGNOSIS — M25.522 LEFT ELBOW PAIN: Primary | ICD-10-CM

## 2025-02-19 DIAGNOSIS — M99.01 SOMATIC DYSFUNCTION OF CERVICAL REGION: ICD-10-CM

## 2025-02-19 DIAGNOSIS — M54.9 UPPER BACK PAIN: ICD-10-CM

## 2025-02-19 DIAGNOSIS — M99.02 SOMATIC DYSFUNCTION OF THORACIC REGION: ICD-10-CM

## 2025-02-19 DIAGNOSIS — M99.07 SOMATIC DYSFUNCTION OF UPPER EXTREMITY: ICD-10-CM

## 2025-02-19 DIAGNOSIS — M99.08 SOMATIC DYSFUNCTION OF RIB CAGE REGION: ICD-10-CM

## 2025-02-19 NOTE — TELEPHONE ENCOUNTER
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 He denies numbness/tingling. He admits to taking two Tylenol since the event occurred. He also admits to landing hard on his left elbow and is tender to touch at the olecranon. He denies any intervention so far and has not seen any bruising or discoloration.    Physical Exam:  MUSCULOSKELETAL    CERVICAL SPINE    INSPECTION  Anterior head carriage.    Normal cervicothoracolumbar curves.    No obvious pelvic obliquity while standing.    No edema, erythema, or ecchymosis noted in cervical spine or shoulder girdle regions.    No atrophy noted in the upper limbs.    PALPATION  No tenderness to palpation throughout the cervical spine  + tenderness over periscapular region on the left.    No bony deformities or step offs palpated along the cervical spinous processes.     ROM  Active flexion to 45°.    Active extension to 50°.    Active rotation to 90° bilaterally.    Active sidebending to 45° bilaterally.    SPECIAL TESTING  Negative Spurlings test.  Negative Lhermittes test.  Negative shoulder abduction relief sign.  Negative upper limb tension tests    NEUROVASCULAR  Full strength with arm abduction, elbow flexion, wrist extension, elbow extension, finger flexion, and finger abduction.    Sensation intact to light touch in the C5-T1 dermatomes bilaterally.   Normal gait without wide base of support or scissoring.  2+ and symmetric radial and ulnar pulses at the wrists bilaterally.   Capillary refill intact at <2 seconds in all upper limb digits.    Elbow: left   The affected elbow is compared to the contralateral elbow.    Observation:    There is no edema, erythema, or ecchymosis.  There is no obvious muscle atrophy,  hypertonicity, or hypotonicity of arm muscles.  There is no abnormal carrying angle or gunstock deformity noted.    ROM:  Active flexion to 150° degrees bilaterally.    Active extension to 0° degrees bilaterally without hyperextension.   Active pronation to 80° degrees bilaterally.    Active supination to 80° degrees bilaterally.    Active radial deviation to 20° degrees bilaterally.    Active ulnar deviation to 30° degrees bilaterally.    Tenderness To Palpation:  No tenderness at the medial epicondyle or lateral epicondyle.  + tenderness at the olecranon.  No tenderness at the distal humerus or proximal radius/ulna.  No tenderness at the radial head.  No tenderness over the ulnar and radial collateral ligaments.  No tenderness over the posterior interosseous nerve or distal biceps tendon.    Strength Testing:  Deltoid - 5/5  Biceps - 5/5  Triceps - 5/5  Wrist extension - 5/5  Wrist flexion - 5/5   - 5/5  Finger extension - 5/5  Finger abduction - 5/5    Special Tests:  No laxity of the MCL at 0 and 30 degrees.    Milking maneuver - negative  No laxity of the LCL at 0 and 30 degrees.  No laxity with posterolateral and posteromedial rotary testing.    Cozens test - negative  3rd digit extension resistance test - negative    Resisted supination - negative  Resisted pronation - negative  Resisted wrist extension - negative  Resisted wrist flexion - negative    Neurovascular Exam:  2+ radial pulses BL.  Sensation to light touch intact in the forearm and hand.  Negative Tinnels at carpal tunnel.  Negative Tinnels at cubital tunnel.      Posture:  Upright and Increased thoracic kyphosis  Gait: Non-antalgic     TART (Tissue texture abnormality, Asymmetry,  Restriction of motion and/or Tenderness) changes:    Head:     Cervical Spine  Thoracic Spine  Lumbar Spine   C1 Neutral T1 NSRRL L1 Neutral   C2 Neutral T2 NSRRL L2 Neutral   C3 Neutral T3 NSRRL L3 Neutral   C4 ERSR T4 Neutral L4 Neutral   C5 Neutral T5 Neutral L5  Neutral   C6 Neutral T6 Neutral     C7 Neutral T7 Neutral       T8 Neutral       T9 Neutral       T10 Neutral       T11 Neutral       T12 Neutral       Ribs:  Superior rib 1 left    Upper Extremity:  Periscapular MFR left    Abdomen:    Pelvis:    Sacrum:    Lower Extremity:      Key   F= Flexed   E = Extended   R = Rotated   N = Neutral   S = Sidebent   TTA = tissue texture abnormality   L/R/B (last letter) = left/right/bilateral   HTP = fascial herniated trigger point   TB = fascial trigger band   CD = fascial continuum distortion         Assessment:  1. Left elbow pain    2. Upper back pain    3. Somatic dysfunction of upper extremity    4. Somatic dysfunction of cervical region    5. Somatic dysfunction of thoracic region    6. Somatic dysfunction of rib cage region          Plan:     Symptoms are intense at this time as the injury was within the last hour, but there are no concerning exam findings now that warrant imaging. I am comfortable with watchful waiting, especially for the neck/upper back. Re: the elbow, if symptoms persist into Thurs-Fri, elbow imaging will be recommended.    Symptoms and exam findings are most consistent with myofascial restriction associated with poor compensation/neuromuscular firing.    Based on her description/body language of pain and somatic dysfunction identified on exam, I discussed osteopathic manipulation as a treatment option today.  He consents to evaluation and treatment.  See below.    OMT 3-4 regions. Oral consent obtained. Reviewed benefits and potential side effects. OMT indicated today due to signs and symptoms as well as local and remote somatic dysfunction findings and their related neurokinetic, lymphatic, fascial and/or arteriovenous body connections. OMT techniques used: Soft Tissue, Myofascial Release, and Muscle Energy. Treatment was tolerated well. Improvement noted in segmental mobility post-treatment in dysfunctional regions. There were no adverse events  and no complications immediately following treatment. Advised plenty of water to help alleviate soreness.    Medications - OTC NSAIDs and/or Tylenol as needed    Exercises - cervicothoracic mobility     Referrals - none at this time    Imaging - will obtain imaging if symptoms worsen or do not respond to OMT    ATR - Sport Participation: Full sport participation    Follow up - as needed ISAAC CAMARILLO             This note was completed in the presence of the physician noted above    This note is dictated using the M*Modal Fluency Direct word recognition program. There are word recognition mistakes that are occasionally missed on review.

## 2025-02-24 DIAGNOSIS — M25.551 HIP PAIN, RIGHT: ICD-10-CM

## 2025-02-24 DIAGNOSIS — S70.01XA CONTUSION OF RIGHT HIP, INITIAL ENCOUNTER: ICD-10-CM

## 2025-02-24 DIAGNOSIS — S39.011A STRAIN OF ABDOMINAL MUSCLE, INITIAL ENCOUNTER: ICD-10-CM

## 2025-02-24 RX ORDER — MELOXICAM 15 MG/1
15 TABLET ORAL DAILY
Qty: 30 TABLET | Refills: 0 | Status: SHIPPED | OUTPATIENT
Start: 2025-02-24 | End: 2025-03-26

## 2025-02-25 ENCOUNTER — ATHLETIC TRAINING SESSION (OUTPATIENT)
Dept: SPORTS MEDICINE | Facility: CLINIC | Age: 25
End: 2025-02-25
Payer: COMMERCIAL

## 2025-02-25 DIAGNOSIS — S39.011A STRAIN OF ABDOMINAL MUSCLE, INITIAL ENCOUNTER: ICD-10-CM

## 2025-02-25 DIAGNOSIS — M25.551 HIP PAIN, RIGHT: Primary | ICD-10-CM

## 2025-02-25 NOTE — PROGRESS NOTES
"Reason for Encounter Follow-Up    Subjective:       Chief Complaint: Josue Campos is a 24 y.o. male student at [employer] who had concerns including Injury and Pain of the Right Hip (Right hip pointer/Right ).    Handedness: right-handed  Sport played: basketball      Level: college            Injury    Pain        Review of Systems   All other systems reviewed and are negative.                Objective:       General: Josue is well-developed, well-nourished, appears stated age, in no acute distress, alert and oriented to time, place and person.     AT Session          Assessment:     Status: AT - Cleared to Exert    Date Seen:  02/24/2025    Date of Injury:  01/27/2025    Date Out:  n/a    Date Cleared:  n/a        Treatment/Rehab/Maintenance:     Josue completed therapeutic exercises to develop flexibility:  Date 02/24/2025       Exercise Sets/Reps Weight Sets/Reps Weight Sets/Reps Weight Sets/Reps Weight Sets/Reps Weight   Hamstring stretch 3x30"            Hip flexor stretch 3x30"            Adductor stretch 3x30"            Open books x            Standing QL stretch 3x30"            Modified plank x            Modified side plank x            Hallow leg hold x            Palloff press x              Subjective:  Date: 2/24/25  Subjective:pt stated he is feel pretty sore today because he ran out of his mobic but is doing fairly well. Stretches and estim compex completed today instead of weight lifting with the team.  Patient received the following modalities and/or manual therapy techniques:  Treatment: E-Stim: Premod  Duration: 20mins     Plan:       1. Continue rehab in [facility] as needed  2. Physician Referral: no  3. ED Referral:no  4. Parent/Guardian Notified: No  5. All questions were answered, ath. will contact me for questions or concerns in  the interim.  6.         Eligible to use School Insurance: Yes                  "

## 2025-05-01 DIAGNOSIS — M25.569 KNEE PAIN: Primary | ICD-10-CM

## 2025-05-01 RX ORDER — MELOXICAM 15 MG/1
15 TABLET ORAL DAILY
Qty: 30 TABLET | Refills: 0 | Status: SHIPPED | OUTPATIENT
Start: 2025-05-01

## 2025-05-05 ENCOUNTER — ATHLETIC TRAINING SESSION (OUTPATIENT)
Dept: SPORTS MEDICINE | Facility: CLINIC | Age: 25
End: 2025-05-05
Payer: COMMERCIAL

## 2025-05-05 DIAGNOSIS — M79.671 RIGHT FOOT PAIN: Primary | ICD-10-CM

## 2025-05-05 NOTE — PROGRESS NOTES
Reason for Encounter N/A    Subjective:       Chief Complaint: Josue Campos is a 24 y.o. male student at [employer] who had concerns including Pain of the Right Foot.    Handedness: right-handed  Sport played: basketball      Level: college            Pain        ROS              Objective:       General: Josue is well-developed, well-nourished, appears stated age, in no acute distress, alert and oriented to time, place and person.     AT Session          Assessment:     Status: F - Full Participation    Date Seen: 05/05/2025    Date of Injury: 05/05/2025    Date Out: n/a    Date Cleared: n/a        Treatment/Rehab/Maintenance:     MAINTENANCE LOG  DATE OF SERVICE: 05/05/2025  INJURY/CONDITON: Foot soreness      MODALITIES:    MISCELLANEOUS:       []Active Release   []Compression Wrap   []Cupping    []Support Wrap  []E-Stim- Compex   []Taping  []E-Stim- IFC    []Foam Roller  []E-Stim- Premod   []Cold Tub  [x]Joint Mobilization   []Contrast Tub  []Manual Therapy   []Hot Pack  [x]Massage    []Hot Tub  []Massage - Scar Tissue  []Ice Cup  []Myofascial Release   []Ice Pack  []Ultrasound  []Ultrasound - Phonophoresis  []Instrumental Assisted Soft Tissue Mobilization (IASTM)  []Intermittent Compression - Normatec  []ROM - Active  []ROM - Passive  []Stretching - Active  []Stretching - Dynamic  []Stretching - Passive  []Stretching - PNF  []Stretching - Static  []Mobility Work - Hip/Back  []Mobility Work - Ankle  []Mobility Work - Shoulder      OTHER/COMMENTS:  Joint mobs(mid foot) for 3mins and manual massage for 3mins completed today to decrease foot soreness  Kt tape applied to arch of foot to decrease.                          Plan:       1. Continue treatment as needed  2. Physician Referral: no  3. ED Referral:no  4. Parent/Guardian Notified: No  5. All questions were answered, ath. will contact me for questions or concerns in  the interim.  6.         Eligible to use School Insurance: Yes

## 2025-07-03 ENCOUNTER — TELEPHONE (OUTPATIENT)
Dept: SPORTS MEDICINE | Facility: CLINIC | Age: 25
End: 2025-07-03
Payer: COMMERCIAL

## 2025-07-03 DIAGNOSIS — Z02.5 SPORTS PHYSICAL: Primary | ICD-10-CM

## 2025-07-04 ENCOUNTER — HOSPITAL ENCOUNTER (OUTPATIENT)
Dept: CARDIOLOGY | Facility: CLINIC | Age: 25
Discharge: HOME | End: 2025-07-04

## 2025-07-04 DIAGNOSIS — Z02.5 SPORTS PHYSICAL: ICD-10-CM

## 2025-07-05 PROCEDURE — 93010 ELECTROCARDIOGRAM REPORT: CPT | Mod: ,,, | Performed by: INTERNAL MEDICINE

## 2025-07-05 PROCEDURE — 93005 ELECTROCARDIOGRAM TRACING: CPT | Mod: ,,, | Performed by: FAMILY MEDICINE

## 2025-07-07 LAB
OHS QRS DURATION: 106 MS
OHS QTC CALCULATION: 418 MS